# Patient Record
Sex: FEMALE | Race: BLACK OR AFRICAN AMERICAN | Employment: OTHER | URBAN - METROPOLITAN AREA
[De-identification: names, ages, dates, MRNs, and addresses within clinical notes are randomized per-mention and may not be internally consistent; named-entity substitution may affect disease eponyms.]

---

## 2017-01-31 ENCOUNTER — LAB ENCOUNTER (OUTPATIENT)
Dept: LAB | Facility: HOSPITAL | Age: 72
End: 2017-01-31
Attending: INTERNAL MEDICINE
Payer: MEDICARE

## 2017-01-31 DIAGNOSIS — E78.00 HYPERCHOLESTEROLEMIA: ICD-10-CM

## 2017-01-31 DIAGNOSIS — I10 ESSENTIAL HYPERTENSION: ICD-10-CM

## 2017-01-31 DIAGNOSIS — E03.9 ACQUIRED HYPOTHYROIDISM: ICD-10-CM

## 2017-01-31 LAB
ALBUMIN SERPL BCP-MCNC: 4.4 G/DL (ref 3.5–4.8)
ALBUMIN/GLOB SERPL: 1.2 {RATIO} (ref 1–2)
ALP SERPL-CCNC: 44 U/L (ref 32–100)
ALT SERPL-CCNC: 12 U/L (ref 14–54)
ANION GAP SERPL CALC-SCNC: 9 MMOL/L (ref 0–18)
AST SERPL-CCNC: 19 U/L (ref 15–41)
BASOPHILS # BLD: 0 K/UL (ref 0–0.2)
BASOPHILS NFR BLD: 1 %
BILIRUB SERPL-MCNC: 1.2 MG/DL (ref 0.3–1.2)
BUN SERPL-MCNC: 9 MG/DL (ref 8–20)
BUN/CREAT SERPL: 11.3 (ref 10–20)
CALCIUM SERPL-MCNC: 9.6 MG/DL (ref 8.5–10.5)
CHLORIDE SERPL-SCNC: 98 MMOL/L (ref 95–110)
CHOLEST SERPL-MCNC: 252 MG/DL (ref 110–200)
CO2 SERPL-SCNC: 31 MMOL/L (ref 22–32)
CREAT SERPL-MCNC: 0.8 MG/DL (ref 0.5–1.5)
EOSINOPHIL # BLD: 0.1 K/UL (ref 0–0.7)
EOSINOPHIL NFR BLD: 1 %
ERYTHROCYTE [DISTWIDTH] IN BLOOD BY AUTOMATED COUNT: 13.6 % (ref 11–15)
GLOBULIN PLAS-MCNC: 3.7 G/DL (ref 2.5–3.7)
GLUCOSE SERPL-MCNC: 91 MG/DL (ref 70–99)
HCT VFR BLD AUTO: 37.1 % (ref 35–48)
HDLC SERPL-MCNC: 94 MG/DL
HGB BLD-MCNC: 12.3 G/DL (ref 12–16)
LDLC SERPL CALC-MCNC: 143 MG/DL (ref 0–99)
LYMPHOCYTES # BLD: 1.9 K/UL (ref 1–4)
LYMPHOCYTES NFR BLD: 39 %
MCH RBC QN AUTO: 32.5 PG (ref 27–32)
MCHC RBC AUTO-ENTMCNC: 33.1 G/DL (ref 32–37)
MCV RBC AUTO: 98 FL (ref 80–100)
MONOCYTES # BLD: 0.3 K/UL (ref 0–1)
MONOCYTES NFR BLD: 6 %
NEUTROPHILS # BLD AUTO: 2.5 K/UL (ref 1.8–7.7)
NEUTROPHILS NFR BLD: 53 %
NONHDLC SERPL-MCNC: 158 MG/DL
OSMOLALITY UR CALC.SUM OF ELEC: 284 MOSM/KG (ref 275–295)
PLATELET # BLD AUTO: 221 K/UL (ref 140–400)
PMV BLD AUTO: 8.5 FL (ref 7.4–10.3)
POTASSIUM SERPL-SCNC: 4.2 MMOL/L (ref 3.3–5.1)
PROT SERPL-MCNC: 8.1 G/DL (ref 5.9–8.4)
RBC # BLD AUTO: 3.78 M/UL (ref 3.7–5.4)
SODIUM SERPL-SCNC: 138 MMOL/L (ref 136–144)
T4 FREE SERPL-MCNC: 0.83 NG/DL (ref 0.58–1.64)
TRIGL SERPL-MCNC: 73 MG/DL (ref 1–149)
TSH SERPL-ACNC: 19.34 UIU/ML (ref 0.34–5.6)
WBC # BLD AUTO: 4.8 K/UL (ref 4–11)

## 2017-01-31 PROCEDURE — 84439 ASSAY OF FREE THYROXINE: CPT

## 2017-01-31 PROCEDURE — 80053 COMPREHEN METABOLIC PANEL: CPT

## 2017-01-31 PROCEDURE — 80061 LIPID PANEL: CPT

## 2017-01-31 PROCEDURE — 85025 COMPLETE CBC W/AUTO DIFF WBC: CPT

## 2017-01-31 PROCEDURE — 84443 ASSAY THYROID STIM HORMONE: CPT

## 2017-01-31 PROCEDURE — 36415 COLL VENOUS BLD VENIPUNCTURE: CPT

## 2017-02-02 ENCOUNTER — TELEPHONE (OUTPATIENT)
Dept: INTERNAL MEDICINE CLINIC | Facility: CLINIC | Age: 72
End: 2017-02-02

## 2017-02-08 ENCOUNTER — OFFICE VISIT (OUTPATIENT)
Dept: INTERNAL MEDICINE CLINIC | Facility: CLINIC | Age: 72
End: 2017-02-08

## 2017-02-08 VITALS
DIASTOLIC BLOOD PRESSURE: 81 MMHG | HEART RATE: 70 BPM | HEIGHT: 66 IN | BODY MASS INDEX: 20.31 KG/M2 | WEIGHT: 126.38 LBS | RESPIRATION RATE: 16 BRPM | SYSTOLIC BLOOD PRESSURE: 137 MMHG

## 2017-02-08 DIAGNOSIS — E03.9 ACQUIRED HYPOTHYROIDISM: ICD-10-CM

## 2017-02-08 DIAGNOSIS — H83.09 LABYRINTHITIS, UNSPECIFIED LATERALITY: Primary | ICD-10-CM

## 2017-02-08 DIAGNOSIS — E78.00 HYPERCHOLESTEROLEMIA: ICD-10-CM

## 2017-02-08 DIAGNOSIS — K21.9 GASTROESOPHAGEAL REFLUX DISEASE, ESOPHAGITIS PRESENCE NOT SPECIFIED: ICD-10-CM

## 2017-02-08 PROCEDURE — G0463 HOSPITAL OUTPT CLINIC VISIT: HCPCS | Performed by: INTERNAL MEDICINE

## 2017-02-08 PROCEDURE — 99214 OFFICE O/P EST MOD 30 MIN: CPT | Performed by: INTERNAL MEDICINE

## 2017-02-08 RX ORDER — LEVOTHYROXINE SODIUM 112 UG/1
112 TABLET ORAL
Qty: 90 TABLET | Refills: 0 | Status: SHIPPED | OUTPATIENT
Start: 2017-02-08 | End: 2017-05-03

## 2017-02-08 RX ORDER — SIMVASTATIN 40 MG
40 TABLET ORAL NIGHTLY
Qty: 90 TABLET | Refills: 1 | Status: SHIPPED | OUTPATIENT
Start: 2017-02-08 | End: 2017-08-10

## 2017-02-08 RX ORDER — PANTOPRAZOLE SODIUM 40 MG/1
40 TABLET, DELAYED RELEASE ORAL
Qty: 90 TABLET | Refills: 1 | Status: SHIPPED | OUTPATIENT
Start: 2017-02-08 | End: 2019-08-01

## 2017-02-08 RX ORDER — HYDROCHLOROTHIAZIDE 12.5 MG/1
12.5 CAPSULE, GELATIN COATED ORAL DAILY
Qty: 90 CAPSULE | Refills: 1 | Status: SHIPPED | OUTPATIENT
Start: 2017-02-08 | End: 2017-08-10

## 2017-02-08 RX ORDER — MECLIZINE HCL 12.5 MG/1
12.5 TABLET ORAL 3 TIMES DAILY PRN
Qty: 30 TABLET | Refills: 0 | Status: SHIPPED | OUTPATIENT
Start: 2017-02-08 | End: 2019-08-01

## 2017-02-08 NOTE — PROGRESS NOTES
HPI:    Patient ID: Ginny Long is a 66year old female. HPI Comments: She has been having vertigo since Sunday. She woke with it. The room is spinning. She saw the eye doctor and it isn't her eyes.   She told me the wrong dose of her thyroid medica Normocephalic and atraumatic.    Right Ear: Tympanic membrane and ear canal normal. No cerumen present  Left Ear: Tympanic membrane and ear canal normal. No cerumen present  Nose: Nose normal.   Mouth/Throat: Oropharynx is clear and moist. No posterior orop simvastatin 40 MG Oral Tab; Take 1 tablet (40 mg total) by mouth nightly. Dispense: 90 tablet;  Refill: 1         XV#[id number]

## 2017-04-13 ENCOUNTER — OFFICE VISIT (OUTPATIENT)
Dept: INTERNAL MEDICINE CLINIC | Facility: CLINIC | Age: 72
End: 2017-04-13

## 2017-04-13 VITALS
HEIGHT: 66 IN | HEART RATE: 72 BPM | BODY MASS INDEX: 19.77 KG/M2 | WEIGHT: 123 LBS | DIASTOLIC BLOOD PRESSURE: 66 MMHG | SYSTOLIC BLOOD PRESSURE: 112 MMHG | TEMPERATURE: 98 F

## 2017-04-13 DIAGNOSIS — R35.0 URINARY FREQUENCY: Primary | ICD-10-CM

## 2017-04-13 PROCEDURE — 81002 URINALYSIS NONAUTO W/O SCOPE: CPT | Performed by: INTERNAL MEDICINE

## 2017-04-13 PROCEDURE — 99213 OFFICE O/P EST LOW 20 MIN: CPT | Performed by: INTERNAL MEDICINE

## 2017-04-13 PROCEDURE — G0463 HOSPITAL OUTPT CLINIC VISIT: HCPCS | Performed by: INTERNAL MEDICINE

## 2017-04-13 RX ORDER — CEPHALEXIN 500 MG/1
500 CAPSULE ORAL 2 TIMES DAILY
Qty: 14 CAPSULE | Refills: 0 | Status: SHIPPED | OUTPATIENT
Start: 2017-04-13 | End: 2017-04-20

## 2017-04-13 NOTE — PROGRESS NOTES
HPI:    Patient ID: Stevan Rueda is a 66year old female. Urinary  This is a new problem. The current episode started 1 to 4 weeks ago. The problem occurs 2 to 4 times per day. The problem has been unchanged.  Associated symptoms include urinary symptom total) by mouth 2 (two) times daily. Dispense: 14 capsule;  Refill: 0         RA#[id number]

## 2017-04-16 ENCOUNTER — TELEPHONE (OUTPATIENT)
Dept: INTERNAL MEDICINE CLINIC | Facility: CLINIC | Age: 72
End: 2017-04-16

## 2017-04-16 DIAGNOSIS — B96.5 PSEUDOMONAS URINARY TRACT INFECTION: Primary | ICD-10-CM

## 2017-04-16 DIAGNOSIS — N39.0 PSEUDOMONAS URINARY TRACT INFECTION: Primary | ICD-10-CM

## 2017-04-16 NOTE — TELEPHONE ENCOUNTER
Please call pt. Does she still have urinary symptoms? She has a low level amount of bacteria in her urine, but they are resistant to oral antibiotics. She should stop the medication I gave her.    If she doesn't have symptoms, no further treatment is [facility]

## 2017-04-17 NOTE — TELEPHONE ENCOUNTER
Pt says symptoms still present but are improved. Pt unable to come to office for a shot, she is leaving out of town today. Pt says she will just come in and repeat urine culture when she returns to town since the symptoms are improving.  Pt verbalized und

## 2017-04-20 ENCOUNTER — APPOINTMENT (OUTPATIENT)
Dept: LAB | Facility: HOSPITAL | Age: 72
End: 2017-04-20
Attending: INTERNAL MEDICINE
Payer: MEDICARE

## 2017-04-20 DIAGNOSIS — N39.0 PSEUDOMONAS URINARY TRACT INFECTION: ICD-10-CM

## 2017-04-20 DIAGNOSIS — B96.5 PSEUDOMONAS URINARY TRACT INFECTION: ICD-10-CM

## 2017-04-20 PROCEDURE — 87086 URINE CULTURE/COLONY COUNT: CPT

## 2017-04-20 PROCEDURE — 87186 SC STD MICRODIL/AGAR DIL: CPT

## 2017-04-23 ENCOUNTER — TELEPHONE (OUTPATIENT)
Dept: INTERNAL MEDICINE CLINIC | Facility: CLINIC | Age: 72
End: 2017-04-23

## 2017-04-23 RX ORDER — CIPROFLOXACIN 250 MG/1
250 TABLET, FILM COATED ORAL 2 TIMES DAILY
Qty: 14 TABLET | Refills: 0 | Status: SHIPPED | OUTPATIENT
Start: 2017-04-23 | End: 2017-04-23

## 2017-04-23 RX ORDER — CIPROFLOXACIN 500 MG/1
500 TABLET, FILM COATED ORAL 2 TIMES DAILY
Qty: 14 TABLET | Refills: 0 | Status: SHIPPED | OUTPATIENT
Start: 2017-04-23 | End: 2017-04-25

## 2017-04-23 NOTE — TELEPHONE ENCOUNTER
Call pt. Your urine culture showed that you still have a mild infection. There is an oral medicine which I can give you for it now.

## 2017-04-25 RX ORDER — CIPROFLOXACIN 500 MG/1
500 TABLET, FILM COATED ORAL 2 TIMES DAILY
Qty: 14 TABLET | Refills: 0 | Status: SHIPPED | OUTPATIENT
Start: 2017-04-25 | End: 2017-05-02

## 2017-04-25 NOTE — TELEPHONE ENCOUNTER
Patient informed with understanding. Medication resent to the new [facility] on [address] as per patient instructed.       Instructed the patient to push fluids, wipe front to back after using the toilet, wear loose clothing, cotton underwear,  Av

## 2017-05-03 DIAGNOSIS — E03.9 ACQUIRED HYPOTHYROIDISM: Primary | ICD-10-CM

## 2017-05-07 RX ORDER — LEVOTHYROXINE SODIUM 112 UG/1
TABLET ORAL
Qty: 30 TABLET | Refills: 0 | Status: SHIPPED | OUTPATIENT
Start: 2017-05-07 | End: 2017-05-31

## 2017-05-23 ENCOUNTER — TELEPHONE (OUTPATIENT)
Dept: FAMILY MEDICINE CLINIC | Facility: CLINIC | Age: 72
End: 2017-05-23

## 2017-05-23 NOTE — TELEPHONE ENCOUNTER
Our office received a fax from [facility] stating that pt was covered for cataract surgery. Pt's PCP does not work at this office. Faxed paperwork to PCP's office. Fahad barbosa.

## 2017-06-03 NOTE — TELEPHONE ENCOUNTER
Hypothyroid Medications  Protocol Criteria:  Appointment scheduled in the past 12 months or the next 3 months  TSH resulted in the past 12 months that is normal  Recent Visits       Provider Department Primary Dx    1 month ago Brian Jimenez MD [place]

## 2017-06-04 RX ORDER — LEVOTHYROXINE SODIUM 112 UG/1
TABLET ORAL
Qty: 15 TABLET | Refills: 0 | Status: SHIPPED | OUTPATIENT
Start: 2017-06-04 | End: 2017-10-06

## 2017-08-10 ENCOUNTER — LAB ENCOUNTER (OUTPATIENT)
Dept: LAB | Facility: HOSPITAL | Age: 72
End: 2017-08-10
Attending: INTERNAL MEDICINE
Payer: MEDICARE

## 2017-08-10 ENCOUNTER — OFFICE VISIT (OUTPATIENT)
Dept: INTERNAL MEDICINE CLINIC | Facility: CLINIC | Age: 72
End: 2017-08-10

## 2017-08-10 VITALS
BODY MASS INDEX: 19.98 KG/M2 | RESPIRATION RATE: 16 BRPM | WEIGHT: 124.31 LBS | HEIGHT: 66 IN | HEART RATE: 65 BPM | DIASTOLIC BLOOD PRESSURE: 69 MMHG | SYSTOLIC BLOOD PRESSURE: 128 MMHG

## 2017-08-10 DIAGNOSIS — Z00.00 MEDICARE ANNUAL WELLNESS VISIT, INITIAL: Primary | ICD-10-CM

## 2017-08-10 DIAGNOSIS — Z13.31 DEPRESSION SCREENING: ICD-10-CM

## 2017-08-10 DIAGNOSIS — E03.9 ACQUIRED HYPOTHYROIDISM: ICD-10-CM

## 2017-08-10 DIAGNOSIS — Z01.419 ENCOUNTER FOR GYNECOLOGICAL EXAMINATION WITHOUT ABNORMAL FINDING: ICD-10-CM

## 2017-08-10 DIAGNOSIS — Z12.31 VISIT FOR SCREENING MAMMOGRAM: ICD-10-CM

## 2017-08-10 DIAGNOSIS — Z23 NEED FOR VACCINATION: ICD-10-CM

## 2017-08-10 DIAGNOSIS — I10 ESSENTIAL HYPERTENSION: ICD-10-CM

## 2017-08-10 DIAGNOSIS — E78.00 HYPERCHOLESTEROLEMIA: ICD-10-CM

## 2017-08-10 DIAGNOSIS — M47.812 CERVICAL ARTHRITIS: ICD-10-CM

## 2017-08-10 LAB
ANION GAP SERPL CALC-SCNC: 8 MMOL/L (ref 0–18)
BUN SERPL-MCNC: 9 MG/DL (ref 8–20)
BUN/CREAT SERPL: 9.4 (ref 10–20)
CALCIUM SERPL-MCNC: 9.6 MG/DL (ref 8.5–10.5)
CHLORIDE SERPL-SCNC: 98 MMOL/L (ref 95–110)
CO2 SERPL-SCNC: 32 MMOL/L (ref 22–32)
CREAT SERPL-MCNC: 0.96 MG/DL (ref 0.5–1.5)
GLUCOSE SERPL-MCNC: 84 MG/DL (ref 70–99)
OSMOLALITY UR CALC.SUM OF ELEC: 284 MOSM/KG (ref 275–295)
POTASSIUM SERPL-SCNC: 3.6 MMOL/L (ref 3.3–5.1)
SODIUM SERPL-SCNC: 138 MMOL/L (ref 136–144)
T4 FREE SERPL-MCNC: 0.56 NG/DL (ref 0.58–1.64)
TSH SERPL-ACNC: 52.92 UIU/ML (ref 0.45–5.33)

## 2017-08-10 PROCEDURE — G0101 CA SCREEN;PELVIC/BREAST EXAM: HCPCS | Performed by: INTERNAL MEDICINE

## 2017-08-10 PROCEDURE — 84439 ASSAY OF FREE THYROXINE: CPT

## 2017-08-10 PROCEDURE — G0009 ADMIN PNEUMOCOCCAL VACCINE: HCPCS | Performed by: INTERNAL MEDICINE

## 2017-08-10 PROCEDURE — 84443 ASSAY THYROID STIM HORMONE: CPT

## 2017-08-10 PROCEDURE — 90670 PCV13 VACCINE IM: CPT | Performed by: INTERNAL MEDICINE

## 2017-08-10 PROCEDURE — 36415 COLL VENOUS BLD VENIPUNCTURE: CPT

## 2017-08-10 PROCEDURE — 96160 PT-FOCUSED HLTH RISK ASSMT: CPT | Performed by: INTERNAL MEDICINE

## 2017-08-10 PROCEDURE — 80048 BASIC METABOLIC PNL TOTAL CA: CPT

## 2017-08-10 RX ORDER — SIMVASTATIN 40 MG
40 TABLET ORAL NIGHTLY
Qty: 90 TABLET | Refills: 1 | Status: SHIPPED | OUTPATIENT
Start: 2017-08-10 | End: 2017-12-20

## 2017-08-10 RX ORDER — HYDROCHLOROTHIAZIDE 12.5 MG/1
12.5 CAPSULE, GELATIN COATED ORAL DAILY
Qty: 90 CAPSULE | Refills: 1 | Status: SHIPPED | OUTPATIENT
Start: 2017-08-10 | End: 2017-12-20

## 2017-08-10 RX ORDER — ASPIRIN 81 MG/1
81 TABLET ORAL DAILY
Qty: 30 TABLET | Refills: 0 | Status: SHIPPED | OUTPATIENT
Start: 2017-08-10

## 2017-08-10 NOTE — PATIENT INSTRUCTIONS
Recommended Websites for Advanced Directives    [employer]no. org/publications/[email or web address]/personal_[email or web address]. pdf  An information packet, including necessary form from the [address] website. http://www. [place]state. [place]/public/books/adv

## 2017-08-10 NOTE — PROGRESS NOTES
HPI:    Patient ID: Don Rene is a 66year old female. HPI:  Don Rene is a 66year old female who presents for a Medicare initial exam.    She is doing well. She has a 15year old foster son. She is active.     Annual Physical due on 08/10/201 INFORMATION:  She  has a past medical history of Essential hypertension; Hyperlipidemia; and Hyperthyroidism.    She  has a past surgical history that includes back surgery; hysterectomy; other surgical history; other surgical history; and cataract (Left, 3 inappropriate responses:  No I avoid social activities because I cannot hear well and fear I will reply improperly:  No Family members and friends have told me they think I may have hearing loss:  No       Visual Acuity  Right Eye Visual Acuity: Asuncion Shrestha No    Problems with daily activities? : No    Memory Problems?: No     Fall/Risk Assessment   Do you have 3 or more medical conditions?: 0-No    Have you fallen in the last 12 months?: 0-No    Do you accidently lose urine?: 1-Yes    Do you have difficulty Preventative Physical Exam only, or if medically necessary Electrocardiogram date   Colorectal Cancer Screening   Colonoscopy Screen every 10 years Colonoscopy,10 Years due on 06/21/2026 Update Health Maintenance if applicable  Flex Sigmoidoscopy Screen ev Value                01/31/2017               0.80             ---------- No flowsheet data found.   BUN  Annually BUN (mg/dL)      Date                     Value                01/31/2017               9                ---------- No flows 12.5 MG Oral Cap Take 1 capsule (12.5 mg total) by mouth daily.  Disp: 90 capsule Rfl: 1   Pantoprazole Sodium 40 MG Oral Tab EC Take 1 tablet (40 mg total) by mouth every morning before breakfast. Disp: 90 tablet Rfl: 1   Meclizine HCl 12.5 MG Oral Tab Tc Samaniego exam.  Pt is up-to-date on colonoscopy   Advised to get flu vaccine every year  Recommended pt take aspirin 81 mg daily. .  Diet assessment:  Good. Advanced Directive:  POA:  Pt does not have POA in [address] [place] [address] Rd. Discussed with pt and provided information.

## 2017-08-12 ENCOUNTER — TELEPHONE (OUTPATIENT)
Dept: INTERNAL MEDICINE CLINIC | Facility: CLINIC | Age: 72
End: 2017-08-12

## 2017-08-12 RX ORDER — LEVOTHYROXINE SODIUM 112 UG/1
TABLET ORAL
Qty: 90 TABLET | Refills: 1 | Status: CANCELLED | OUTPATIENT
Start: 2017-08-12

## 2017-08-12 NOTE — TELEPHONE ENCOUNTER
Call pt. Her thyroid level is very low. Did she run out of her thyroid medication? If so, when? I will send a new prescription to the pharmacy. Which local pharmacy does she want to use? I need to see her back in 3 months.   Please schedule an appoint

## 2017-08-14 NOTE — TELEPHONE ENCOUNTER
Patient returning call - informed patient of Dr Stiven Briceno note. Pt states she has not run out of meds, it's just that \"sometimes I don't take them\" Pt states she has been taking them for a week, but before that had been off of them for a week.  Pt states

## 2017-08-14 NOTE — TELEPHONE ENCOUNTER
Please gently tell pt that if she doesn't take the thyroid medication it could slow her thinking, slow her reflexes, and affect her ability to drive. It is a serious problem.   It is an important medication that affects all systems of the body including he

## 2017-08-17 NOTE — TELEPHONE ENCOUNTER
[facility] transfer to [phone]. Dr. Mainor Miller the patient is not calling us back. Would you like a certified letter sent with the information below?

## 2017-08-17 NOTE — TELEPHONE ENCOUNTER
Pt called back and was informed of Dr Beti Haas message below. Pt states has been taking the levothyroxine for ~2 weeks now and plans to continue taking it daily as prescribed.     RAMEZ Larkin

## 2017-08-30 ENCOUNTER — NURSE TRIAGE (OUTPATIENT)
Dept: OTHER | Age: 72
End: 2017-08-30

## 2017-08-30 NOTE — TELEPHONE ENCOUNTER
Action Requested: Summary for Provider     []  Critical Lab, Recommendations Needed  [] Need Additional Advice  []   FYI    []   Need Orders  [] Need Medications Sent to Pharmacy  []  Other     SUMMARY: Spoke to pt, had informed  at [facility] of ear pain.  Per

## 2017-08-31 ENCOUNTER — OFFICE VISIT (OUTPATIENT)
Dept: INTERNAL MEDICINE CLINIC | Facility: CLINIC | Age: 72
End: 2017-08-31

## 2017-08-31 VITALS
HEIGHT: 66 IN | WEIGHT: 123 LBS | TEMPERATURE: 98 F | DIASTOLIC BLOOD PRESSURE: 67 MMHG | BODY MASS INDEX: 19.77 KG/M2 | SYSTOLIC BLOOD PRESSURE: 115 MMHG | HEART RATE: 75 BPM

## 2017-08-31 DIAGNOSIS — I10 ESSENTIAL HYPERTENSION: ICD-10-CM

## 2017-08-31 DIAGNOSIS — E03.9 ACQUIRED HYPOTHYROIDISM: ICD-10-CM

## 2017-08-31 DIAGNOSIS — S16.1XXA STRAIN OF NECK MUSCLE, INITIAL ENCOUNTER: Primary | ICD-10-CM

## 2017-08-31 PROCEDURE — G0463 HOSPITAL OUTPT CLINIC VISIT: HCPCS | Performed by: INTERNAL MEDICINE

## 2017-08-31 PROCEDURE — 99214 OFFICE O/P EST MOD 30 MIN: CPT | Performed by: INTERNAL MEDICINE

## 2017-08-31 NOTE — ASSESSMENT & PLAN NOTE
Pt did not realize the importance of taking her thyroid medication. I emphasized that it was critical for her health. She has resumed taking it daily. Recheck labs in 2 months and f/u afterwards.

## 2017-08-31 NOTE — PROGRESS NOTES
HPI:    Patient ID: Alexis Louie is a 66year old female. She has been having ear pain. It it worse at night. She had stopped taking her thyroid medication and her TSH was very high. Ear Pain    There is pain in the left ear.  This is a new p 0.00      Smokeless tobacco: Former User                     Alcohol use:  Yes              Comment: ocassionally    Family History   Problem Relation Age of Onset   • Cancer Father    • Heart Disorder Mother      Stroke   • Cancer Brother    • Diabetes Neg encounter

## 2017-08-31 NOTE — ASSESSMENT & PLAN NOTE
Pt's ear pain is reproduced by rotation of the neck. Pain is likely due to neck strain. Advised tylenol before bed and neck exercises. Gave written exercises.

## 2017-09-11 ENCOUNTER — NURSE TRIAGE (OUTPATIENT)
Dept: OTHER | Age: 72
End: 2017-09-11

## 2017-09-11 DIAGNOSIS — H92.02 LEFT EAR PAIN: Primary | ICD-10-CM

## 2017-09-11 NOTE — TELEPHONE ENCOUNTER
Pt had an OV on 08/31 for this same ear pain. She states that she has been taking Tylenol #3 (left over from dental procedure) but this has not helped with her pain. Her left ear and into neck is throbbing. Pain has not diminished since LOV.   Pt request

## 2017-09-15 ENCOUNTER — APPOINTMENT (OUTPATIENT)
Dept: LAB | Facility: HOSPITAL | Age: 72
End: 2017-09-15
Attending: INTERNAL MEDICINE
Payer: MEDICARE

## 2017-09-15 ENCOUNTER — NURSE TRIAGE (OUTPATIENT)
Dept: OTHER | Age: 72
End: 2017-09-15

## 2017-09-15 ENCOUNTER — OFFICE VISIT (OUTPATIENT)
Dept: INTERNAL MEDICINE CLINIC | Facility: CLINIC | Age: 72
End: 2017-09-15

## 2017-09-15 ENCOUNTER — OFFICE VISIT (OUTPATIENT)
Dept: OTOLARYNGOLOGY | Facility: CLINIC | Age: 72
End: 2017-09-15

## 2017-09-15 VITALS
BODY MASS INDEX: 21 KG/M2 | HEIGHT: 64 IN | DIASTOLIC BLOOD PRESSURE: 66 MMHG | WEIGHT: 123 LBS | SYSTOLIC BLOOD PRESSURE: 104 MMHG

## 2017-09-15 VITALS
WEIGHT: 124 LBS | HEART RATE: 73 BPM | SYSTOLIC BLOOD PRESSURE: 135 MMHG | HEIGHT: 66 IN | BODY MASS INDEX: 19.93 KG/M2 | DIASTOLIC BLOOD PRESSURE: 78 MMHG

## 2017-09-15 DIAGNOSIS — H92.02 LEFT EAR PAIN: Primary | ICD-10-CM

## 2017-09-15 DIAGNOSIS — R30.0 DYSURIA: Primary | ICD-10-CM

## 2017-09-15 LAB
BILIRUB UR QL: NEGATIVE
COLOR UR: YELLOW
GLUCOSE UR-MCNC: NEGATIVE MG/DL
KETONES UR-MCNC: NEGATIVE MG/DL
NITRITE UR QL STRIP.AUTO: NEGATIVE
PH UR: 7 [PH] (ref 5–8)
PROT UR-MCNC: 100 MG/DL
RBC #/AREA URNS AUTO: 238 /HPF
SP GR UR STRIP: 1.01 (ref 1–1.03)
UROBILINOGEN UR STRIP-ACNC: <2
VIT C UR-MCNC: NEGATIVE MG/DL
WBC #/AREA URNS AUTO: 519 /HPF

## 2017-09-15 PROCEDURE — G0463 HOSPITAL OUTPT CLINIC VISIT: HCPCS | Performed by: INTERNAL MEDICINE

## 2017-09-15 PROCEDURE — 99203 OFFICE O/P NEW LOW 30 MIN: CPT | Performed by: OTOLARYNGOLOGY

## 2017-09-15 PROCEDURE — 87077 CULTURE AEROBIC IDENTIFY: CPT | Performed by: INTERNAL MEDICINE

## 2017-09-15 PROCEDURE — 87086 URINE CULTURE/COLONY COUNT: CPT | Performed by: INTERNAL MEDICINE

## 2017-09-15 PROCEDURE — 87186 SC STD MICRODIL/AGAR DIL: CPT | Performed by: INTERNAL MEDICINE

## 2017-09-15 PROCEDURE — G0463 HOSPITAL OUTPT CLINIC VISIT: HCPCS | Performed by: OTOLARYNGOLOGY

## 2017-09-15 PROCEDURE — 99213 OFFICE O/P EST LOW 20 MIN: CPT | Performed by: INTERNAL MEDICINE

## 2017-09-15 PROCEDURE — 81001 URINALYSIS AUTO W/SCOPE: CPT | Performed by: INTERNAL MEDICINE

## 2017-09-15 RX ORDER — ASCORBIC ACID 500 MG
500 TABLET ORAL DAILY
COMMUNITY

## 2017-09-15 RX ORDER — NITROFURANTOIN 25; 75 MG/1; MG/1
100 CAPSULE ORAL 2 TIMES DAILY WITH MEALS
Qty: 10 CAPSULE | Refills: 0 | Status: SHIPPED | OUTPATIENT
Start: 2017-09-15 | End: 2017-09-20

## 2017-09-15 RX ORDER — OMEGA-3S/DHA/EPA/FISH OIL/D3 300MG-1000
CAPSULE ORAL
COMMUNITY

## 2017-09-15 NOTE — PATIENT INSTRUCTIONS
Await results of urine testing. Please take Macrobid 100 mg twice daily with meals for 5 days. Call if no better.

## 2017-09-15 NOTE — PROGRESS NOTES
Annmarie Sierra is a 66year old female. Patient presents with:  Burning On Urination: Pt stts she also saw blood in the urine as well    HPI:   She has had 2 episodes this morning of burning with urination and blood in her urine.   She had no symptoms yester date: OTHER SURGICAL HISTORY      Comment: Thyroid nodule removed   Social History:  Smoking status: Former Smoker                                                              Packs/day: 0.00      Years: 0.00      Smokeless tobacco: Former User

## 2017-09-15 NOTE — TELEPHONE ENCOUNTER
Action Requested: Summary for Provider     []  Critical Lab, Recommendations Needed  [] Need Additional Advice  []   FYI    []   Need Orders  [] Need Medications Sent to Pharmacy  []  Other     SUMMARY: Advised appt per protocol, and pt agrees.  Scheduled t

## 2017-09-16 NOTE — PROGRESS NOTES
Marnie Crystal is a 66year old female. Patient presents with:  Ear Pain: left ear x one month, getting worse     HPI:   She has been experieincing pain in and around the left ear for the last few weeks. No drainage or change in hearing.  Has had some pain i rashes  RESPIRATORY: denies shortness of breath with exertion  NEURO: denies headaches    EXAM:   /66 (BP Location: Left arm, Patient Position: Sitting, Cuff Size: adult)   Ht 5' 4\" (1.626 m)   Wt 123 lb (55.8 kg)   BMI 21.11 kg/m²   System Findings

## 2017-10-06 ENCOUNTER — TELEPHONE (OUTPATIENT)
Dept: INTERNAL MEDICINE CLINIC | Facility: CLINIC | Age: 72
End: 2017-10-06

## 2017-10-06 ENCOUNTER — LAB ENCOUNTER (OUTPATIENT)
Dept: LAB | Facility: HOSPITAL | Age: 72
End: 2017-10-06
Attending: INTERNAL MEDICINE
Payer: MEDICARE

## 2017-10-06 DIAGNOSIS — E03.9 ACQUIRED HYPOTHYROIDISM: Primary | ICD-10-CM

## 2017-10-06 DIAGNOSIS — E03.9 ACQUIRED HYPOTHYROIDISM: ICD-10-CM

## 2017-10-06 PROCEDURE — 84443 ASSAY THYROID STIM HORMONE: CPT

## 2017-10-06 PROCEDURE — 84439 ASSAY OF FREE THYROXINE: CPT

## 2017-10-06 RX ORDER — LEVOTHYROXINE SODIUM 137 UG/1
137 TABLET ORAL
Qty: 90 TABLET | Refills: 1 | Status: SHIPPED | OUTPATIENT
Start: 2017-10-06 | End: 2017-12-25

## 2017-10-07 NOTE — TELEPHONE ENCOUNTER
Call pt: Your tests indicate that your thyroid dose is still too low. This can make you feel tired and affect all the organs in your body. I need to increase it slightly. I will send a new script to the pharmacy.   Then I would like to recheck a non-fas

## 2017-10-11 NOTE — TELEPHONE ENCOUNTER
LMTCB transfer to triage= see message below    Call pt: Your tests indicate that your thyroid dose is still too low. This can make you feel tired and affect all the organs in your body. I need to increase it slightly.   I will send a new script to the

## 2017-10-11 NOTE — TELEPHONE ENCOUNTER
Spoke with patient (verified name and ), reviewed information, patient verbalized understanding and agrees with plan. Instructions for taking levothyroxine discussed with pt, she denies questions.

## 2017-10-24 ENCOUNTER — TELEPHONE (OUTPATIENT)
Dept: INTERNAL MEDICINE CLINIC | Facility: CLINIC | Age: 72
End: 2017-10-24

## 2017-10-24 DIAGNOSIS — H57.10 EYE PAIN, UNSPECIFIED LATERALITY: Primary | ICD-10-CM

## 2017-10-24 NOTE — TELEPHONE ENCOUNTER
Teetee Woody from [facility] called requesting a referral for this patient to be seen today in their office for eye pain ,

## 2017-10-25 NOTE — TELEPHONE ENCOUNTER
Sorry.  There was a question about which Dr. Justin Barron. And then I forgot to sign. I signed it now. Please fax.

## 2017-10-25 NOTE — TELEPHONE ENCOUNTER
No referral has been recorded in EPIC. Referral order prepped for your signature. Please sign and I will fax to the office. Thank you.

## 2017-11-01 ENCOUNTER — MED REC SCAN ONLY (OUTPATIENT)
Dept: INTERNAL MEDICINE CLINIC | Facility: CLINIC | Age: 72
End: 2017-11-01

## 2017-11-02 ENCOUNTER — OFFICE VISIT (OUTPATIENT)
Dept: INTERNAL MEDICINE CLINIC | Facility: CLINIC | Age: 72
End: 2017-11-02

## 2017-11-02 VITALS
BODY MASS INDEX: 20.3 KG/M2 | DIASTOLIC BLOOD PRESSURE: 67 MMHG | WEIGHT: 126.31 LBS | SYSTOLIC BLOOD PRESSURE: 117 MMHG | RESPIRATION RATE: 18 BRPM | HEIGHT: 66 IN | HEART RATE: 71 BPM

## 2017-11-02 DIAGNOSIS — E03.9 ACQUIRED HYPOTHYROIDISM: ICD-10-CM

## 2017-11-02 DIAGNOSIS — I10 ESSENTIAL HYPERTENSION: ICD-10-CM

## 2017-11-02 DIAGNOSIS — M54.2 NECK PAIN: Primary | ICD-10-CM

## 2017-11-02 PROCEDURE — 99214 OFFICE O/P EST MOD 30 MIN: CPT | Performed by: INTERNAL MEDICINE

## 2017-11-02 PROCEDURE — G0463 HOSPITAL OUTPT CLINIC VISIT: HCPCS | Performed by: INTERNAL MEDICINE

## 2017-11-02 NOTE — ASSESSMENT & PLAN NOTE
Neck muscle strain. Will refer for PT. She would like to go for PT closer to her home. Gave information for [facility].

## 2017-11-02 NOTE — PROGRESS NOTES
HPI:    Patient ID: Prince Baumann is a 66year old female. Pt is still having the left ear pain, especially at night. She saw Dr. Leocadia Mcburney who said it is TMJ and musculoskeletal pain.   He told her to take Aleve and tylenol      Ear Pain    There is pain i Pantoprazole Sodium 40 MG Oral Tab EC Take 1 tablet (40 mg total) by mouth every morning before breakfast. Disp: 90 tablet Rfl: 1   Meclizine HCl 12.5 MG Oral Tab Take 1 tablet (12.5 mg total) by mouth 3 (three) times daily as needed for Dizziness.  CAUSE Relevant Orders    PHYSICAL THERAPY - INTERNAL    Essential hypertension     Controlled. Continue present management. Acquired hypothyroidism     Urged pt to take the levothyroxine every day. Recheck labs in December.            Other Visit Kristopher Walker

## 2017-11-04 ENCOUNTER — HOSPITAL ENCOUNTER (OUTPATIENT)
Dept: MAMMOGRAPHY | Facility: HOSPITAL | Age: 72
Discharge: HOME | End: 2017-11-04
Attending: INTERNAL MEDICINE
Payer: MEDICARE

## 2017-11-04 DIAGNOSIS — Z12.31 VISIT FOR SCREENING MAMMOGRAM: ICD-10-CM

## 2017-11-04 PROCEDURE — 77067 SCR MAMMO BI INCL CAD: CPT | Performed by: INTERNAL MEDICINE

## 2017-12-19 ENCOUNTER — OFFICE VISIT (OUTPATIENT)
Dept: INTERNAL MEDICINE CLINIC | Facility: CLINIC | Age: 72
End: 2017-12-19

## 2017-12-19 ENCOUNTER — HOSPITAL ENCOUNTER (OUTPATIENT)
Dept: GENERAL RADIOLOGY | Facility: HOSPITAL | Age: 72
Discharge: HOME | End: 2017-12-19
Attending: PHYSICIAN ASSISTANT
Payer: MEDICARE

## 2017-12-19 ENCOUNTER — LAB ENCOUNTER (OUTPATIENT)
Dept: LAB | Facility: HOSPITAL | Age: 72
End: 2017-12-19
Attending: INTERNAL MEDICINE
Payer: MEDICARE

## 2017-12-19 VITALS
SYSTOLIC BLOOD PRESSURE: 106 MMHG | BODY MASS INDEX: 20 KG/M2 | DIASTOLIC BLOOD PRESSURE: 68 MMHG | WEIGHT: 125.5 LBS | TEMPERATURE: 98 F | HEART RATE: 76 BPM

## 2017-12-19 DIAGNOSIS — E03.9 ACQUIRED HYPOTHYROIDISM: ICD-10-CM

## 2017-12-19 DIAGNOSIS — M25.511 ACUTE PAIN OF RIGHT SHOULDER: ICD-10-CM

## 2017-12-19 DIAGNOSIS — M25.511 ACUTE PAIN OF RIGHT SHOULDER: Primary | ICD-10-CM

## 2017-12-19 PROCEDURE — 36415 COLL VENOUS BLD VENIPUNCTURE: CPT

## 2017-12-19 PROCEDURE — 84439 ASSAY OF FREE THYROXINE: CPT

## 2017-12-19 PROCEDURE — 84443 ASSAY THYROID STIM HORMONE: CPT

## 2017-12-19 PROCEDURE — 99213 OFFICE O/P EST LOW 20 MIN: CPT | Performed by: PHYSICIAN ASSISTANT

## 2017-12-19 PROCEDURE — 73030 X-RAY EXAM OF SHOULDER: CPT | Performed by: PHYSICIAN ASSISTANT

## 2017-12-19 NOTE — PATIENT INSTRUCTIONS
Ibuprofen/Advil - take 1-2 tablets every 8 hours with food for the next 3 days, then you can take it only as needed  Continue to apply heat to the right shoulder for 10-20 minutes 2-3 times a day  Will check shoulder xray and contact you with the results

## 2017-12-19 NOTE — PROGRESS NOTES
Bryan Goodman is a 67year old female. Patient presents with:  Shoulder Pain: left shoulder  Tingling: left side of face      HPI:   Patient presents today complaining of right shoulder pain and tingling in the left cheek.  Shoulder pain began one week ago by mouth daily.  Disp: 90 capsule Rfl: 1   Pantoprazole Sodium 40 MG Oral Tab EC Take 1 tablet (40 mg total) by mouth every morning before breakfast. Disp: 90 tablet Rfl: 1   Meclizine HCl 12.5 MG Oral Tab Take 1 tablet (12.5 mg total) by mouth 3 (three) ti or gallops. No edema. EXTREMITIES: TTP of right anterior shoulder. Decreased active and passive range of motion due to pain. Pain with overhead reaching, internal and external rotation. BACK: Normal curvature. No midline or paraspinal tenderness.   Janie Carrizales

## 2017-12-20 DIAGNOSIS — E78.00 HYPERCHOLESTEROLEMIA: ICD-10-CM

## 2017-12-20 RX ORDER — HYDROCHLOROTHIAZIDE 12.5 MG/1
CAPSULE, GELATIN COATED ORAL
Qty: 90 CAPSULE | Refills: 0 | Status: SHIPPED | OUTPATIENT
Start: 2017-12-20 | End: 2018-02-22

## 2017-12-20 RX ORDER — SIMVASTATIN 40 MG
TABLET ORAL
Qty: 90 TABLET | Refills: 0 | Status: SHIPPED | OUTPATIENT
Start: 2017-12-20 | End: 2018-02-22

## 2017-12-20 NOTE — TELEPHONE ENCOUNTER
Signed Prescriptions Disp Refills    SIMVASTATIN 40 MG Oral Tab 90 tablet 0      Sig: TAKE 1 TABLET EVERY NIGHT        Authorizing Provider: Diego Staton        Ordering User: Moisés Morales      HYDROCHLOROTHIAZIDE 12.5 MG Oral Cap 90 capsule 0 CL 98 08/10/2017   CO2 32 08/10/2017   GLOBULIN 3.7 01/31/2017   ANIONGAP 8 08/10/2017   OSMOCALC 284 08/10/2017     Cholesterol Medications  Protocol Criteria:  · Appointment scheduled in the past 12 months or in the next 3 months  · ALT & LDL on file in

## 2017-12-25 ENCOUNTER — TELEPHONE (OUTPATIENT)
Dept: INTERNAL MEDICINE CLINIC | Facility: CLINIC | Age: 72
End: 2017-12-25

## 2017-12-25 DIAGNOSIS — E03.9 ACQUIRED HYPOTHYROIDISM: Primary | ICD-10-CM

## 2017-12-25 RX ORDER — LEVOTHYROXINE SODIUM 0.15 MG/1
150 TABLET ORAL
Qty: 90 TABLET | Refills: 0 | Status: SHIPPED | OUTPATIENT
Start: 2017-12-25 | End: 2018-02-06

## 2017-12-26 NOTE — TELEPHONE ENCOUNTER
Please call pt. Your thyroid dose is still a little bit too low. This can make you feel tired and weak. I need to increase it slightly. I will send a new script to the pharmacy. Then I would like to recheck a non-fasting blood test in 3 months.     It is

## 2017-12-27 NOTE — TELEPHONE ENCOUNTER
Advised patient on Dr. Jeniffer James information and recommendations on her thyroid prescription. Patient verbalized understanding. Appointment scheduled with Dr. Mainor Miller for 3/20/18 9:10 am at the [facility].  Advised patient to call back if questions or issues; she

## 2018-01-25 ENCOUNTER — TELEPHONE (OUTPATIENT)
Dept: OTHER | Age: 73
End: 2018-01-25

## 2018-01-25 NOTE — TELEPHONE ENCOUNTER
Pt states she was advised to see ENT last year for left ear pain, she saw the ENT but he did nothing for her symptoms. States she is getting more pain now and is now going down to her neck on the left side. No fever.  Pt asking to schedule appt sooner than

## 2018-01-30 ENCOUNTER — LAB ENCOUNTER (OUTPATIENT)
Dept: LAB | Facility: HOSPITAL | Age: 73
End: 2018-01-30
Attending: INTERNAL MEDICINE
Payer: MEDICARE

## 2018-01-30 DIAGNOSIS — E03.9 ACQUIRED HYPOTHYROIDISM: ICD-10-CM

## 2018-01-30 LAB
T3 SERPL-MCNC: 1.21 NG/ML (ref 0.87–1.78)
T4 FREE SERPL-MCNC: 1.43 NG/DL (ref 0.58–1.64)
TSH SERPL-ACNC: 0.23 UIU/ML (ref 0.45–5.33)

## 2018-01-30 PROCEDURE — 84443 ASSAY THYROID STIM HORMONE: CPT

## 2018-01-30 PROCEDURE — 36415 COLL VENOUS BLD VENIPUNCTURE: CPT

## 2018-01-30 PROCEDURE — 84480 ASSAY TRIIODOTHYRONINE (T3): CPT

## 2018-01-30 PROCEDURE — 84439 ASSAY OF FREE THYROXINE: CPT

## 2018-02-06 ENCOUNTER — OFFICE VISIT (OUTPATIENT)
Dept: INTERNAL MEDICINE CLINIC | Facility: CLINIC | Age: 73
End: 2018-02-06

## 2018-02-06 VITALS
HEART RATE: 71 BPM | TEMPERATURE: 99 F | BODY MASS INDEX: 20.58 KG/M2 | SYSTOLIC BLOOD PRESSURE: 106 MMHG | HEIGHT: 65.5 IN | WEIGHT: 125 LBS | DIASTOLIC BLOOD PRESSURE: 68 MMHG

## 2018-02-06 DIAGNOSIS — E03.9 ACQUIRED HYPOTHYROIDISM: ICD-10-CM

## 2018-02-06 DIAGNOSIS — M26.609 TMJ (TEMPOROMANDIBULAR JOINT DISORDER): Primary | ICD-10-CM

## 2018-02-06 DIAGNOSIS — M54.2 NECK PAIN: ICD-10-CM

## 2018-02-06 DIAGNOSIS — I10 ESSENTIAL HYPERTENSION: ICD-10-CM

## 2018-02-06 PROBLEM — S16.1XXA CERVICAL STRAIN: Status: RESOLVED | Noted: 2017-08-31 | Resolved: 2018-02-06

## 2018-02-06 PROCEDURE — G0463 HOSPITAL OUTPT CLINIC VISIT: HCPCS | Performed by: INTERNAL MEDICINE

## 2018-02-06 PROCEDURE — 99214 OFFICE O/P EST MOD 30 MIN: CPT | Performed by: INTERNAL MEDICINE

## 2018-02-06 RX ORDER — CYCLOBENZAPRINE HCL 10 MG
10 TABLET ORAL NIGHTLY PRN
Qty: 30 TABLET | Refills: 1 | Status: SHIPPED | OUTPATIENT
Start: 2018-02-06 | End: 2018-03-08

## 2018-02-06 RX ORDER — LEVOTHYROXINE SODIUM 137 UG/1
137 TABLET ORAL
Qty: 90 TABLET | Refills: 1 | Status: SHIPPED | OUTPATIENT
Start: 2018-02-06 | End: 2018-08-07

## 2018-02-06 NOTE — PATIENT INSTRUCTIONS
Helping Your Temporomandibular Joint (TMJ) Heal  The temporomandibular joint (TMJ) is a ball-and-socket joint located where the upper and lower jaws meet. When the TMJ and related muscles are injured, they need time to heal. Self-care is very important. · Support your jaw when yawning. When you feel a yawn coming on, put a fist under your jaw. Apply gentle pressure. This helps prevent wide, painful yawns. · Don’t do any activity that hurts. This includes nail biting, yelling, and singing.   Maintaining go

## 2018-02-06 NOTE — ASSESSMENT & PLAN NOTE
She is not improving. Continue Aleve prn. Add Flexeril. F/u with Dr. Tunde Vasquez.   Harmony written information given to pt

## 2018-02-06 NOTE — PROGRESS NOTES
HPI:    Patient ID: Nigel Schilling is a 67year old female. She is still having the left ear and jaw pain. It is hard to sleep at night. Her thyroid level came up and now is too high. Her neck pain is better.       Ear Pain    There is pain in the left Rfl: 1   Meclizine HCl 12.5 MG Oral Tab Take 1 tablet (12.5 mg total) by mouth 3 (three) times daily as needed for Dizziness.  CAUSES DROWSINESS Disp: 30 tablet Rfl: 0       Allergies:  Robaxin [Methocarba*    Itching     Smoking status: Former Smoker Controlled. Continue present management. Neck pain     Much improved.                    Meds This Visit:  Signed Prescriptions Disp Refills    Levothyroxine Sodium 137 MCG Oral Tab 90 tablet 1      Sig: Take 137 mcg by mouth before breakfast.

## 2018-02-06 NOTE — ASSESSMENT & PLAN NOTE
She is taking the thyroid medication daily and now her TSH is suppressed. Will return to her previous dose.

## 2018-02-08 ENCOUNTER — TELEPHONE (OUTPATIENT)
Dept: INTERNAL MEDICINE CLINIC | Facility: CLINIC | Age: 73
End: 2018-02-08

## 2018-02-08 NOTE — TELEPHONE ENCOUNTER
Patient is eligible for a 2018 Annual Medicare Health Assessment. Discussed in detail w/patient. Appt scheduled for 6/21/18.

## 2018-02-13 NOTE — TELEPHONE ENCOUNTER
PA for Cyclobenzaprine HCL 10 mg tab completed with [employer] via CMM response time 24-72 hours [facility].

## 2018-02-22 DIAGNOSIS — E78.00 HYPERCHOLESTEROLEMIA: ICD-10-CM

## 2018-02-24 RX ORDER — SIMVASTATIN 40 MG
TABLET ORAL
Qty: 90 TABLET | Refills: 0 | Status: SHIPPED | OUTPATIENT
Start: 2018-02-24 | End: 2018-07-17

## 2018-02-24 RX ORDER — HYDROCHLOROTHIAZIDE 12.5 MG/1
CAPSULE, GELATIN COATED ORAL
Qty: 90 CAPSULE | Refills: 0 | Status: SHIPPED | OUTPATIENT
Start: 2018-02-24 | End: 2019-08-01

## 2018-02-24 NOTE — TELEPHONE ENCOUNTER
Cholesterol Medications: Refill protocol failed because the patient did not meet the protocol criteria.  Please advise in regards to refill request     Protocol Criteria:  · Appointment scheduled in the past 12 months or in the next 3 months  · ALT & LDL on Ijeoma Mena MD    Office Visit        Future Appointments       Provider Department Appt Notes    In 3 months Norberto Hilliard MD [facility], [address] [place]           Lab Results  Component Value Date   GLU 84 08/10/2017   BUN 9 08/10

## 2018-03-02 RX ORDER — LEVOTHYROXINE SODIUM 0.15 MG/1
TABLET ORAL
Qty: 90 TABLET | Refills: 0 | Status: SHIPPED | OUTPATIENT
Start: 2018-03-02 | End: 2018-05-15

## 2018-03-20 ENCOUNTER — TELEPHONE (OUTPATIENT)
Dept: INTERNAL MEDICINE CLINIC | Facility: CLINIC | Age: 73
End: 2018-03-20

## 2018-04-06 ENCOUNTER — MED REC SCAN ONLY (OUTPATIENT)
Dept: INTERNAL MEDICINE CLINIC | Facility: CLINIC | Age: 73
End: 2018-04-06

## 2018-05-16 RX ORDER — LEVOTHYROXINE SODIUM 137 UG/1
137 TABLET ORAL
Qty: 90 TABLET | Refills: 0 | Status: SHIPPED | OUTPATIENT
Start: 2018-05-16 | End: 2018-07-10

## 2018-05-16 NOTE — TELEPHONE ENCOUNTER
Patient failed protocol. Script pended. Please advise.     Hypothyroid Medications  Protocol Criteria:  Appointment scheduled in the past 12 months or the next 3 months  TSH resulted in the past 12 months that is normal  Recent Outpatient Visits

## 2018-07-10 ENCOUNTER — OFFICE VISIT (OUTPATIENT)
Dept: INTERNAL MEDICINE CLINIC | Facility: CLINIC | Age: 73
End: 2018-07-10

## 2018-07-10 VITALS
DIASTOLIC BLOOD PRESSURE: 66 MMHG | HEART RATE: 73 BPM | BODY MASS INDEX: 21.2 KG/M2 | SYSTOLIC BLOOD PRESSURE: 107 MMHG | HEIGHT: 65.5 IN | WEIGHT: 128.81 LBS

## 2018-07-10 DIAGNOSIS — Z09 FOLLOW UP: ICD-10-CM

## 2018-07-10 DIAGNOSIS — E03.9 ACQUIRED HYPOTHYROIDISM: ICD-10-CM

## 2018-07-10 DIAGNOSIS — R51.9 PAIN, HEAD AND FACE: Primary | ICD-10-CM

## 2018-07-10 DIAGNOSIS — E78.00 HYPERCHOLESTEROLEMIA: ICD-10-CM

## 2018-07-10 DIAGNOSIS — I10 ESSENTIAL HYPERTENSION: ICD-10-CM

## 2018-07-10 PROBLEM — M26.609 TMJ (TEMPOROMANDIBULAR JOINT DISORDER): Status: RESOLVED | Noted: 2018-02-06 | Resolved: 2018-07-10

## 2018-07-10 PROCEDURE — G0463 HOSPITAL OUTPT CLINIC VISIT: HCPCS | Performed by: INTERNAL MEDICINE

## 2018-07-10 PROCEDURE — 99214 OFFICE O/P EST MOD 30 MIN: CPT | Performed by: INTERNAL MEDICINE

## 2018-07-10 NOTE — PROGRESS NOTES
HPI:    Patient ID: Alexis Louie is a 67year old female. Pt c/o swelling lately. It has been hot outside. No SOB, chest pain. Her ear still hurts and she cannot lay on that side. It wakes her from sleep. The whole left side of her head hurts.   It (CLOVER ROOT) 250 MG Oral Cap Take by mouth. Disp:  Rfl:    aspirin 81 MG Oral Tab EC Take 1 tablet (81 mg total) by mouth daily.  Disp: 30 tablet Rfl: 0   Pantoprazole Sodium 40 MG Oral Tab EC Take 1 tablet (40 mg total) by mouth every morning before break deficit. ASSESSMENT/PLAN:     Problem List Items Addressed This Visit        High    Pain, head and face - Primary     Patient continues to complain of pain in her left ear when she lays on that side.   It is very bothersome and wakes her from

## 2018-07-11 NOTE — ASSESSMENT & PLAN NOTE
Patient continues to complain of pain in her left ear when she lays on that side. It is very bothersome and wakes her from sleep. I can find nothing significant on exam and there are no associated symptoms.   This may be a sign of trigeminal neuralgia and

## 2018-07-13 ENCOUNTER — LAB ENCOUNTER (OUTPATIENT)
Dept: LAB | Facility: HOSPITAL | Age: 73
End: 2018-07-13
Attending: INTERNAL MEDICINE
Payer: MEDICARE

## 2018-07-13 ENCOUNTER — HOSPITAL ENCOUNTER (OUTPATIENT)
Dept: CT IMAGING | Facility: HOSPITAL | Age: 73
Discharge: HOME | End: 2018-07-13
Attending: INTERNAL MEDICINE
Payer: MEDICARE

## 2018-07-13 DIAGNOSIS — E03.9 ACQUIRED HYPOTHYROIDISM: ICD-10-CM

## 2018-07-13 DIAGNOSIS — R51.9 PAIN, HEAD AND FACE: ICD-10-CM

## 2018-07-13 DIAGNOSIS — E78.00 HYPERCHOLESTEROLEMIA: ICD-10-CM

## 2018-07-13 LAB
ALBUMIN SERPL BCP-MCNC: 4.4 G/DL (ref 3.5–4.8)
ALBUMIN/GLOB SERPL: 1.3 {RATIO} (ref 1–2)
ALP SERPL-CCNC: 47 U/L (ref 32–100)
ALT SERPL-CCNC: 11 U/L (ref 14–54)
ANION GAP SERPL CALC-SCNC: 8 MMOL/L (ref 0–18)
AST SERPL-CCNC: 18 U/L (ref 15–41)
BASOPHILS # BLD: 0 K/UL (ref 0–0.2)
BASOPHILS NFR BLD: 1 %
BILIRUB SERPL-MCNC: 1.2 MG/DL (ref 0.3–1.2)
BUN SERPL-MCNC: 19 MG/DL (ref 8–20)
BUN/CREAT SERPL: 22.1 (ref 10–20)
CALCIUM SERPL-MCNC: 9.7 MG/DL (ref 8.5–10.5)
CHLORIDE SERPL-SCNC: 98 MMOL/L (ref 95–110)
CHOLEST SERPL-MCNC: 260 MG/DL (ref 110–200)
CO2 SERPL-SCNC: 32 MMOL/L (ref 22–32)
CREAT BLD-MCNC: 0.8 MG/DL (ref 0.5–1.5)
CREAT SERPL-MCNC: 0.86 MG/DL (ref 0.5–1.5)
EOSINOPHIL # BLD: 0.1 K/UL (ref 0–0.7)
EOSINOPHIL NFR BLD: 3 %
ERYTHROCYTE [DISTWIDTH] IN BLOOD BY AUTOMATED COUNT: 13.5 % (ref 11–15)
GLOBULIN PLAS-MCNC: 3.5 G/DL (ref 2.5–3.7)
GLUCOSE SERPL-MCNC: 107 MG/DL (ref 70–99)
HCT VFR BLD AUTO: 36.5 % (ref 35–48)
HDLC SERPL-MCNC: 85 MG/DL
HGB BLD-MCNC: 12.1 G/DL (ref 12–16)
LDLC SERPL CALC-MCNC: 160 MG/DL (ref 0–99)
LYMPHOCYTES # BLD: 2.4 K/UL (ref 1–4)
LYMPHOCYTES NFR BLD: 41 %
MCH RBC QN AUTO: 31.9 PG (ref 27–32)
MCHC RBC AUTO-ENTMCNC: 33.2 G/DL (ref 32–37)
MCV RBC AUTO: 96.1 FL (ref 80–100)
MONOCYTES # BLD: 0.5 K/UL (ref 0–1)
MONOCYTES NFR BLD: 9 %
NEUTROPHILS # BLD AUTO: 2.7 K/UL (ref 1.8–7.7)
NEUTROPHILS NFR BLD: 47 %
NONHDLC SERPL-MCNC: 175 MG/DL
OSMOLALITY UR CALC.SUM OF ELEC: 289 MOSM/KG (ref 275–295)
PATIENT FASTING: YES
PLATELET # BLD AUTO: 253 K/UL (ref 140–400)
PMV BLD AUTO: 7.8 FL (ref 7.4–10.3)
POTASSIUM SERPL-SCNC: 3.7 MMOL/L (ref 3.3–5.1)
PROT SERPL-MCNC: 7.9 G/DL (ref 5.9–8.4)
RBC # BLD AUTO: 3.8 M/UL (ref 3.7–5.4)
SODIUM SERPL-SCNC: 138 MMOL/L (ref 136–144)
TRIGL SERPL-MCNC: 75 MG/DL (ref 1–149)
TSH SERPL-ACNC: 2.6 UIU/ML (ref 0.45–5.33)
WBC # BLD AUTO: 5.9 K/UL (ref 4–11)

## 2018-07-13 PROCEDURE — 80053 COMPREHEN METABOLIC PANEL: CPT

## 2018-07-13 PROCEDURE — 85025 COMPLETE CBC W/AUTO DIFF WBC: CPT

## 2018-07-13 PROCEDURE — 84443 ASSAY THYROID STIM HORMONE: CPT

## 2018-07-13 PROCEDURE — 36415 COLL VENOUS BLD VENIPUNCTURE: CPT

## 2018-07-13 PROCEDURE — 80061 LIPID PANEL: CPT

## 2018-07-13 PROCEDURE — 82565 ASSAY OF CREATININE: CPT

## 2018-07-13 PROCEDURE — 70470 CT HEAD/BRAIN W/O & W/DYE: CPT | Performed by: INTERNAL MEDICINE

## 2018-07-14 NOTE — PROGRESS NOTES
Please call pt: The CT showed that you have a cyst or polyp in the maxillary sinus, which may be causing your headaches. I recommend that you discuss this with Dr. Davy Archibald.   The CT also showed that there is some hardening of the arteries of the blood vesse

## 2018-07-17 ENCOUNTER — TELEPHONE (OUTPATIENT)
Dept: OTHER | Age: 73
End: 2018-07-17

## 2018-07-17 RX ORDER — ATORVASTATIN CALCIUM 40 MG/1
40 TABLET, FILM COATED ORAL NIGHTLY
Qty: 90 TABLET | Refills: 3 | Status: SHIPPED | OUTPATIENT
Start: 2018-07-17 | End: 2019-05-01

## 2018-07-17 NOTE — TELEPHONE ENCOUNTER
Simvastatin does not seem to be working so I want her to stop taking it. I will change it to atorvastatin. I sent a new prescription to her pharmacy.

## 2018-07-17 NOTE — TELEPHONE ENCOUNTER
Dr ALARCON=FYI!! Spoke with patient (identified name and ) ,results reviewed and agrees with  Plan. With OV with Dr Danna Cintron on 18. States that she is taking the Simvastatin daily.     Notes recorded by Megha Nugent MD on 2018 at 4:30 PM CDT  Jose J

## 2018-07-17 NOTE — TELEPHONE ENCOUNTER
Called pt and informed of MD comments   Pt verbalized understanding, and will go  new prescription and stop the Simvastatin

## 2018-07-30 ENCOUNTER — TELEPHONE (OUTPATIENT)
Dept: INTERNAL MEDICINE CLINIC | Facility: CLINIC | Age: 73
End: 2018-07-30

## 2018-07-30 ENCOUNTER — OFFICE VISIT (OUTPATIENT)
Dept: OTOLARYNGOLOGY | Facility: CLINIC | Age: 73
End: 2018-07-30

## 2018-07-30 VITALS
HEIGHT: 65.5 IN | SYSTOLIC BLOOD PRESSURE: 120 MMHG | DIASTOLIC BLOOD PRESSURE: 60 MMHG | BODY MASS INDEX: 21.19 KG/M2 | WEIGHT: 128.75 LBS | TEMPERATURE: 98 F

## 2018-07-30 DIAGNOSIS — R51.9 HEADACHE DISORDER: Primary | ICD-10-CM

## 2018-07-30 PROCEDURE — 99213 OFFICE O/P EST LOW 20 MIN: CPT | Performed by: OTOLARYNGOLOGY

## 2018-07-30 PROCEDURE — G0463 HOSPITAL OUTPT CLINIC VISIT: HCPCS | Performed by: OTOLARYNGOLOGY

## 2018-07-30 NOTE — TELEPHONE ENCOUNTER
Patient was given a referral by Dr. Ashtyn Hendrix to Dr. Erinn Villatoro. Patient is wondering if she also needs a referral from Dr. Rach Stephen as well. Please advise.

## 2018-07-30 NOTE — PROGRESS NOTES
Stevan Rueda is a 67year old female. Patient presents with:  Ear Problem: left ear pain for 6 months    HPI:   She continues to experience pain in the left side of her face including her ear and temple and neck. Pain happens a lot at nighttime.   She can otherwise  GENERAL : denies fever, chills, sweats, weight loss, weight gain  SKIN: denies any unusual skin lesions or rashes  RESPIRATORY: denies shortness of breath with exertion  NEURO: denies headaches    EXAM:   /60   Temp 97.9 °F (36.6 °C) (Tymp

## 2018-07-30 NOTE — TELEPHONE ENCOUNTER
Dr. Aby Houston didn't give her a referral.  I did.   There is one in the computer that I gave her on July 10

## 2018-08-07 ENCOUNTER — OFFICE VISIT (OUTPATIENT)
Dept: INTERNAL MEDICINE CLINIC | Facility: CLINIC | Age: 73
End: 2018-08-07
Payer: MEDICARE

## 2018-08-07 VITALS
TEMPERATURE: 99 F | SYSTOLIC BLOOD PRESSURE: 107 MMHG | BODY MASS INDEX: 21.02 KG/M2 | WEIGHT: 127.69 LBS | HEIGHT: 65.5 IN | DIASTOLIC BLOOD PRESSURE: 63 MMHG | HEART RATE: 71 BPM

## 2018-08-07 DIAGNOSIS — R51.9 PAIN, HEAD AND FACE: ICD-10-CM

## 2018-08-07 DIAGNOSIS — Z78.0 ASYMPTOMATIC MENOPAUSE: ICD-10-CM

## 2018-08-07 DIAGNOSIS — E78.00 HYPERCHOLESTEROLEMIA: ICD-10-CM

## 2018-08-07 DIAGNOSIS — Z12.31 VISIT FOR SCREENING MAMMOGRAM: ICD-10-CM

## 2018-08-07 DIAGNOSIS — E03.9 ACQUIRED HYPOTHYROIDISM: ICD-10-CM

## 2018-08-07 DIAGNOSIS — Z00.00 MEDICARE ANNUAL WELLNESS VISIT, SUBSEQUENT: Primary | ICD-10-CM

## 2018-08-07 DIAGNOSIS — I10 ESSENTIAL HYPERTENSION: ICD-10-CM

## 2018-08-07 PROBLEM — M54.2 NECK PAIN: Status: RESOLVED | Noted: 2017-11-02 | Resolved: 2018-08-07

## 2018-08-07 PROBLEM — G50.0 TRIGEMINAL NEURALGIA OF LEFT SIDE OF FACE: Status: ACTIVE | Noted: 2018-08-07

## 2018-08-07 PROCEDURE — 96160 PT-FOCUSED HLTH RISK ASSMT: CPT | Performed by: INTERNAL MEDICINE

## 2018-08-07 PROCEDURE — G0439 PPPS, SUBSEQ VISIT: HCPCS | Performed by: INTERNAL MEDICINE

## 2018-08-07 RX ORDER — LEVOTHYROXINE SODIUM 137 UG/1
137 TABLET ORAL
Qty: 90 TABLET | Refills: 3 | Status: SHIPPED | OUTPATIENT
Start: 2018-08-07 | End: 2019-03-01

## 2018-08-07 RX ORDER — CARBAMAZEPINE 100 MG/1
100 TABLET, EXTENDED RELEASE ORAL 2 TIMES DAILY
Qty: 60 TABLET | Refills: 1 | Status: SHIPPED | OUTPATIENT
Start: 2018-08-07 | End: 2018-08-07

## 2018-08-07 NOTE — PROGRESS NOTES
HPI:   Anna Rojas is a 67year old female who presents for a MA (Medicare Advantage) Supervisit (Once per calendar year). The left side of her face still hurts and wakes her from sleep. She saw the ENT.       Ear Pain    There is pain in the left e NOT have it on file in [address] [place] [address] Rd. The patient has this document but we do not have it in [facility], and patient is instructed to get our office a copy of it for scanning into Epic. She smoked tobacco in the past but quit greater than 12 months ago.   Smok Oral Tab Take 1 tablet (40 mg total) by mouth nightly. HYDROCHLOROTHIAZIDE 12.5 MG Oral Cap TAKE 1 CAPSULE EVERY DAY   Cholecalciferol (VITAMIN D3) 400 units Oral Tab Take by mouth. Vitamin C 500 MG Oral Tab Take 500 mg by mouth daily.    Julito Face and menstrual problem. Musculoskeletal: Negative for joint pain. Skin: Negative for rash. Allergic/Immunologic: Negative for environmental allergies and food allergies. Neurological: Positive for headaches.    Psychiatric/Behavioral: Negative for de think I may have hearing loss: No               Visual Acuity                           Physical Exam   Nursing note and vitals reviewed. Constitutional: She is oriented to person, place, and time. She appears well-developed and well-nourished.  No distr Medicare annual wellness visit, subsequent - Primary     Unremarkable exam.  Labs were reviewed  Pt is up-to-date on colonoscopy (pg 18 media section)  Immunizations were reviewed.   Fall risk assessment was negative  Hearing assessment was normal. Procedure External Lab or Procedure   Diabetes Screening      HbgA1C   Annually No results found for: A1C No flowsheet data found.     Fasting Blood Sugar (FSB)Annually   Glucose (mg/dL)   Date Value   07/13/2018 107 (H)   ----------       Cardiovascular Imelda Wolfe End-stage renal disease   Hemophiliacs who received Factor VIII or IX concentrates   Clients of institutions for the mentally retarded   Persons who live in the same house as a HepB virus carrier   Homosexual men   Illicit injectable drug abusers     Tet

## 2018-08-07 NOTE — PATIENT INSTRUCTIONS
Sekou Roblero's SCREENING SCHEDULE   Tests on this list are recommended by your physician but may not be covered, or covered at this frequency, by your insurer. Please check with your insurance carrier before scheduling to verify coverage.    PREVENTATIVE years- more often if abnormal Colonoscopy due on 06/21/2026 Update Health Maintenance if applicable    Flex Sigmoidoscopy Screen  Covered every 5 years No results found for this or any previous visit. No flowsheet data found.      Fecal Occult Blood   Cover 65th birthday    Pneumococcal 23 (Pneumovax)  Covered Once after 65 No orders found for this or any previous visit. Please get once after your 65th birthday    Hepatitis B for Moderate/High Risk       No orders found for this or any previous visit.  Medium/

## 2018-08-07 NOTE — ASSESSMENT & PLAN NOTE
This bothers her most when she lays on her face. Likely trigeminal neuralgia. Will start carbamazepine 100 bid. F/u 1 month.

## 2018-08-07 NOTE — ASSESSMENT & PLAN NOTE
Unremarkable exam.  Labs were reviewed  Pt is up-to-date on colonoscopy (pg 18 media section)  Immunizations were reviewed.   Fall risk assessment was negative  Hearing assessment was normal.

## 2018-08-07 NOTE — TELEPHONE ENCOUNTER
Pt has appointment today any questions will be addressed today  Future Appointments  Date Time Provider Carmen Chapman   8/7/2018 11:10 AM Negar Figueroa MD [facility]   10/2/2018 2:20 PM Fransico Payne MD [facility]

## 2018-08-09 RX ORDER — CARBAMAZEPINE 100 MG/1
TABLET, EXTENDED RELEASE ORAL
Qty: 180 TABLET | Refills: 1 | Status: SHIPPED | OUTPATIENT
Start: 2018-08-09 | End: 2018-10-02

## 2018-08-13 ENCOUNTER — TELEPHONE (OUTPATIENT)
Dept: INTERNAL MEDICINE CLINIC | Facility: CLINIC | Age: 73
End: 2018-08-13

## 2018-08-13 RX ORDER — CARBAMAZEPINE 200 MG/1
200 TABLET ORAL 2 TIMES DAILY
Qty: 60 TABLET | Refills: 1 | Status: SHIPPED | OUTPATIENT
Start: 2018-08-13 | End: 2018-08-13

## 2018-08-13 NOTE — TELEPHONE ENCOUNTER
Reviewed doctor's recommendations with pt, appt scheduled 9/12/18. Pt had no further questions at this time.

## 2018-08-13 NOTE — TELEPHONE ENCOUNTER
I ordered a different medication which should be less expensive. Please reschedule her appointment for 1 month from now.   (I had actually meant 1 month, not 1 week, anyway.)

## 2018-08-13 NOTE — TELEPHONE ENCOUNTER
Dr Janette Duckworth, please advise. Patient stated she did not start the CARBAMAZEPINE  MG Oral Tablet 12 Hr, when she went to get it at the [facility], they had a 1/2 order for a co-pay of $57, which is too expensive for her.  She said she would like some

## 2018-08-14 RX ORDER — CARBAMAZEPINE 200 MG/1
TABLET ORAL
Qty: 180 TABLET | Refills: 0 | Status: SHIPPED | OUTPATIENT
Start: 2018-08-14 | End: 2020-03-03

## 2018-08-16 NOTE — TELEPHONE ENCOUNTER
I think $80 was for the 90 day supply that she requested. Why doesn't she try a 2 week supply. I think perhaps she could break the tabs in half and take a half tab twice a day to start, so a 2 week supply would last a month.

## 2018-08-16 NOTE — TELEPHONE ENCOUNTER
Pt called in stating that the second medication that LV sent to the pharmacy would cost her $80 out of pocket. Pt stated that she can not afford this medication and would like to know if there is a medication that she can be prescribed that costs less.   P

## 2018-08-17 NOTE — TELEPHONE ENCOUNTER
Spoke with patient and advised Dr Sonali Charles note and verbalized understanding.   Future Appointments  Date Time Provider Carmen Chapman   9/12/2018 11:40 AM Marah Harding MD [facility]         Note      I think $80 was for the 90 day supply that sh

## 2018-09-07 NOTE — PROGRESS NOTES
Overdue result letter mailed to pt home Hx of Bulemia, c/o epigastric pain, dysphagia.  Hx of gastric ulcers.

## 2018-09-12 ENCOUNTER — OFFICE VISIT (OUTPATIENT)
Dept: INTERNAL MEDICINE CLINIC | Facility: CLINIC | Age: 73
End: 2018-09-12
Payer: MEDICARE

## 2018-09-12 VITALS
BODY MASS INDEX: 21.35 KG/M2 | HEIGHT: 65.5 IN | SYSTOLIC BLOOD PRESSURE: 120 MMHG | WEIGHT: 129.69 LBS | DIASTOLIC BLOOD PRESSURE: 67 MMHG | HEART RATE: 67 BPM

## 2018-09-12 DIAGNOSIS — E78.00 HYPERCHOLESTEROLEMIA: Primary | ICD-10-CM

## 2018-09-12 DIAGNOSIS — R51.9 PAIN, HEAD AND FACE: ICD-10-CM

## 2018-09-12 DIAGNOSIS — I10 ESSENTIAL HYPERTENSION: ICD-10-CM

## 2018-09-12 DIAGNOSIS — E03.9 ACQUIRED HYPOTHYROIDISM: ICD-10-CM

## 2018-09-12 PROCEDURE — 99214 OFFICE O/P EST MOD 30 MIN: CPT | Performed by: INTERNAL MEDICINE

## 2018-09-12 PROCEDURE — G0463 HOSPITAL OUTPT CLINIC VISIT: HCPCS | Performed by: INTERNAL MEDICINE

## 2018-09-12 NOTE — PROGRESS NOTES
HPI:    Patient ID: Marnie Crystal is a 67year old female. I changed the thyroid medication and it is okay. Her face pain bothers her sometimes, but not as much. She is taking the cholesterol medication. She has a foster boy who is very difficult. morning before breakfast. Disp: 90 tablet Rfl: 1   Meclizine HCl 12.5 MG Oral Tab Take 1 tablet (12.5 mg total) by mouth 3 (three) times daily as needed for Dizziness.  CAUSES DROWSINESS Disp: 30 tablet Rfl: 0   CARBAMAZEPINE  MG Oral Tablet 12 Hr MAEGAN CPM>         Relevant Orders    TSH W REFLEX TO FREE T4    Pain, head and face     Her pain has improved. CPM.                 The patient expressed understanding and agreed with the plan.     Meds This Visit:  Requested Prescriptions      No prescriptions

## 2018-09-18 ENCOUNTER — HOSPITAL ENCOUNTER (OUTPATIENT)
Dept: BONE DENSITY | Facility: HOSPITAL | Age: 73
Discharge: HOME | End: 2018-09-18
Attending: INTERNAL MEDICINE
Payer: MEDICARE

## 2018-09-18 ENCOUNTER — TELEPHONE (OUTPATIENT)
Dept: OTHER | Age: 73
End: 2018-09-18

## 2018-09-18 DIAGNOSIS — Z78.0 ASYMPTOMATIC MENOPAUSE: ICD-10-CM

## 2018-09-18 PROCEDURE — 77080 DXA BONE DENSITY AXIAL: CPT | Performed by: INTERNAL MEDICINE

## 2018-09-19 ENCOUNTER — TELEPHONE (OUTPATIENT)
Dept: INTERNAL MEDICINE CLINIC | Facility: CLINIC | Age: 73
End: 2018-09-19

## 2018-09-19 ENCOUNTER — LAB ENCOUNTER (OUTPATIENT)
Dept: LAB | Facility: HOSPITAL | Age: 73
End: 2018-09-19
Attending: INTERNAL MEDICINE
Payer: MEDICARE

## 2018-09-19 DIAGNOSIS — E78.00 HYPERCHOLESTEROLEMIA: ICD-10-CM

## 2018-09-19 DIAGNOSIS — E03.9 ACQUIRED HYPOTHYROIDISM: Primary | ICD-10-CM

## 2018-09-19 DIAGNOSIS — E03.9 ACQUIRED HYPOTHYROIDISM: ICD-10-CM

## 2018-09-19 LAB
ALT SERPL-CCNC: 12 U/L (ref 14–54)
AST SERPL-CCNC: 21 U/L (ref 15–41)
CHOLEST SERPL-MCNC: 225 MG/DL (ref 110–200)
HDLC SERPL-MCNC: 97 MG/DL
LDLC SERPL CALC-MCNC: 117 MG/DL (ref 0–99)
NONHDLC SERPL-MCNC: 128 MG/DL
T4 FREE SERPL-MCNC: 0.39 NG/DL (ref 0.58–1.64)
TRIGL SERPL-MCNC: 55 MG/DL (ref 1–149)
TSH SERPL-ACNC: 45.31 UIU/ML (ref 0.45–5.33)

## 2018-09-19 PROCEDURE — 84460 ALANINE AMINO (ALT) (SGPT): CPT

## 2018-09-19 PROCEDURE — 84439 ASSAY OF FREE THYROXINE: CPT

## 2018-09-19 PROCEDURE — 80061 LIPID PANEL: CPT

## 2018-09-19 PROCEDURE — 36415 COLL VENOUS BLD VENIPUNCTURE: CPT

## 2018-09-19 PROCEDURE — 84443 ASSAY THYROID STIM HORMONE: CPT

## 2018-09-19 PROCEDURE — 84450 TRANSFERASE (AST) (SGOT): CPT

## 2018-09-20 RX ORDER — LEVOTHYROXINE SODIUM 0.15 MG/1
150 TABLET ORAL
Qty: 90 TABLET | Refills: 0 | OUTPATIENT
Start: 2018-09-20

## 2018-09-20 NOTE — TELEPHONE ENCOUNTER
Pt states that occasionally she does forget to take the thyroid medicine. I suggested the pillbox and she states that she got one! I advised of the lab recheck in two months. Pt verbalized understanding and need for compliance with daily meds.  Pt does n

## 2018-09-20 NOTE — TELEPHONE ENCOUNTER
Call pt:  Her cholesterol is much better. I recommend that she continue the current atorvastatin. It looks like her thyroid level is low again. Has she been taking it every single day? If she has, I will increase the dose.   If she thinks she has been f

## 2018-10-02 ENCOUNTER — OFFICE VISIT (OUTPATIENT)
Dept: NEUROLOGY | Facility: CLINIC | Age: 73
End: 2018-10-02

## 2018-10-02 ENCOUNTER — TELEPHONE (OUTPATIENT)
Dept: NEUROLOGY | Facility: CLINIC | Age: 73
End: 2018-10-02

## 2018-10-02 VITALS
BODY MASS INDEX: 21.07 KG/M2 | SYSTOLIC BLOOD PRESSURE: 130 MMHG | WEIGHT: 128 LBS | HEIGHT: 65.5 IN | DIASTOLIC BLOOD PRESSURE: 66 MMHG | HEART RATE: 64 BPM

## 2018-10-02 DIAGNOSIS — R51.9 HEADACHE DISORDER: Primary | ICD-10-CM

## 2018-10-02 DIAGNOSIS — M54.2 NECK PAIN: ICD-10-CM

## 2018-10-02 PROCEDURE — 99204 OFFICE O/P NEW MOD 45 MIN: CPT | Performed by: OTHER

## 2018-10-02 RX ORDER — TOPIRAMATE 25 MG/1
TABLET ORAL
Qty: 120 TABLET | Refills: 3 | Status: SHIPPED | OUTPATIENT
Start: 2018-10-02 | End: 2020-03-03

## 2018-10-02 NOTE — TELEPHONE ENCOUNTER
[employer] for authorization of approval for MRI brain w.wo. Tracking # [id number] pending approval    [employer] for authorization of approval for MRA brain wo.   Tracking # [id number] pending approval

## 2018-10-02 NOTE — PROGRESS NOTES
Ms Kallie Coy, relates a 2-year history of originally left lateral face near throbbing pain which is constant. She states she saw 2 ENT physician. CT of the head report reviewed. Couple month history of left cervical spine pain.   She denies upper extremity

## 2018-10-08 ENCOUNTER — TELEPHONE (OUTPATIENT)
Dept: NEUROLOGY | Facility: CLINIC | Age: 73
End: 2018-10-08

## 2018-10-08 NOTE — TELEPHONE ENCOUNTER
Called [facility] to verify MRI Facility ID # [id number]([id number]). T/t Faheem SONG who added facility. Will call [facility] in the am for authorization numbers.

## 2018-10-08 NOTE — TELEPHONE ENCOUNTER
Received in basket from [email or web address]  advising of approval for physical therapy. Will call Pt. To inform. Pt. informed 8 PT sessions were approved. Can proceed with scheduling appt.

## 2018-10-09 NOTE — TELEPHONE ENCOUNTER
I called that number and left a voicemail for that physician discussing the clinical indications, concerns warranting the need for the MRI of the brain to be approved and performed. Left him my name, office number.

## 2018-10-09 NOTE — TELEPHONE ENCOUNTER
Dr. Wang Parsons (located in [place], [place]) calling to do a peer to peer for MRI brain. Call back number is [phone]. Would need a return call within 24hrs.

## 2018-10-09 NOTE — TELEPHONE ENCOUNTER
[address] [place] [address] Help to check on status of approvals. T/t Boyd Parekh nurse reviewer, additional information was provided.  Will take up to 48 hrs for decision/approval.

## 2018-10-11 ENCOUNTER — OFFICE VISIT (OUTPATIENT)
Dept: PHYSICAL THERAPY | Facility: HOSPITAL | Age: 73
End: 2018-10-11
Attending: Other
Payer: MEDICARE

## 2018-10-11 DIAGNOSIS — M54.2 NECK PAIN: ICD-10-CM

## 2018-10-11 PROCEDURE — 97162 PT EVAL MOD COMPLEX 30 MIN: CPT | Performed by: PHYSICAL THERAPIST

## 2018-10-11 PROCEDURE — 97110 THERAPEUTIC EXERCISES: CPT | Performed by: PHYSICAL THERAPIST

## 2018-10-11 NOTE — PROGRESS NOTES
CERVICAL EVALUATION:    Referring Physician: Marycruz Atwood    DX Code: Neck pain (M54.2)     PT DX: Neck pain (M54.2)    PCP: Veronica Del Rio MD     Age: 67year old  Occupation: retired    DOI: 2 years ago  DOS: -         SUBJECTIVE:   HX of Symptoms: insi flexion 4-/5    DTRs:  (Scale of 0 to +3) (R) (L)   Biceps (C5/6):     Brachioradialis(C5/6):     Triceps (C6/7):            Sensation:  Intact to light touch   Palpation Summary: tender over left SCM, left UT and levator scapulae as well as left periscapu seen 2 x /week for 4-6 weeks or a total of 8-12 visits.    Treatment will include:  · Manual therapy to address joint and/or soft tissue mobility  · Therapeutic exercises  · Pt. education for posture, body mechanics, and ergonomics  · HEP instruction

## 2018-10-15 ENCOUNTER — OFFICE VISIT (OUTPATIENT)
Dept: PHYSICAL THERAPY | Facility: HOSPITAL | Age: 73
End: 2018-10-15
Attending: Other
Payer: MEDICARE

## 2018-10-15 PROCEDURE — 97110 THERAPEUTIC EXERCISES: CPT | Performed by: PHYSICAL THERAPIST

## 2018-10-15 PROCEDURE — 97140 MANUAL THERAPY 1/> REGIONS: CPT | Performed by: PHYSICAL THERAPIST

## 2018-10-15 NOTE — PROGRESS NOTES
Diagnosis:  Neck pain (M54.2)   Authorized # of Visits:  12         Next MD visit: none scheduled  Fall Risk: standard         Precautions: n/a           Medication Changes since last visit?: No  Subjective: doing HEP, sleeping better but still avoiding ly

## 2018-10-17 ENCOUNTER — APPOINTMENT (OUTPATIENT)
Dept: PHYSICAL THERAPY | Facility: HOSPITAL | Age: 73
End: 2018-10-17
Attending: Other
Payer: MEDICARE

## 2018-10-17 PROCEDURE — 97110 THERAPEUTIC EXERCISES: CPT

## 2018-10-17 PROCEDURE — 97140 MANUAL THERAPY 1/> REGIONS: CPT

## 2018-10-17 NOTE — PROGRESS NOTES
Diagnosis:  Neck pain (M54.2)   Authorized # of Visits:  12         Next MD visit: none scheduled  Fall Risk: standard         Precautions: prior cervical fusion (C5-6?) over 10 years ago.  HTN           Medication Changes since last visit?: No  Subjective: to cont HEP, watch posture, try sleeping on left side. Skilled Services: TE, MT, pt ed.     Charges: TE1, MT2       Total Timed Treatment: 42 min  Total Treatment Time: 52 min

## 2018-10-18 ENCOUNTER — APPOINTMENT (OUTPATIENT)
Dept: PHYSICAL THERAPY | Facility: HOSPITAL | Age: 73
End: 2018-10-18
Attending: Other
Payer: MEDICARE

## 2018-10-22 ENCOUNTER — OFFICE VISIT (OUTPATIENT)
Dept: PHYSICAL THERAPY | Facility: HOSPITAL | Age: 73
End: 2018-10-22
Attending: Other
Payer: MEDICARE

## 2018-10-22 PROCEDURE — 97110 THERAPEUTIC EXERCISES: CPT | Performed by: PHYSICAL THERAPIST

## 2018-10-22 PROCEDURE — 97140 MANUAL THERAPY 1/> REGIONS: CPT | Performed by: PHYSICAL THERAPIST

## 2018-10-22 NOTE — PROGRESS NOTES
Diagnosis:  Neck pain (M54.2)   Authorized # of Visits:  12         Next MD visit: none scheduled  Fall Risk: standard         Precautions: prior cervical fusion (C5-6?) over 10 years ago.  HTN           Medication Changes since last visit?: No  Subjective: session except mild soreness L shoulder with abd. Goals:   to be reached in 8-12 visits. · Pt. will report decreased pain from 10/10 to 3/10 at worst.  · Increase passive range of motion of cervical rotation to 60 degrees bilaterally without pain.

## 2018-10-24 ENCOUNTER — HOSPITAL ENCOUNTER (OUTPATIENT)
Dept: MRI IMAGING | Facility: HOSPITAL | Age: 73
Discharge: HOME | End: 2018-10-24
Attending: Other
Payer: MEDICARE

## 2018-10-24 DIAGNOSIS — R51.9 HEADACHE DISORDER: ICD-10-CM

## 2018-10-24 PROCEDURE — A9575 INJ GADOTERATE MEGLUMI 0.1ML: HCPCS | Performed by: OTHER

## 2018-10-24 PROCEDURE — 82565 ASSAY OF CREATININE: CPT

## 2018-10-24 PROCEDURE — 70553 MRI BRAIN STEM W/O & W/DYE: CPT | Performed by: OTHER

## 2018-10-24 PROCEDURE — 70544 MR ANGIOGRAPHY HEAD W/O DYE: CPT | Performed by: OTHER

## 2018-10-29 ENCOUNTER — OFFICE VISIT (OUTPATIENT)
Dept: PHYSICAL THERAPY | Facility: HOSPITAL | Age: 73
End: 2018-10-29
Attending: Other
Payer: MEDICARE

## 2018-10-29 PROCEDURE — 97140 MANUAL THERAPY 1/> REGIONS: CPT | Performed by: PHYSICAL THERAPIST

## 2018-10-29 PROCEDURE — 97110 THERAPEUTIC EXERCISES: CPT | Performed by: PHYSICAL THERAPIST

## 2018-10-29 NOTE — PROGRESS NOTES
Diagnosis:  Neck pain (M54.2)   Authorized # of Visits:  12         Next MD visit: none scheduled  Fall Risk: standard         Precautions: prior cervical fusion (C5-6?) over 10 years ago.  HTN           Medication Changes since last visit?: No  Subjective: Supine upper cspine lateral flexion gr 2 x 5 min Supine cerv retract 2 x 10  Supine upper cspine lateral flexion gr 2 x 2 min -        Supine cane flex/abd, press x 30 ea Supine cane flex/abd, press x 30 ea               Supine HP to neck x 10 min Seat

## 2018-11-01 ENCOUNTER — OFFICE VISIT (OUTPATIENT)
Dept: PHYSICAL THERAPY | Facility: HOSPITAL | Age: 73
End: 2018-11-01
Attending: Other
Payer: MEDICARE

## 2018-11-01 PROCEDURE — 97110 THERAPEUTIC EXERCISES: CPT | Performed by: PHYSICAL THERAPIST

## 2018-11-01 PROCEDURE — 97140 MANUAL THERAPY 1/> REGIONS: CPT | Performed by: PHYSICAL THERAPIST

## 2018-11-01 NOTE — PROGRESS NOTES
Diagnosis:  Neck pain (M54.2)   Authorized # of Visits:  12         Next MD visit: none scheduled  Fall Risk: standard         Precautions: prior cervical fusion (C5-6?) over 10 years ago.  HTN           Medication Changes since last visit?: No  Subjective: Supine man rot mobs gr 3 x 4 min Supine man rot mobs gr 3 x 4 min Supine man rot mobs gr 3 x 4 min     Supine man L cervica sideflexion mobs gr 2-3 x 5 min Supine cerv rot to L 2 x 10 Supine man L cervica sideflexion mobs gr 2-3 x 2 min Supine man L cervic

## 2018-11-05 ENCOUNTER — APPOINTMENT (OUTPATIENT)
Dept: PHYSICAL THERAPY | Facility: HOSPITAL | Age: 73
End: 2018-11-05
Attending: Other
Payer: MEDICARE

## 2018-11-08 ENCOUNTER — OFFICE VISIT (OUTPATIENT)
Dept: PHYSICAL THERAPY | Facility: HOSPITAL | Age: 73
End: 2018-11-08
Attending: Other
Payer: MEDICARE

## 2018-11-08 PROCEDURE — 97110 THERAPEUTIC EXERCISES: CPT | Performed by: PHYSICAL THERAPIST

## 2018-11-08 PROCEDURE — 97140 MANUAL THERAPY 1/> REGIONS: CPT | Performed by: PHYSICAL THERAPIST

## 2018-11-08 NOTE — PROGRESS NOTES
Diagnosis:  Neck pain (M54.2)   Authorized # of Visits:  12         Next MD visit: none scheduled  Fall Risk: standard         Precautions: prior cervical fusion (C5-6?) over 10 years ago.  HTN           Medication Changes since last visit?: No  Subjective: neck,periscap mm x 4 min    Supine man traction x 5 min Supine c traction x 5 min Supine man c traction x 5 min Supine man c traction x 5 min Supine man c traction x 5 min     Supine man rot mobs gr 3 x 5 min Supine man rot mobs to L x 5 min Supine man rot

## 2018-11-11 ENCOUNTER — HOSPITAL ENCOUNTER (OUTPATIENT)
Dept: MAMMOGRAPHY | Facility: HOSPITAL | Age: 73
Discharge: HOME | End: 2018-11-11
Attending: INTERNAL MEDICINE
Payer: MEDICARE

## 2018-11-11 DIAGNOSIS — Z12.31 VISIT FOR SCREENING MAMMOGRAM: ICD-10-CM

## 2018-11-11 PROCEDURE — 77063 BREAST TOMOSYNTHESIS BI: CPT | Performed by: INTERNAL MEDICINE

## 2018-11-11 PROCEDURE — 77067 SCR MAMMO BI INCL CAD: CPT | Performed by: INTERNAL MEDICINE

## 2018-11-15 ENCOUNTER — HOSPITAL ENCOUNTER (OUTPATIENT)
Dept: MAMMOGRAPHY | Facility: HOSPITAL | Age: 73
Discharge: HOME | End: 2018-11-15
Attending: INTERNAL MEDICINE
Payer: MEDICARE

## 2018-11-15 DIAGNOSIS — R92.8 ABNORMAL MAMMOGRAM: ICD-10-CM

## 2018-11-15 PROCEDURE — 77065 DX MAMMO INCL CAD UNI: CPT | Performed by: INTERNAL MEDICINE

## 2018-11-15 PROCEDURE — 77061 BREAST TOMOSYNTHESIS UNI: CPT | Performed by: INTERNAL MEDICINE

## 2018-12-19 ENCOUNTER — OFFICE VISIT (OUTPATIENT)
Dept: INTERNAL MEDICINE CLINIC | Facility: CLINIC | Age: 73
End: 2018-12-19
Payer: MEDICARE

## 2018-12-19 VITALS
SYSTOLIC BLOOD PRESSURE: 125 MMHG | DIASTOLIC BLOOD PRESSURE: 71 MMHG | BODY MASS INDEX: 21.97 KG/M2 | HEIGHT: 65.5 IN | WEIGHT: 133.5 LBS | HEART RATE: 76 BPM

## 2018-12-19 DIAGNOSIS — E03.9 ACQUIRED HYPOTHYROIDISM: Primary | ICD-10-CM

## 2018-12-19 DIAGNOSIS — I67.9 CEREBROVASCULAR DISEASE: ICD-10-CM

## 2018-12-19 DIAGNOSIS — E78.00 HYPERCHOLESTEROLEMIA: ICD-10-CM

## 2018-12-19 PROCEDURE — 99213 OFFICE O/P EST LOW 20 MIN: CPT | Performed by: INTERNAL MEDICINE

## 2018-12-19 NOTE — PROGRESS NOTES
HPI:    Patient ID: Hyacinth Montgomery is a 75year old female. No problems with her medication. She is taking the cholesterol and thyroid medication. The foster child is causing big problems and she is stressed.   She went for physical therapy for the ear Rfl: 3   atorvastatin 40 MG Oral Tab Take 1 tablet (40 mg total) by mouth nightly.  Disp: 90 tablet Rfl: 3   HYDROCHLOROTHIAZIDE 12.5 MG Oral Cap TAKE 1 CAPSULE EVERY DAY Disp: 90 capsule Rfl: 0   Cholecalciferol (VITAMIN D3) 400 units Oral Tab Take by mout place, and time. Psychiatric: She has a normal mood and affect.               ASSESSMENT/PLAN:     Problem List Items Addressed This Visit        High    Acquired hypothyroidism - Primary     She is due for another thyroid blood test.  I emphasized the im

## 2018-12-20 NOTE — ASSESSMENT & PLAN NOTE
We again discussed her CT brain, MRI, MRA results. Patient wants to prevent any further problems. Continue atorvastatin.

## 2018-12-28 ENCOUNTER — LAB ENCOUNTER (OUTPATIENT)
Dept: LAB | Facility: HOSPITAL | Age: 73
End: 2018-12-28
Attending: INTERNAL MEDICINE
Payer: MEDICARE

## 2018-12-28 DIAGNOSIS — E03.9 ACQUIRED HYPOTHYROIDISM: ICD-10-CM

## 2018-12-28 PROCEDURE — 84443 ASSAY THYROID STIM HORMONE: CPT

## 2018-12-28 PROCEDURE — 84439 ASSAY OF FREE THYROXINE: CPT

## 2018-12-28 PROCEDURE — 36415 COLL VENOUS BLD VENIPUNCTURE: CPT

## 2018-12-29 ENCOUNTER — TELEPHONE (OUTPATIENT)
Dept: INTERNAL MEDICINE CLINIC | Facility: CLINIC | Age: 73
End: 2018-12-29

## 2018-12-29 NOTE — TELEPHONE ENCOUNTER
Please call pt:  Her thyroid level is a little low. I'm not sure if I should increase the dose. Has she missed any doses? If she has not missed any doses, then I want to increase the dose.

## 2019-01-02 NOTE — TELEPHONE ENCOUNTER
Dr. Janette Duckworth: please note,     Results discussed with patient. Patient did confirm she forgot to take medication for 5 days sometime in December. Patient states she just got her medication in the mail and is taking her medication daily.  She did not wa

## 2019-01-19 ENCOUNTER — TELEPHONE (OUTPATIENT)
Dept: INTERNAL MEDICINE CLINIC | Facility: CLINIC | Age: 74
End: 2019-01-19

## 2019-01-19 DIAGNOSIS — H40.009 PREGLAUCOMA, UNSPECIFIED LATERALITY: Primary | ICD-10-CM

## 2019-01-19 NOTE — TELEPHONE ENCOUNTER
Dr Virginia Plasencia, please advise. Patient has eye doctor appt 1/23/19 and needs a referral.    Please respond to pool: EM [facility] LPN/CMA--please let patient know when referral is done.

## 2019-01-23 ENCOUNTER — TELEPHONE (OUTPATIENT)
Dept: INTERNAL MEDICINE CLINIC | Facility: CLINIC | Age: 74
End: 2019-01-23

## 2019-01-23 NOTE — TELEPHONE ENCOUNTER
Received call from Dr. Torres Meals office stating that there is a referral for pt in [facility] but they do not take referrals from [facility] and that it needs to be done through [facility],     This is the first time that this has been brought up   Manage Care please dena

## 2019-02-14 ENCOUNTER — MED REC SCAN ONLY (OUTPATIENT)
Dept: INTERNAL MEDICINE CLINIC | Facility: CLINIC | Age: 74
End: 2019-02-14

## 2019-02-28 ENCOUNTER — LAB ENCOUNTER (OUTPATIENT)
Dept: LAB | Facility: HOSPITAL | Age: 74
End: 2019-02-28
Attending: INTERNAL MEDICINE
Payer: MEDICARE

## 2019-02-28 ENCOUNTER — OFFICE VISIT (OUTPATIENT)
Dept: INTERNAL MEDICINE CLINIC | Facility: CLINIC | Age: 74
End: 2019-02-28
Payer: MEDICARE

## 2019-02-28 VITALS
SYSTOLIC BLOOD PRESSURE: 103 MMHG | HEIGHT: 65.5 IN | TEMPERATURE: 99 F | WEIGHT: 130.13 LBS | DIASTOLIC BLOOD PRESSURE: 68 MMHG | HEART RATE: 71 BPM | BODY MASS INDEX: 21.42 KG/M2

## 2019-02-28 DIAGNOSIS — R35.0 URINARY FREQUENCY: ICD-10-CM

## 2019-02-28 DIAGNOSIS — I10 ESSENTIAL HYPERTENSION: ICD-10-CM

## 2019-02-28 DIAGNOSIS — R51.9 PAIN, HEAD AND FACE: ICD-10-CM

## 2019-02-28 DIAGNOSIS — E03.9 ACQUIRED HYPOTHYROIDISM: ICD-10-CM

## 2019-02-28 DIAGNOSIS — I67.9 CEREBROVASCULAR DISEASE: ICD-10-CM

## 2019-02-28 DIAGNOSIS — R42 DIZZINESS: ICD-10-CM

## 2019-02-28 DIAGNOSIS — Z00.00 MEDICARE ANNUAL WELLNESS VISIT, SUBSEQUENT: Primary | ICD-10-CM

## 2019-02-28 DIAGNOSIS — E78.00 HYPERCHOLESTEROLEMIA: ICD-10-CM

## 2019-02-28 PROBLEM — Z78.0 ASYMPTOMATIC MENOPAUSE: Status: RESOLVED | Noted: 2018-08-07 | Resolved: 2019-02-28

## 2019-02-28 LAB
T4 FREE SERPL-MCNC: 1 NG/DL (ref 0.8–1.7)
TSI SER-ACNC: 9.42 MIU/ML (ref 0.36–3.74)

## 2019-02-28 PROCEDURE — G0439 PPPS, SUBSEQ VISIT: HCPCS | Performed by: INTERNAL MEDICINE

## 2019-02-28 PROCEDURE — 96160 PT-FOCUSED HLTH RISK ASSMT: CPT | Performed by: INTERNAL MEDICINE

## 2019-02-28 PROCEDURE — 84443 ASSAY THYROID STIM HORMONE: CPT

## 2019-02-28 PROCEDURE — 84439 ASSAY OF FREE THYROXINE: CPT

## 2019-02-28 PROCEDURE — 36415 COLL VENOUS BLD VENIPUNCTURE: CPT

## 2019-02-28 PROCEDURE — 99397 PER PM REEVAL EST PAT 65+ YR: CPT | Performed by: INTERNAL MEDICINE

## 2019-02-28 NOTE — PROGRESS NOTES
HPI:   Gus Dinero is a 75year old female who presents for a MA (Medicare Advantage) Supervisit (Once per calendar year). Pt is here for a physical.  All of her joints hurt. She has had urinary frequency for the past 2 months.   She has been having d directives standard forms performed Face to Face with patient and Family/surrogate (if present), and forms available to patient in AVS       She does NOT have a Power of  for [employer] on file in [address] [place] [address] Rd.    Advance care planning including the explan [methocarbamol].     CURRENT MEDICATIONS:     Outpatient Medications Marked as Taking for the 2/28/19 encounter (Office Visit) with Sloane Harris MD:  CARBAMAZEPINE 200 MG Oral Tab TAKE 1 TABLET BY MOUTH TWICE DAILY   Levothyroxine Sodium 137 MCG Oral Tab pain and palpitations. Gastrointestinal: Positive for constipation. Negative for nausea, vomiting, abdominal pain and blood in stool. Endocrine: Negative for cold intolerance and heat intolerance.    Genitourinary: Negative for dysuria, hematuria and me I misunderstand what they say:  No   I misunderstand what others are saying and make inappropriate responses:  No I avoid social activities because I cannot hear well and fear I will reply improperly:  No   Family members and friends have told me they thin Influenza 10/05/2011, 12/11/2012   • Pneumococcal (Prevnar 13) 08/10/2017   • Pneumovax 23 12/11/2012   • TDAP 06/06/2013   • Zoster Vaccine Live (Zostavax) 06/06/2013        ASSESSMENT AND OTHER RELEVANT CHRONIC CONDITIONS:   Rolanda Garcia is a 75 year ol frequency     Will check urine today. Relevant Orders    URINALYSIS, ROUTINE    URINE CULTURE, ROUTINE          Diet assessment: good     PLAN:  The patient indicates understanding of these issues and agrees to the plan.   Reinforced healthy diet, l DENSITOMETRY (CPT=77080) 09/18/2018    No flowsheet data found. Pap and Pelvic      Pap: Every 3 yrs age 21-68 or Pap+HPV every 5 yrs age 33-67, age 72 and older at high risk There are no preventive care reminders to display for this patient.  Update Hea found.    Drug Serum Conc  Annually No results found for: DIGOXIN, DIG, VALP No flowsheet data found.                Template: [facility]B MEDICARE ANNUAL ASSESSMENT FEMALE Gucci Mckeon [[id number]]

## 2019-02-28 NOTE — ASSESSMENT & PLAN NOTE
The patient also states she has a little bit of dizziness associated with her left facial pain. I will refer her to ENT for further evaluation of this also.

## 2019-02-28 NOTE — ASSESSMENT & PLAN NOTE
Unremarkable exam.  Labs were reviewed  Pt is up-to-date on colonoscopy. Immunizations were reviewed. Hearing assessment was normal.  Pt is at risk for falls.   Pt was given written information for fall prevention in the after visit summary

## 2019-02-28 NOTE — ASSESSMENT & PLAN NOTE
Microvascular changes were noted on the MRI of the brain. I advised pt of the problem and the need to control cardiovascular risk factors including hypercholesterolemia and HTN.

## 2019-02-28 NOTE — ASSESSMENT & PLAN NOTE
Pt has persistent pain on the left side of her face which began in July 2018. I thought it might be trigeminal neuralgia and I have been treating her with carbamazepine.  This may be helping somewhat since her pain seemed to be much more severe last year,

## 2019-02-28 NOTE — PATIENT INSTRUCTIONS
Rubia Roblero's SCREENING SCHEDULE   Tests on this list are recommended by your physician but may not be covered, or covered at this frequency, by your insurer. Please check with your insurance carrier before scheduling to verify coverage.    PREVENTATIVE if abnormal Colonoscopy due on 03/13/2023 Update [facility] if applicable    Flex Sigmoidoscopy Screen  Covered every 5 years No results found for this or any previous visit. No flowsheet data found.      Fecal Occult Blood   Covered Annually No res your 65th birthday    Pneumococcal 23 (Pneumovax)  Covered Once after 65 No orders found for this or any previous visit. Please get once after your 65th birthday    Hepatitis B for Moderate/High Risk       No orders found for this or any previous visit.  Me

## 2019-03-01 ENCOUNTER — TELEPHONE (OUTPATIENT)
Dept: INTERNAL MEDICINE CLINIC | Facility: CLINIC | Age: 74
End: 2019-03-01

## 2019-03-01 DIAGNOSIS — E03.9 ACQUIRED HYPOTHYROIDISM: ICD-10-CM

## 2019-03-01 RX ORDER — LEVOTHYROXINE SODIUM 0.15 MG/1
150 TABLET ORAL
Qty: 90 TABLET | Refills: 1 | Status: SHIPPED | OUTPATIENT
Start: 2019-03-01 | End: 2019-08-01

## 2019-03-02 ENCOUNTER — APPOINTMENT (OUTPATIENT)
Dept: LAB | Facility: HOSPITAL | Age: 74
End: 2019-03-02
Attending: INTERNAL MEDICINE
Payer: MEDICARE

## 2019-03-02 NOTE — TELEPHONE ENCOUNTER
Call pt: Your tests indicate that your thyroid dose is a little bit too low. This can make you feel tired and is not healthy. I need to increase it slightly. I will send a new script to the pharmacy.   Then I would like to recheck a non-fasting blood te

## 2019-03-04 ENCOUNTER — TELEPHONE (OUTPATIENT)
Dept: INTERNAL MEDICINE CLINIC | Facility: CLINIC | Age: 74
End: 2019-03-04

## 2019-03-04 RX ORDER — CIPROFLOXACIN 250 MG/1
250 TABLET, FILM COATED ORAL 2 TIMES DAILY
Qty: 14 TABLET | Refills: 0 | Status: SHIPPED | OUTPATIENT
Start: 2019-03-04 | End: 2019-03-11

## 2019-03-04 NOTE — TELEPHONE ENCOUNTER
Your urine culture showed that you had an infection. I will send an antibiotic to your pharmacy. If you do not improve in 2-4 days, please call or schedule a follow-up appointment.

## 2019-03-05 NOTE — TELEPHONE ENCOUNTER
Advised patient of Dr. Sonny Goldberg note. Patient verbalized understanding and had no further questions.

## 2019-03-08 ENCOUNTER — TELEPHONE (OUTPATIENT)
Dept: INTERNAL MEDICINE CLINIC | Facility: CLINIC | Age: 74
End: 2019-03-08

## 2019-03-08 ENCOUNTER — OFFICE VISIT (OUTPATIENT)
Dept: AUDIOLOGY | Facility: CLINIC | Age: 74
End: 2019-03-08
Payer: MEDICARE

## 2019-03-08 ENCOUNTER — OFFICE VISIT (OUTPATIENT)
Dept: OTOLARYNGOLOGY | Facility: CLINIC | Age: 74
End: 2019-03-08
Payer: MEDICARE

## 2019-03-08 VITALS
SYSTOLIC BLOOD PRESSURE: 116 MMHG | WEIGHT: 130 LBS | BODY MASS INDEX: 22.2 KG/M2 | DIASTOLIC BLOOD PRESSURE: 64 MMHG | HEIGHT: 64 IN | TEMPERATURE: 99 F

## 2019-03-08 DIAGNOSIS — H92.02 LEFT EAR PAIN: Primary | ICD-10-CM

## 2019-03-08 DIAGNOSIS — R42 DIZZINESS: Primary | ICD-10-CM

## 2019-03-08 PROCEDURE — 99213 OFFICE O/P EST LOW 20 MIN: CPT | Performed by: OTOLARYNGOLOGY

## 2019-03-08 PROCEDURE — 92557 COMPREHENSIVE HEARING TEST: CPT | Performed by: AUDIOLOGIST

## 2019-03-08 PROCEDURE — 92570 ACOUSTIC IMMITANCE TESTING: CPT | Performed by: AUDIOLOGIST

## 2019-03-08 PROCEDURE — G0463 HOSPITAL OUTPT CLINIC VISIT: HCPCS | Performed by: OTOLARYNGOLOGY

## 2019-03-08 RX ORDER — CEFDINIR 300 MG/1
300 CAPSULE ORAL 2 TIMES DAILY
Qty: 14 CAPSULE | Refills: 0 | Status: SHIPPED | OUTPATIENT
Start: 2019-03-08 | End: 2019-03-15

## 2019-03-08 NOTE — PROGRESS NOTES
Farooq Parsons is a 75year old female.  Patient presents with:  Ear Problem: Bilateral ear cleaning, pain in left ear, dizziness is getting worst   Other: pain on left side of face, pain is worst at night time    HPI:   She continues to experience very freq Hyperlipidemia    • Hyperthyroidism       Social History:  Social History    Tobacco Use      Smoking status: Former Smoker      Smokeless tobacco: Never Used    Alcohol use: Yes      Comment: ocassionally    Drug use: No       REVIEW OF SYSTEMS:   GENERAL even be contributing to her dizziness. I recommended vestibular testing to rule out a vestibular etiology for her dizziness.   We will consider further intervention at that point.  - OP REFERRAL TO AUDIOLOGY      The patient indicates understanding of thes

## 2019-03-08 NOTE — TELEPHONE ENCOUNTER
PATIENT STARTED ON CIPROFLOXACIN 250 MG TWO DAYS AGO. SHE STATES THIS MEDICATION IS MAKING HER DIZZY AND GIVING HER SWEAT AND HOT FLASHES. PATIENT STATES SHE SHE DOES NOT WANT TO TAKE THIS MEDICATION ANYMORE.

## 2019-03-08 NOTE — TELEPHONE ENCOUNTER
Pt states the Ciprofloxacin prescribed for UTI caused her to have hot flashes, cold sweats, dizziness, stomach hurting. Pt states she is not having these symptoms now as she has not taken medication today. Please advise.

## 2019-03-08 NOTE — TELEPHONE ENCOUNTER
Reviewed doctor's medication instructions with pt, pt agreed with plan of care and had no further questions at this time.

## 2019-03-08 NOTE — TELEPHONE ENCOUNTER
Please call pt. I sent a different antibiotic. She should take it for the full 7 days so the infection does not become resistant. There are no other oral medications that I can give her for this.

## 2019-03-27 ENCOUNTER — OFFICE VISIT (OUTPATIENT)
Dept: AUDIOLOGY | Facility: CLINIC | Age: 74
End: 2019-03-27
Payer: MEDICARE

## 2019-03-27 DIAGNOSIS — R42 DIZZINESS: Primary | ICD-10-CM

## 2019-03-27 PROCEDURE — 92540 BASIC VESTIBULAR EVALUATION: CPT | Performed by: AUDIOLOGIST

## 2019-03-27 PROCEDURE — 92537 CALORIC VSTBLR TEST W/REC: CPT | Performed by: AUDIOLOGIST

## 2019-03-27 NOTE — PROGRESS NOTES
Audiometrics:  VNG    Mukund Roblero  11/15/1945  [id number]    Penny Roblero was referred for testing by Mala Kerr     History:  Ms. Paloma Khan reports following all VNG instructions prior to testing today.     She is accompanied to this appointment by her significant. Positional nystagmus by itself denotes a non localizing lesion in the peripheral or central vestibular pathways.     Bithermal Caloric Test:   Left cool caloric: direction -right, 6 deg/sec  Right cool caloric: direction -left, 10 deg/sec  Lef

## 2019-05-01 RX ORDER — ATORVASTATIN CALCIUM 40 MG/1
40 TABLET, FILM COATED ORAL NIGHTLY
Qty: 90 TABLET | Refills: 1 | Status: SHIPPED | OUTPATIENT
Start: 2019-05-01 | End: 2020-12-21

## 2019-05-01 NOTE — TELEPHONE ENCOUNTER
Refill passed per [facility] protocol  Cholesterol Medications  Protocol Criteria:  · Appointment scheduled in the past 12 months or in the next 3 months  · ALT & LDL on file in the past 12 months  · ALT result < 80  · LDL result <130   Recent Outpati

## 2019-05-02 ENCOUNTER — TELEPHONE (OUTPATIENT)
Dept: OTOLARYNGOLOGY | Facility: CLINIC | Age: 74
End: 2019-05-02

## 2019-05-02 ENCOUNTER — OFFICE VISIT (OUTPATIENT)
Dept: INTERNAL MEDICINE CLINIC | Facility: CLINIC | Age: 74
End: 2019-05-02
Payer: MEDICARE

## 2019-05-02 ENCOUNTER — OFFICE VISIT (OUTPATIENT)
Dept: OTOLARYNGOLOGY | Facility: CLINIC | Age: 74
End: 2019-05-02
Payer: MEDICARE

## 2019-05-02 VITALS
WEIGHT: 130.31 LBS | DIASTOLIC BLOOD PRESSURE: 65 MMHG | SYSTOLIC BLOOD PRESSURE: 112 MMHG | BODY MASS INDEX: 22.25 KG/M2 | HEART RATE: 74 BPM | HEIGHT: 64 IN

## 2019-05-02 VITALS
TEMPERATURE: 98 F | SYSTOLIC BLOOD PRESSURE: 117 MMHG | BODY MASS INDEX: 22.2 KG/M2 | DIASTOLIC BLOOD PRESSURE: 67 MMHG | HEIGHT: 64 IN | WEIGHT: 130 LBS

## 2019-05-02 DIAGNOSIS — E03.9 ACQUIRED HYPOTHYROIDISM: ICD-10-CM

## 2019-05-02 DIAGNOSIS — M25.561 ACUTE PAIN OF RIGHT KNEE: Primary | ICD-10-CM

## 2019-05-02 DIAGNOSIS — R42 DIZZINESS: ICD-10-CM

## 2019-05-02 DIAGNOSIS — M54.50 ACUTE MIDLINE LOW BACK PAIN WITHOUT SCIATICA: ICD-10-CM

## 2019-05-02 DIAGNOSIS — R42 DIZZINESS: Primary | ICD-10-CM

## 2019-05-02 PROCEDURE — 99214 OFFICE O/P EST MOD 30 MIN: CPT | Performed by: INTERNAL MEDICINE

## 2019-05-02 PROCEDURE — G0463 HOSPITAL OUTPT CLINIC VISIT: HCPCS | Performed by: OTOLARYNGOLOGY

## 2019-05-02 PROCEDURE — 99213 OFFICE O/P EST LOW 20 MIN: CPT | Performed by: OTOLARYNGOLOGY

## 2019-05-02 PROCEDURE — G0463 HOSPITAL OUTPT CLINIC VISIT: HCPCS | Performed by: INTERNAL MEDICINE

## 2019-05-02 RX ORDER — OMEGA-3 FATTY ACIDS CAP DELAYED RELEASE 1000 MG 1000 MG
1 CAPSULE DELAYED RELEASE ORAL
COMMUNITY

## 2019-05-02 NOTE — PATIENT INSTRUCTIONS
Call Dr. Jane Vasquez (ENT)  [phone] for the results of the balance test.    Take Aleve (naproxen), 1 tab twice daily, unless it bothers your stomach.

## 2019-05-02 NOTE — PROGRESS NOTES
HPI:    Patient ID: Hyacinth Montgomery is a 75year old female. Her knee has been swollen for a few months and painful. She has been using a knee pain. She was out of town and was busy and the knee became very swollen. Pt c/o low back pain for 2 days.   It OTHER SURGICAL HISTORY      Bladder nodules removed   • OTHER SURGICAL HISTORY      Thyroid nodule removed            Current Outpatient Medications:  ATORVASTATIN 40 MG Oral Tab TAKE 1 TABLET (40 MG TOTAL) BY MOUTH NIGHTLY.  Disp: 90 tablet Rfl: 1   [facility] Sitting, Cuff Size: adult)   Pulse 74   Ht 5' 4\" (1.626 m)   Wt 130 lb 4.8 oz (59.1 kg)   BMI 22.37 kg/m²   PHYSICAL EXAM:   Physical Exam   Nursing note and vitals reviewed. Constitutional: She is oriented to person, place, and time.  She appears well-d

## 2019-05-02 NOTE — TELEPHONE ENCOUNTER
Pt at ENT  now    Pt states that she had VNG done on 3/27 and has not gotten the results yet/ Pt saw her PCP today who advised her that she had not gotten results yet either/ PCP advised PT to contact our office    Advised Pt that we would send m

## 2019-05-02 NOTE — ASSESSMENT & PLAN NOTE
The patient's thyroid medication dose was slightly low and I increased it in March. Patient will do a follow-up thyroid blood test next month.

## 2019-05-03 NOTE — PROGRESS NOTES
Annmarie Sierra is a 75year old female. Patient presents with:  Results: VNG done on 3-27-19    HPI:   She is in follow-up of EMG. The vestibular testing suggests both a possible central issue as well as a peripheral issue.   She feels as though she is doin use: Yes      Comment: ocassionally    Drug use: No       REVIEW OF SYSTEMS:   GENERAL HEALTH: feels well otherwise  GENERAL : denies fever, chills, sweats, weight loss, weight gain  SKIN: denies any unusual skin lesions or rashes  RESPIRATORY: denies shor

## 2019-05-08 ENCOUNTER — HOSPITAL ENCOUNTER (OUTPATIENT)
Dept: GENERAL RADIOLOGY | Facility: HOSPITAL | Age: 74
Discharge: HOME | End: 2019-05-08
Attending: INTERNAL MEDICINE
Payer: MEDICARE

## 2019-05-08 DIAGNOSIS — M25.561 ACUTE PAIN OF RIGHT KNEE: ICD-10-CM

## 2019-05-08 PROCEDURE — 73560 X-RAY EXAM OF KNEE 1 OR 2: CPT | Performed by: INTERNAL MEDICINE

## 2019-07-31 ENCOUNTER — NURSE TRIAGE (OUTPATIENT)
Dept: OTHER | Age: 74
End: 2019-07-31

## 2019-07-31 NOTE — TELEPHONE ENCOUNTER
Action Requested: Summary for Provider     []  Critical Lab, Recommendations Needed  [] Need Additional Advice  [x]   FYI    []   Need Orders  [] Need Medications Sent to Pharmacy  []  Other     SUMMARY: OV scheduled for tomorrow 8/1/19 at 3:40pm with

## 2019-08-01 ENCOUNTER — OFFICE VISIT (OUTPATIENT)
Dept: INTERNAL MEDICINE CLINIC | Facility: CLINIC | Age: 74
End: 2019-08-01
Payer: MEDICARE

## 2019-08-01 ENCOUNTER — LAB ENCOUNTER (OUTPATIENT)
Dept: LAB | Facility: HOSPITAL | Age: 74
End: 2019-08-01
Attending: INTERNAL MEDICINE
Payer: MEDICARE

## 2019-08-01 VITALS
DIASTOLIC BLOOD PRESSURE: 66 MMHG | WEIGHT: 128 LBS | HEART RATE: 83 BPM | BODY MASS INDEX: 21.85 KG/M2 | HEIGHT: 64 IN | SYSTOLIC BLOOD PRESSURE: 102 MMHG

## 2019-08-01 DIAGNOSIS — E03.9 ACQUIRED HYPOTHYROIDISM: ICD-10-CM

## 2019-08-01 DIAGNOSIS — I10 ESSENTIAL HYPERTENSION: ICD-10-CM

## 2019-08-01 DIAGNOSIS — M25.561 ACUTE PAIN OF RIGHT KNEE: Primary | ICD-10-CM

## 2019-08-01 PROBLEM — R35.0 URINARY FREQUENCY: Status: RESOLVED | Noted: 2019-02-28 | Resolved: 2019-08-01

## 2019-08-01 LAB
ANION GAP SERPL CALC-SCNC: 5 MMOL/L (ref 0–18)
BUN BLD-MCNC: 16 MG/DL (ref 7–18)
BUN/CREAT SERPL: 19.5 (ref 10–20)
CALCIUM BLD-MCNC: 9.5 MG/DL (ref 8.5–10.1)
CHLORIDE SERPL-SCNC: 102 MMOL/L (ref 98–112)
CO2 SERPL-SCNC: 34 MMOL/L (ref 21–32)
CREAT BLD-MCNC: 0.82 MG/DL (ref 0.55–1.02)
GLUCOSE BLD-MCNC: 83 MG/DL (ref 70–99)
OSMOLALITY SERPL CALC.SUM OF ELEC: 292 MOSM/KG (ref 275–295)
PATIENT FASTING: NO
POTASSIUM SERPL-SCNC: 3.4 MMOL/L (ref 3.5–5.1)
SODIUM SERPL-SCNC: 141 MMOL/L (ref 136–145)
T4 FREE SERPL-MCNC: 2 NG/DL (ref 0.8–1.7)
TSI SER-ACNC: 0.07 MIU/ML (ref 0.36–3.74)

## 2019-08-01 PROCEDURE — 84443 ASSAY THYROID STIM HORMONE: CPT

## 2019-08-01 PROCEDURE — 84439 ASSAY OF FREE THYROXINE: CPT

## 2019-08-01 PROCEDURE — 36415 COLL VENOUS BLD VENIPUNCTURE: CPT

## 2019-08-01 PROCEDURE — 80048 BASIC METABOLIC PNL TOTAL CA: CPT

## 2019-08-01 PROCEDURE — 99214 OFFICE O/P EST MOD 30 MIN: CPT | Performed by: INTERNAL MEDICINE

## 2019-08-01 RX ORDER — HYDROCHLOROTHIAZIDE 12.5 MG/1
CAPSULE, GELATIN COATED ORAL
Qty: 90 CAPSULE | Refills: 1 | Status: SHIPPED | OUTPATIENT
Start: 2019-08-01 | End: 2020-01-24

## 2019-08-01 RX ORDER — PANTOPRAZOLE SODIUM 40 MG/1
40 TABLET, DELAYED RELEASE ORAL
Qty: 90 TABLET | Refills: 1 | Status: CANCELLED | OUTPATIENT
Start: 2019-08-01

## 2019-08-01 RX ORDER — LEVOTHYROXINE SODIUM 0.15 MG/1
150 TABLET ORAL
Qty: 90 TABLET | Refills: 1 | Status: SHIPPED | OUTPATIENT
Start: 2019-08-01 | End: 2019-08-05

## 2019-08-01 NOTE — PROGRESS NOTES
HPI:    Patient ID: Checo Lopez is a 75year old female. Pt has been having knee pain since the winter, but it has been worse for the past few weeks. It has been swelling. She had trouble walking a few days ago.     Knee Pain    The pain is present i Zingiber officinalis, (CLOVER ROOT) 250 MG Oral Cap Take by mouth. Disp:  Rfl:    aspirin 81 MG Oral Tab EC Take 1 tablet (81 mg total) by mouth daily.  Disp: 30 tablet Rfl: 0   topiramate (TOPAMAX) 25 MG Oral Tab One po bid for two weeks and then increase injection because she is afraid of a complication. Handicap placard form filled out today given to patient. Unprioritized    Essential hypertension     Controlled. Continue present management. Labs today.          Relevant Medications    hydro

## 2019-08-01 NOTE — ASSESSMENT & PLAN NOTE
The patient has acute on chronic pain of her knee. It is already improved somewhat able to walk with a cane. She does not want to have an injection because she is afraid of a complication. Handicap placard form filled out today given to patient.

## 2019-08-04 NOTE — PROGRESS NOTES
Please call pt:  Now her thyroid level is too high. .  Unfortunately, I just sent a 90 day supply of her thyroid medication to the pharmacy.   I recommend that she take her levothyroxine as follows:   a whole tablet Monday through Saturday, and 1/2 tablet on

## 2019-08-06 ENCOUNTER — MED REC SCAN ONLY (OUTPATIENT)
Dept: INTERNAL MEDICINE CLINIC | Facility: CLINIC | Age: 74
End: 2019-08-06

## 2019-08-16 ENCOUNTER — OFFICE VISIT (OUTPATIENT)
Dept: INTERNAL MEDICINE CLINIC | Facility: CLINIC | Age: 74
End: 2019-08-16
Payer: MEDICARE

## 2019-08-16 VITALS
WEIGHT: 128 LBS | TEMPERATURE: 99 F | SYSTOLIC BLOOD PRESSURE: 109 MMHG | DIASTOLIC BLOOD PRESSURE: 58 MMHG | HEIGHT: 64 IN | BODY MASS INDEX: 21.85 KG/M2 | HEART RATE: 73 BPM

## 2019-08-16 DIAGNOSIS — R60.0 BILATERAL LEG EDEMA: Primary | ICD-10-CM

## 2019-08-16 PROCEDURE — 99213 OFFICE O/P EST LOW 20 MIN: CPT | Performed by: PHYSICIAN ASSISTANT

## 2019-08-16 NOTE — PROGRESS NOTES
HPI:    Patient ID: Annmarie Sierra is a 75year old female. HPI   Patient presents complaining of bilateral lower extremities swelling in her ankles.   States that she has not really had this in the past but yesterday she was standing and walking for long [Methocarba*    ITCHING  History:  Past Medical History:   Diagnosis Date   • Cervical vertebral fusion 10/2/2014   • Essential hypertension    • Hyperlipidemia    • Hyperthyroidism      Social History    Tobacco Use      Smoking status: Former Smoker Psychiatric: She has a normal mood and affect. Her behavior is normal. Judgment and thought content normal.              ASSESSMENT/PLAN:   1. Bilateral leg edema  Limit salt in diet to 4 Grams or less. Restrict fluid intake to 1500 ml daily.  Take diuret

## 2019-10-08 ENCOUNTER — TELEPHONE (OUTPATIENT)
Dept: INTERNAL MEDICINE CLINIC | Facility: CLINIC | Age: 74
End: 2019-10-08

## 2019-10-08 DIAGNOSIS — M79.606 PAIN OF LOWER EXTREMITY, UNSPECIFIED LATERALITY: Primary | ICD-10-CM

## 2019-10-08 NOTE — TELEPHONE ENCOUNTER
[facility] with a letter for Dr Chu Ch and asked me to give letter to the . Nadeen Angeles stated that she thought Dr Chu Ch may want to see her sooner that her Nov 19 appt. Letter was put in Dr Yudith Enriquez mail box at [facility].  Pt would like to be called

## 2019-10-09 ENCOUNTER — MED REC SCAN ONLY (OUTPATIENT)
Dept: INTERNAL MEDICINE CLINIC | Facility: CLINIC | Age: 74
End: 2019-10-09

## 2019-10-09 NOTE — TELEPHONE ENCOUNTER
I reviewed her results. I would like to do another test.  She should schedule this and then see me in November as scheduled. Ultrasound legs arterial.  Call central scheduling to schedule this.  [phone]

## 2019-10-18 ENCOUNTER — HOSPITAL ENCOUNTER (OUTPATIENT)
Dept: ULTRASOUND IMAGING | Facility: HOSPITAL | Age: 74
Discharge: HOME | End: 2019-10-18
Attending: INTERNAL MEDICINE
Payer: MEDICARE

## 2019-10-18 DIAGNOSIS — M79.606 PAIN OF LOWER EXTREMITY, UNSPECIFIED LATERALITY: ICD-10-CM

## 2019-10-18 PROCEDURE — 93923 UPR/LXTR ART STDY 3+ LVLS: CPT | Performed by: INTERNAL MEDICINE

## 2019-11-11 ENCOUNTER — TELEPHONE (OUTPATIENT)
Dept: INTERNAL MEDICINE CLINIC | Facility: CLINIC | Age: 74
End: 2019-11-11

## 2019-11-11 DIAGNOSIS — Z12.31 BREAST CANCER SCREENING BY MAMMOGRAM: Primary | ICD-10-CM

## 2019-11-11 NOTE — TELEPHONE ENCOUNTER
Pt requesting orders for annual mammogram be added to the chart. She denies any current breast issues. Please call back with confirmation once added.

## 2019-11-12 NOTE — TELEPHONE ENCOUNTER
Dr. Carol Gallegos, patient is requesting a mammogram order. Last mammogram was completed 11/15/2018. Please advise on order. Please reply to pool: EM TRIAGE SUPPORT    CONCLUSION:    BI-RADS CATEGORY:     DIAGNOSTIC CATEGORY 1--NEGATIVE ASSESSMENT.        REC

## 2019-11-14 ENCOUNTER — TELEPHONE (OUTPATIENT)
Dept: INTERNAL MEDICINE CLINIC | Facility: CLINIC | Age: 74
End: 2019-11-14

## 2019-11-14 NOTE — TELEPHONE ENCOUNTER
Received results from [address] [place] [address] [place] flow peripheral arterial disease test done by [facility] [employer]. I did not order this test.  It showed an abnormality in the left foot.   This does not correlate with the vascular ultrasound done at [facility].  I will discuss it w

## 2019-11-19 ENCOUNTER — LAB ENCOUNTER (OUTPATIENT)
Dept: LAB | Facility: HOSPITAL | Age: 74
End: 2019-11-19
Attending: INTERNAL MEDICINE
Payer: MEDICARE

## 2019-11-19 ENCOUNTER — OFFICE VISIT (OUTPATIENT)
Dept: INTERNAL MEDICINE CLINIC | Facility: CLINIC | Age: 74
End: 2019-11-19
Payer: MEDICARE

## 2019-11-19 VITALS
HEART RATE: 71 BPM | RESPIRATION RATE: 16 BRPM | HEIGHT: 64 IN | TEMPERATURE: 99 F | WEIGHT: 126.19 LBS | DIASTOLIC BLOOD PRESSURE: 76 MMHG | SYSTOLIC BLOOD PRESSURE: 108 MMHG | BODY MASS INDEX: 21.54 KG/M2

## 2019-11-19 DIAGNOSIS — E78.00 HYPERCHOLESTEROLEMIA: ICD-10-CM

## 2019-11-19 DIAGNOSIS — H40.009 PREGLAUCOMA, UNSPECIFIED LATERALITY: ICD-10-CM

## 2019-11-19 DIAGNOSIS — E03.9 ACQUIRED HYPOTHYROIDISM: ICD-10-CM

## 2019-11-19 DIAGNOSIS — M54.2 NECK PAIN ON LEFT SIDE: ICD-10-CM

## 2019-11-19 DIAGNOSIS — M79.89 LEG SWELLING: Primary | ICD-10-CM

## 2019-11-19 PROCEDURE — 90662 IIV NO PRSV INCREASED AG IM: CPT | Performed by: INTERNAL MEDICINE

## 2019-11-19 PROCEDURE — 85025 COMPLETE CBC W/AUTO DIFF WBC: CPT

## 2019-11-19 PROCEDURE — 80061 LIPID PANEL: CPT

## 2019-11-19 PROCEDURE — 80053 COMPREHEN METABOLIC PANEL: CPT

## 2019-11-19 PROCEDURE — 84443 ASSAY THYROID STIM HORMONE: CPT

## 2019-11-19 PROCEDURE — 99214 OFFICE O/P EST MOD 30 MIN: CPT | Performed by: INTERNAL MEDICINE

## 2019-11-19 PROCEDURE — 84439 ASSAY OF FREE THYROXINE: CPT

## 2019-11-19 PROCEDURE — 36415 COLL VENOUS BLD VENIPUNCTURE: CPT

## 2019-11-19 PROCEDURE — G0008 ADMIN INFLUENZA VIRUS VAC: HCPCS | Performed by: INTERNAL MEDICINE

## 2019-11-19 RX ORDER — FUROSEMIDE 20 MG/1
20 TABLET ORAL
Qty: 45 TABLET | Refills: 0 | Status: SHIPPED | OUTPATIENT
Start: 2019-11-19 | End: 2020-03-03

## 2019-11-19 RX ORDER — POTASSIUM CHLORIDE 750 MG/1
10 TABLET, EXTENDED RELEASE ORAL
Qty: 45 TABLET | Refills: 0 | Status: SHIPPED | OUTPATIENT
Start: 2019-11-19 | End: 2019-11-24

## 2019-11-19 RX ORDER — LEVOTHYROXINE SODIUM 0.15 MG/1
TABLET ORAL
Refills: 0 | COMMUNITY
Start: 2019-11-19 | End: 2020-07-19

## 2019-11-19 NOTE — PROGRESS NOTES
HPI:    Patient ID: Michelle Nguyen is a 75year old female. Pt c/o blurred vision. She would like to go back to the eye doctor. Pt c/o pain in her left jaw and upper neck. She had this in the past.  Her legs and right knee have been swelling.     Loly furosemide if legs are swollen. 45 tablet 0   • hydrochlorothiazide 12.5 MG Oral Cap TAKE 1 CAPSULE EVERY DAY 90 capsule 1   • Omega-3 Fatty Acids (FISH OIL) 1000 MG Oral Capsule Delayed Release Take 1 capsule by mouth.      • ATORVASTATIN 40 MG Oral Tab TA distress. Neurological: She is alert and oriented to person, place, and time.               ASSESSMENT/PLAN:     Problem List Items Addressed This Visit        High    Leg swelling - Primary     Patient complains of intermittent bilateral lower extremity tablet 0     Sig: Take 1 tablet (10 mEq total) by mouth Every other day PRN. With the furosemide if legs are swollen.

## 2019-11-19 NOTE — PATIENT INSTRUCTIONS
Do blood tests today. To schedule physical therapy at any of the [facility] facilities, please call [phone].

## 2019-11-20 ENCOUNTER — MED REC SCAN ONLY (OUTPATIENT)
Dept: INTERNAL MEDICINE CLINIC | Facility: CLINIC | Age: 74
End: 2019-11-20

## 2019-11-20 NOTE — ASSESSMENT & PLAN NOTE
Patient has had some visual problems. I advised her to see ophthalmology.   I have given her another referral.

## 2019-11-20 NOTE — ASSESSMENT & PLAN NOTE
Patient complains of intermittent bilateral lower extremity swelling. Her right leg and knee is worse than her left.   I have given her a prescription for furosemide and potassium to take as needed, but I advised her to not use it every day since the swell

## 2019-11-20 NOTE — ASSESSMENT & PLAN NOTE
The patient's dose of levothyroxine 150 mcg daily was slightly too high when I last checked her TSH, however she had just filled a 90-day prescription. I instructed her to take it only 6 days a week and 1/2 tablet on Sundays.   She did not understand those

## 2019-11-20 NOTE — ASSESSMENT & PLAN NOTE
Patient complains of recurrent anterior left-sided neck pain. I have given her a referral for physical therapy.

## 2019-11-21 ENCOUNTER — HOSPITAL ENCOUNTER (OUTPATIENT)
Dept: MAMMOGRAPHY | Facility: HOSPITAL | Age: 74
Discharge: HOME | End: 2019-11-21
Attending: INTERNAL MEDICINE
Payer: MEDICARE

## 2019-11-21 DIAGNOSIS — Z12.31 BREAST CANCER SCREENING BY MAMMOGRAM: ICD-10-CM

## 2019-11-21 PROCEDURE — 77063 BREAST TOMOSYNTHESIS BI: CPT | Performed by: INTERNAL MEDICINE

## 2019-11-21 PROCEDURE — 77067 SCR MAMMO BI INCL CAD: CPT | Performed by: INTERNAL MEDICINE

## 2019-11-27 ENCOUNTER — OFFICE VISIT (OUTPATIENT)
Dept: PHYSICAL THERAPY | Facility: HOSPITAL | Age: 74
End: 2019-11-27
Attending: INTERNAL MEDICINE
Payer: MEDICARE

## 2019-11-27 DIAGNOSIS — M54.2 NECK PAIN ON LEFT SIDE: ICD-10-CM

## 2019-11-27 PROCEDURE — 97110 THERAPEUTIC EXERCISES: CPT

## 2019-11-27 PROCEDURE — 97161 PT EVAL LOW COMPLEX 20 MIN: CPT

## 2019-11-27 NOTE — PROGRESS NOTES
CERVICAL SPINE EVALUATION:   Referring Physician: La Alvarez MD    Date of Onset: couple months ago Date of Service: 11/27/19   Diagnosis: Neck pain on left side (M54.2)  Insurance: [facility],   Precautions:HTN, Cervical Vertebral Fusion     [facility] is a 75year old y/o female who presents to therapy today with complaints of intermittent recurrent left sided neck pain goes up into area of jaw and behind left ear.   Pain increased with laying on left side, turning head to left and right in Flexibility:  WNL, min, mod, severe limitation   R L   Upper trap severely restricted severely restricted   Levator scap severely restricted severely restricted   Scalenes severely restricted severely restricted   Pec Major moderately restricted moderately 6. Patient to demonstrate improved FOTO self functional assessment score from initial 59/100, to 65/100 or greater to allow for maximization of functional status.      Frequency/Duration: Patient will be seen for 1-2x/week or a total of 8 visits over a 90 d

## 2019-12-02 ENCOUNTER — OFFICE VISIT (OUTPATIENT)
Dept: PHYSICAL THERAPY | Facility: HOSPITAL | Age: 74
End: 2019-12-02
Attending: INTERNAL MEDICINE
Payer: MEDICARE

## 2019-12-02 DIAGNOSIS — M54.2 NECK PAIN ON LEFT SIDE: ICD-10-CM

## 2019-12-02 PROCEDURE — 97110 THERAPEUTIC EXERCISES: CPT

## 2019-12-09 ENCOUNTER — OFFICE VISIT (OUTPATIENT)
Dept: PHYSICAL THERAPY | Facility: HOSPITAL | Age: 74
End: 2019-12-09
Attending: INTERNAL MEDICINE
Payer: MEDICARE

## 2019-12-09 DIAGNOSIS — M54.2 NECK PAIN ON LEFT SIDE: ICD-10-CM

## 2019-12-09 PROCEDURE — 97110 THERAPEUTIC EXERCISES: CPT

## 2019-12-09 NOTE — PROGRESS NOTES
Dx:  Neck pain on left side (M54.2)          Insurance (Authorized # of Visits):   Andrea Muñiz MA O           Authorizing Physician: Dr. Tommy Goltz Next MD visit: none scheduled  Fall Risk: standard           Precautions:  Cervical Vertebral Fusion    Subjecti demonstrate an independent HEP for  Cervical ROM, flexibility, postural strength to allow for self management of symptoms  5.  Patient to improved flexibility at cervical, scapulohumeral and pectoral musculature to allow for reduction in mechanical strain l

## 2019-12-11 ENCOUNTER — TELEPHONE (OUTPATIENT)
Dept: PHYSICAL THERAPY | Facility: HOSPITAL | Age: 74
End: 2019-12-11

## 2019-12-17 ENCOUNTER — OFFICE VISIT (OUTPATIENT)
Dept: PHYSICAL THERAPY | Facility: HOSPITAL | Age: 74
End: 2019-12-17
Attending: INTERNAL MEDICINE
Payer: MEDICARE

## 2019-12-17 DIAGNOSIS — M54.2 NECK PAIN ON LEFT SIDE: ICD-10-CM

## 2019-12-17 PROCEDURE — 97110 THERAPEUTIC EXERCISES: CPT

## 2019-12-17 NOTE — PROGRESS NOTES
Dx:  Neck pain on left side (M54.2)          Insurance (Authorized # of Visits):   Elias Gore MA O           Authorizing Physician: Dr. Nain Garcia MD visit: none scheduled  Fall Risk: standard           Precautions:  Cervical Vertebral Fusion    Subjecti be able to demonstrate increase in painfree cervical rotation to 45 degrees, to allow for improved safety in driving, flexion to  40 deg, lateral flexion to 20 deg  3.  Patient will be able to demonstrate reduction in muscle guarding/hypertonicity by approx

## 2019-12-20 ENCOUNTER — TELEPHONE (OUTPATIENT)
Dept: INTERNAL MEDICINE CLINIC | Facility: CLINIC | Age: 74
End: 2019-12-20

## 2019-12-20 NOTE — TELEPHONE ENCOUNTER
[facility] and spoke with Abdoulaye Cortes (pharmacy tech) and Emma Chester (pharmacist), advised that medication Potassium was changed to daily dose now(see note below) and verbalized understanding.         Viewed by Maria Luisa Louie on 11/24/2019  7:21 PM   Written by Tristen Vázquez

## 2019-12-20 NOTE — TELEPHONE ENCOUNTER
Current Outpatient Medications   Medication Sig Dispense Refill   • Potassium Chloride ER 10 MEQ Oral Tab CR Take 1 tablet (10 mEq total) by mouth daily.  90 tablet 0       Per pharmacy: please verify which directions are accurate for above medication: 1 ta

## 2019-12-23 ENCOUNTER — OFFICE VISIT (OUTPATIENT)
Dept: PHYSICAL THERAPY | Facility: HOSPITAL | Age: 74
End: 2019-12-23
Attending: INTERNAL MEDICINE
Payer: MEDICARE

## 2019-12-23 DIAGNOSIS — M54.2 NECK PAIN ON LEFT SIDE: ICD-10-CM

## 2019-12-23 PROCEDURE — 97110 THERAPEUTIC EXERCISES: CPT

## 2019-12-23 PROCEDURE — 97140 MANUAL THERAPY 1/> REGIONS: CPT

## 2019-12-23 NOTE — PROGRESS NOTES
Dx:  Neck pain on left side (M54.2)          Insurance (Authorized # of Visits):   Shahnaz Lebron MA O           Authorizing Physician: Dr. Mainor Miller    Next MD visit: none scheduled  Fall Risk: standard           Precautions:  Cervical Vertebral Fusion    Subjecti exercises. Progressed prone scapular strengthening and added weight for shoulder strengthening exercises.  Patient both UT tightness and discomfort, Patient easily tired and fatigued with scapular strengthening exercises, no c/o increased pain after treatme

## 2019-12-26 ENCOUNTER — OFFICE VISIT (OUTPATIENT)
Dept: PHYSICAL THERAPY | Facility: HOSPITAL | Age: 74
End: 2019-12-26
Attending: INTERNAL MEDICINE
Payer: MEDICARE

## 2019-12-26 DIAGNOSIS — M54.2 NECK PAIN ON LEFT SIDE: ICD-10-CM

## 2019-12-26 PROCEDURE — 97110 THERAPEUTIC EXERCISES: CPT

## 2019-12-26 NOTE — PROGRESS NOTES
Dx:  Neck pain on left side (M54.2)          Insurance (Authorized # of Visits):   Jenn Pressley MA O           Authorizing Physician: Dr. Tessy Muñoz    Next MD visit: none scheduled  Fall Risk: standard           Precautions:  Cervical Vertebral Fusion    Subjecti Goals: 8-10 visits  1. Patient will demonstrate a reduction in max pain at cervical region from  max of 8-9/10 down to 3/10 to allow for improved sleep tolerance and absence of interruption due to neck pain. -MET   2.  Patient will be able to DR JIM GARCIA

## 2019-12-31 ENCOUNTER — OFFICE VISIT (OUTPATIENT)
Dept: PHYSICAL THERAPY | Facility: HOSPITAL | Age: 74
End: 2019-12-31
Attending: INTERNAL MEDICINE
Payer: MEDICARE

## 2019-12-31 DIAGNOSIS — M54.2 NECK PAIN ON LEFT SIDE: ICD-10-CM

## 2019-12-31 PROCEDURE — 97110 THERAPEUTIC EXERCISES: CPT

## 2019-12-31 NOTE — PROGRESS NOTES
Dx:  Neck pain on left side (M54.2)          Insurance (Authorized # of Visits):   Noris Benton MA O           Authorizing Physician: Dr. Lyly Be    Next MD visit: none scheduled  Fall Risk: standard           Precautions:  Cervical Vertebral Fusion    Subjecti with RTB x 20 ea  -standing pull down with black TB x 20  -Seated scap squeeze 15 x 5 sec hold  -Supine flexion 2 x10 with 1#  -Supine chest press 2 x 10 with 1#  -Supine SA punch x15 with1#    -Reassess cervical ROM and both shoulder ROM     Manual Therap Continued to PT for cervical ROM, both shoulder and scapular strengthening and stabilization exercises to improved functional mobility. Patient has one schedule visits.      Charges: Ex x 3      Total Timed Treatment: 42 min  Total Treatment Time: 45 min

## 2020-01-03 ENCOUNTER — OFFICE VISIT (OUTPATIENT)
Dept: PHYSICAL THERAPY | Facility: HOSPITAL | Age: 75
End: 2020-01-03
Attending: INTERNAL MEDICINE
Payer: MEDICARE

## 2020-01-03 DIAGNOSIS — M54.2 NECK PAIN ON LEFT SIDE: ICD-10-CM

## 2020-01-03 PROCEDURE — 97110 THERAPEUTIC EXERCISES: CPT

## 2020-01-03 NOTE — PROGRESS NOTES
DC SUMMARY  Dx:  Neck pain on left side (M54.2)          Insurance (Authorized # of Visits):   Shahnaz Lebron MA O           Authorizing Physician: Dr. Mainor Miller    Next MD visit: none scheduled-  Fall Risk: standard           Precautions:  Cervical Vertebral Fusio ext    5- 5-    5      5   Abduction (C5)    5- 5-    5       5-   ER 5 5    5      5   IR 5 5     5    5   Biceps (C6)   5 5      5        5   Wrist ext (C6) 5 5    5         5   Triceps (C7) 5 5     5        5   Wrist flex (C7) 5 5    5      5   Digit ex retractions 5 x 5 secs  -Neck AROM for flex, ext lateral, rotation  -chest press 20x 2#  -seated scap squeezes 10x 5 secs  -supine punches 2# 20x SA  -PROM : shoulder left for all motions.   -Reviewed HEP      Manual Therapy:   STM; Both UT cross friction

## 2020-01-24 DIAGNOSIS — I10 ESSENTIAL HYPERTENSION: ICD-10-CM

## 2020-01-24 RX ORDER — HYDROCHLOROTHIAZIDE 12.5 MG/1
CAPSULE, GELATIN COATED ORAL
Qty: 90 CAPSULE | Refills: 1 | Status: SHIPPED | OUTPATIENT
Start: 2020-01-24 | End: 2020-06-02

## 2020-01-24 NOTE — TELEPHONE ENCOUNTER
Refill passed per [facility] protocol.   Hypertensive Medications  Protocol Criteria:  · Appointment scheduled in the past 6 months or in the next 3 months  · BMP or CMP in the past 12 months  · Creatinine result < 2  Recent Outpatient Visits

## 2020-02-13 ENCOUNTER — NURSE TRIAGE (OUTPATIENT)
Dept: INTERNAL MEDICINE CLINIC | Facility: CLINIC | Age: 75
End: 2020-02-13

## 2020-02-13 NOTE — TELEPHONE ENCOUNTER
Action Requested: Summary for Provider     []  Critical Lab, Recommendations Needed  [] Need Additional Advice  [x]   FYI    []   Need Orders  [] Need Medications Sent to Pharmacy  []  Other     SUMMARY: Patient calling with complaint of pain to right kurt

## 2020-02-18 ENCOUNTER — OFFICE VISIT (OUTPATIENT)
Dept: INTERNAL MEDICINE CLINIC | Facility: CLINIC | Age: 75
End: 2020-02-18
Payer: MEDICARE

## 2020-02-18 VITALS
BODY MASS INDEX: 23 KG/M2 | DIASTOLIC BLOOD PRESSURE: 74 MMHG | SYSTOLIC BLOOD PRESSURE: 133 MMHG | WEIGHT: 133.13 LBS | HEART RATE: 73 BPM

## 2020-02-18 DIAGNOSIS — Z13.6 SCREENING FOR ISCHEMIC HEART DISEASE: ICD-10-CM

## 2020-02-18 DIAGNOSIS — N64.4 BREAST PAIN: Primary | ICD-10-CM

## 2020-02-18 PROCEDURE — 99213 OFFICE O/P EST LOW 20 MIN: CPT | Performed by: INTERNAL MEDICINE

## 2020-02-18 NOTE — ASSESSMENT & PLAN NOTE
The patient would like to schedule a CT heart screening test.  She understands that this is not covered by her insurance and that she would have to pay for this. I have given her the phone number to schedule this.

## 2020-02-18 NOTE — ASSESSMENT & PLAN NOTE
I reviewed the patient's last mammogram which was normal.  Patient has recently developed breast pain. Neither she nor I feel a mass. Nonetheless, we will do a diagnostic mammogram and ultrasound.

## 2020-02-18 NOTE — PROGRESS NOTES
HPI:    Patient ID: Prince Baumann is a 75year old female. Pt c/o breast pain for 1 month. Breast Pain   This is a new problem. The current episode started 1 to 4 weeks ago. The problem occurs daily. The problem has been waxing and waning.  Nandini Keita C 500 MG Oral Tab Take 500 mg by mouth daily. • Sangita, Zingiber officinalis, (SANGITA ROOT) 250 MG Oral Cap Take by mouth. • aspirin 81 MG Oral Tab EC Take 1 tablet (81 mg total) by mouth daily.  30 tablet 0       Allergies:  Robaxin [Methocarba* this.         Relevant Orders    CT CALCIUM SCORING    Breast pain - Primary     I reviewed the patient's last mammogram which was normal.  Patient has recently developed breast pain. Neither she nor I feel a mass.   Nonetheless, we will do a diagnostic ma

## 2020-02-19 ENCOUNTER — MED REC SCAN ONLY (OUTPATIENT)
Dept: INTERNAL MEDICINE CLINIC | Facility: CLINIC | Age: 75
End: 2020-02-19

## 2020-02-20 DIAGNOSIS — M79.89 LEG SWELLING: ICD-10-CM

## 2020-02-20 RX ORDER — POTASSIUM CHLORIDE 750 MG/1
10 TABLET, EXTENDED RELEASE ORAL DAILY
Qty: 90 TABLET | Refills: 1 | Status: SHIPPED | OUTPATIENT
Start: 2020-02-20 | End: 2020-11-11

## 2020-02-21 NOTE — TELEPHONE ENCOUNTER
Review pended refill request as it does not fall under a protocol.   Requested Prescriptions     Pending Prescriptions Disp Refills   • POTASSIUM CHLORIDE ER 10 MEQ Oral Tab CR [Pharmacy Med Name: POTASSIUM CHLORIDE ER 10 MEQ Tablet Extended Release] 90 tab

## 2020-02-24 ENCOUNTER — HOSPITAL ENCOUNTER (OUTPATIENT)
Dept: ULTRASOUND IMAGING | Facility: HOSPITAL | Age: 75
Discharge: HOME | End: 2020-02-24
Attending: INTERNAL MEDICINE
Payer: MEDICARE

## 2020-02-24 ENCOUNTER — HOSPITAL ENCOUNTER (OUTPATIENT)
Dept: MAMMOGRAPHY | Facility: HOSPITAL | Age: 75
Discharge: HOME | End: 2020-02-24
Attending: INTERNAL MEDICINE
Payer: MEDICARE

## 2020-02-24 DIAGNOSIS — N64.4 BREAST PAIN: ICD-10-CM

## 2020-02-24 PROCEDURE — 77065 DX MAMMO INCL CAD UNI: CPT | Performed by: INTERNAL MEDICINE

## 2020-02-24 PROCEDURE — 76642 ULTRASOUND BREAST LIMITED: CPT | Performed by: INTERNAL MEDICINE

## 2020-02-24 PROCEDURE — 77061 BREAST TOMOSYNTHESIS UNI: CPT | Performed by: INTERNAL MEDICINE

## 2020-02-28 ENCOUNTER — HOSPITAL ENCOUNTER (OUTPATIENT)
Dept: CT IMAGING | Age: 75
Discharge: HOME | End: 2020-02-28
Attending: INTERNAL MEDICINE

## 2020-02-28 DIAGNOSIS — Z13.6 SCREENING FOR ISCHEMIC HEART DISEASE: ICD-10-CM

## 2020-02-28 NOTE — PROGRESS NOTES
Pt seen at [facility] for CTHS:  PRELIMINARY SCORE= 2.96  BP= 133/74  Cholestec labs as follows: from 11/19/19  TC= 209  HDL= 90  LDL= 107  TG= 61  GLUCOSE= 83  To be scheduled for Free PV screening.   All results and risk factors discussed with patient

## 2020-03-03 ENCOUNTER — OFFICE VISIT (OUTPATIENT)
Dept: INTERNAL MEDICINE CLINIC | Facility: CLINIC | Age: 75
End: 2020-03-03
Payer: MEDICARE

## 2020-03-03 VITALS
BODY MASS INDEX: 22.43 KG/M2 | WEIGHT: 131.38 LBS | HEIGHT: 64 IN | HEART RATE: 71 BPM | DIASTOLIC BLOOD PRESSURE: 64 MMHG | RESPIRATION RATE: 16 BRPM | TEMPERATURE: 99 F | SYSTOLIC BLOOD PRESSURE: 101 MMHG

## 2020-03-03 DIAGNOSIS — M81.0 AGE-RELATED OSTEOPOROSIS WITHOUT CURRENT PATHOLOGICAL FRACTURE: ICD-10-CM

## 2020-03-03 DIAGNOSIS — E03.9 ACQUIRED HYPOTHYROIDISM: ICD-10-CM

## 2020-03-03 DIAGNOSIS — E78.00 HYPERCHOLESTEROLEMIA: ICD-10-CM

## 2020-03-03 DIAGNOSIS — Z00.00 ENCOUNTER FOR MEDICARE ANNUAL WELLNESS EXAM: Primary | ICD-10-CM

## 2020-03-03 DIAGNOSIS — I10 ESSENTIAL HYPERTENSION: ICD-10-CM

## 2020-03-03 DIAGNOSIS — N39.41 URGE INCONTINENCE: ICD-10-CM

## 2020-03-03 DIAGNOSIS — R51.9 LEFT-SIDED FACE PAIN: ICD-10-CM

## 2020-03-03 DIAGNOSIS — I67.9 CEREBROVASCULAR DISEASE: ICD-10-CM

## 2020-03-03 DIAGNOSIS — H40.009 PREGLAUCOMA, UNSPECIFIED LATERALITY: ICD-10-CM

## 2020-03-03 PROBLEM — M25.561 ACUTE PAIN OF RIGHT KNEE: Status: RESOLVED | Noted: 2019-05-02 | Resolved: 2020-03-03

## 2020-03-03 PROBLEM — M54.50 ACUTE MIDLINE LOW BACK PAIN WITHOUT SCIATICA: Status: RESOLVED | Noted: 2019-05-02 | Resolved: 2020-03-03

## 2020-03-03 PROBLEM — M54.2 NECK PAIN ON LEFT SIDE: Status: RESOLVED | Noted: 2017-11-02 | Resolved: 2020-03-03

## 2020-03-03 PROBLEM — R42 DIZZINESS: Status: RESOLVED | Noted: 2019-02-28 | Resolved: 2020-03-03

## 2020-03-03 PROBLEM — N64.4 BREAST PAIN: Status: RESOLVED | Noted: 2020-02-18 | Resolved: 2020-03-03

## 2020-03-03 PROBLEM — M79.89 LEG SWELLING: Status: RESOLVED | Noted: 2019-11-19 | Resolved: 2020-03-03

## 2020-03-03 PROBLEM — Z13.6 SCREENING FOR ISCHEMIC HEART DISEASE: Status: RESOLVED | Noted: 2018-08-07 | Resolved: 2020-03-03

## 2020-03-03 PROCEDURE — 99397 PER PM REEVAL EST PAT 65+ YR: CPT | Performed by: INTERNAL MEDICINE

## 2020-03-03 PROCEDURE — G0439 PPPS, SUBSEQ VISIT: HCPCS | Performed by: INTERNAL MEDICINE

## 2020-03-03 PROCEDURE — 96160 PT-FOCUSED HLTH RISK ASSMT: CPT | Performed by: INTERNAL MEDICINE

## 2020-03-03 RX ORDER — PHENOL 1.4 %
AEROSOL, SPRAY (ML) MUCOUS MEMBRANE
Qty: 60 TABLET | Refills: 0 | OUTPATIENT
Start: 2020-03-03

## 2020-03-03 NOTE — PROGRESS NOTES
HPI:   Ashley Saeed is a 75year old female who presents for a MA (Medicare Advantage) Supervisit (Once per calendar year). She is taking her medication. Her face pain comes and goes. The breast pain went away.  The mammogram was normal.  Her urinary low risk.     Patient Care Team: Patient Care Team:  Carmelo Segura MD as PCP - General (Internal Medicine)  Trevin Heath MD (Family Medicine)  Joce Grossman MD (NEUROLOGY)  Tramaine Rangel, PT, DPT, OCS, Cert MDT  as Physical Therapist (Physical Acids (FISH OIL) 1000 MG Oral Capsule Delayed Release, Take 1 capsule by mouth. ATORVASTATIN 40 MG Oral Tab, TAKE 1 TABLET (40 MG TOTAL) BY MOUTH NIGHTLY. Cholecalciferol (VITAMIN D3) 400 units Oral Tab, Take by mouth.   Vitamin C 500 MG Oral Tab, Take 50 mood. The patient is not nervous/anxious.            EXAM:   /64 (BP Location: Right arm, Patient Position: Sitting, Cuff Size: adult)   Pulse 71   Temp 98.5 °F (36.9 °C) (Oral)   Resp 16   Ht 5' 4\" (1.626 m)   Wt 131 lb 6.4 oz (59.6 kg)   Breastfeed HENT:   Head: Normocephalic and atraumatic.    Right Ear: Tympanic membrane and ear canal normal.   Left Ear: Tympanic membrane and ear canal normal.   Nose: Nose normal.   Mouth/Throat: Oropharynx is clear and moist.   Eyes: Pupils are equal, round, and Items Addressed This Visit        High    Encounter for Medicare annual wellness exam - Primary     Unremarkable exam.  Labs were reviewed  Pt is up-to-date on colonoscopy. Immunizations were reviewed. Fall risk assessment was negative.    Hearing assessm chart, separate sheet to patient  [address] Internal Lab or Procedure External Lab or Procedure   Diabetes Screening      HbgA1C   Annually No results found for: A1C No flowsheet data found.     Fasting Blood Sugar (FSB Hepatitis B for Moderate/High Risk No vaccine history found Medium/high risk factors:   End-stage renal disease   Hemophiliacs who received Factor VIII or IX concentrates   Clients of institutions for the mentally retarded   Persons who live in the same

## 2020-03-03 NOTE — PATIENT INSTRUCTIONS
Cholo Roblero's SCREENING SCHEDULE   Tests on this list are recommended by your physician but may not be covered, or covered at this frequency, by your insurer. Please check with your insurance carrier before scheduling to verify coverage.    PREVENTATIVE abnormal Colonoscopy due on 03/13/2023 Update [facility] if applicable    Flex Sigmoidoscopy Screen  Covered every 5 years No results found for this or any previous visit. No flowsheet data found.      Fecal Occult Blood   Covered Annually No result in visit on 08/10/17   • PNEUMOCOCCAL VACC, 13 TYSON IM    Please get once after your 65th birthday    Pneumococcal 23 (Pneumovax)  Covered Once after 65 No orders found for this or any previous visit.  Please get once after your 65th birthday    Hepatitis B

## 2020-03-04 ENCOUNTER — LAB ENCOUNTER (OUTPATIENT)
Dept: LAB | Facility: HOSPITAL | Age: 75
End: 2020-03-04
Attending: INTERNAL MEDICINE
Payer: MEDICARE

## 2020-03-04 DIAGNOSIS — E03.9 ACQUIRED HYPOTHYROIDISM: ICD-10-CM

## 2020-03-04 DIAGNOSIS — I10 ESSENTIAL HYPERTENSION: ICD-10-CM

## 2020-03-04 LAB
ANION GAP SERPL CALC-SCNC: 3 MMOL/L (ref 0–18)
BUN BLD-MCNC: 11 MG/DL (ref 7–18)
BUN/CREAT SERPL: 12 (ref 10–20)
CALCIUM BLD-MCNC: 9.7 MG/DL (ref 8.5–10.1)
CHLORIDE SERPL-SCNC: 98 MMOL/L (ref 98–112)
CO2 SERPL-SCNC: 36 MMOL/L (ref 21–32)
CREAT BLD-MCNC: 0.92 MG/DL (ref 0.55–1.02)
GLUCOSE BLD-MCNC: 93 MG/DL (ref 70–99)
OSMOLALITY SERPL CALC.SUM OF ELEC: 283 MOSM/KG (ref 275–295)
PATIENT FASTING Y/N/NP: YES
POTASSIUM SERPL-SCNC: 3.4 MMOL/L (ref 3.5–5.1)
SODIUM SERPL-SCNC: 137 MMOL/L (ref 136–145)
T4 FREE SERPL-MCNC: 1.4 NG/DL (ref 0.8–1.7)
TSI SER-ACNC: 5.62 MIU/ML (ref 0.36–3.74)

## 2020-03-04 PROCEDURE — 80048 BASIC METABOLIC PNL TOTAL CA: CPT

## 2020-03-04 PROCEDURE — 84443 ASSAY THYROID STIM HORMONE: CPT

## 2020-03-04 PROCEDURE — 84439 ASSAY OF FREE THYROXINE: CPT

## 2020-03-04 PROCEDURE — 36415 COLL VENOUS BLD VENIPUNCTURE: CPT

## 2020-03-04 NOTE — ASSESSMENT & PLAN NOTE
Controlled.   Continue present management Labs today-we will notify you of those results    Medications refilled

## 2020-03-04 NOTE — ASSESSMENT & PLAN NOTE
Unremarkable exam.  Labs were reviewed  Pt is up-to-date on colonoscopy. Immunizations were reviewed. Fall risk assessment was negative.    Hearing assessment was normal.

## 2020-05-08 ENCOUNTER — APPOINTMENT (OUTPATIENT)
Dept: GENERAL RADIOLOGY | Facility: HOSPITAL | Age: 75
End: 2020-05-08
Attending: EMERGENCY MEDICINE
Payer: MEDICARE

## 2020-05-08 ENCOUNTER — HOSPITAL ENCOUNTER (EMERGENCY)
Facility: HOSPITAL | Age: 75
Discharge: HOME | End: 2020-05-08
Attending: EMERGENCY MEDICINE
Payer: MEDICARE

## 2020-05-08 ENCOUNTER — APPOINTMENT (OUTPATIENT)
Dept: CT IMAGING | Facility: HOSPITAL | Age: 75
End: 2020-05-08
Attending: EMERGENCY MEDICINE
Payer: MEDICARE

## 2020-05-08 ENCOUNTER — NURSE TRIAGE (OUTPATIENT)
Dept: INTERNAL MEDICINE CLINIC | Facility: CLINIC | Age: 75
End: 2020-05-08

## 2020-05-08 VITALS
WEIGHT: 130 LBS | OXYGEN SATURATION: 97 % | TEMPERATURE: 98 F | SYSTOLIC BLOOD PRESSURE: 123 MMHG | DIASTOLIC BLOOD PRESSURE: 67 MMHG | RESPIRATION RATE: 16 BRPM | BODY MASS INDEX: 22 KG/M2 | HEART RATE: 62 BPM

## 2020-05-08 DIAGNOSIS — S22.41XA CLOSED FRACTURE OF MULTIPLE RIBS OF RIGHT SIDE, INITIAL ENCOUNTER: Primary | ICD-10-CM

## 2020-05-08 PROCEDURE — 71260 CT THORAX DX C+: CPT | Performed by: EMERGENCY MEDICINE

## 2020-05-08 PROCEDURE — 80048 BASIC METABOLIC PNL TOTAL CA: CPT | Performed by: EMERGENCY MEDICINE

## 2020-05-08 PROCEDURE — 74177 CT ABD & PELVIS W/CONTRAST: CPT | Performed by: EMERGENCY MEDICINE

## 2020-05-08 PROCEDURE — 99284 EMERGENCY DEPT VISIT MOD MDM: CPT

## 2020-05-08 PROCEDURE — 96374 THER/PROPH/DIAG INJ IV PUSH: CPT

## 2020-05-08 PROCEDURE — 85025 COMPLETE CBC W/AUTO DIFF WBC: CPT | Performed by: EMERGENCY MEDICINE

## 2020-05-08 RX ORDER — MORPHINE SULFATE 4 MG/ML
4 INJECTION, SOLUTION INTRAMUSCULAR; INTRAVENOUS ONCE
Status: COMPLETED | OUTPATIENT
Start: 2020-05-08 | End: 2020-05-08

## 2020-05-08 RX ORDER — HYDROCODONE BITARTRATE AND ACETAMINOPHEN 5; 325 MG/1; MG/1
1-2 TABLET ORAL EVERY 6 HOURS PRN
Qty: 15 TABLET | Refills: 0 | Status: SHIPPED | OUTPATIENT
Start: 2020-05-08 | End: 2020-05-12

## 2020-05-08 NOTE — ED PROVIDER NOTES
Patient Seen in: [facility] Emergency Department      History   Patient presents with:  Trauma    Stated Complaint: Right sided rib pain after fall     HPI    71-year-old female with history of hypertension, hyperlipidemia, and hyperthyroidism pre Appearance: alert, no distress  Eyes: pupils equal and round no injection  Respiratory: chest is tender to palpation along the costal margin on the right, breath sounds are equal  Cardiac: regular rate and rhythm  Gastrointestinal:  soft with tenderness to refill of pain medications as needed.               Disposition and Plan     Clinical Impression:  Closed fracture of multiple ribs of right side, initial encounter  (primary encounter diagnosis)    Disposition:  Discharge  5/8/2020 12:43 pm    Follow-up:

## 2020-05-08 NOTE — ED NOTES
No change in condition, resting on cart in no apparent distress. Call light within reach. Positioned on cart for comfort, blanket provided. Awaiting ct, will continue to monitor.

## 2020-05-08 NOTE — ED INITIAL ASSESSMENT (HPI)
C/o right side/rib pain after mechanical fall in the bathtub this morning. Denies hitting head, LOC or other injury.  Ambulatory with steady gait, no BETTY

## 2020-05-08 NOTE — TELEPHONE ENCOUNTER
Action Requested: Summary for Provider     []  Critical Lab, Recommendations Needed  [] Need Additional Advice  [x]   FYI    []   Need Orders  [] Need Medications Sent to Pharmacy  []  Other     SUMMARY: Patient calling with complaint of right sided upper

## 2020-05-09 NOTE — TELEPHONE ENCOUNTER
Disposition and Plan      Clinical Impression:  Closed fracture of multiple ribs of right side, initial encounter  (primary encounter diagnosis)     Disposition:  Discharge  5/8/2020 12:43 pm     Follow-up:  MD Antoine Portillo Dr

## 2020-05-11 ENCOUNTER — TELEPHONE (OUTPATIENT)
Dept: INTERNAL MEDICINE CLINIC | Facility: CLINIC | Age: 75
End: 2020-05-11

## 2020-05-11 NOTE — TELEPHONE ENCOUNTER
Patient in ER on 05/08/20 after falling in both tub. Diagnosed with closed fracture of ribs on right side. Patient prescribed Norco 5-325. Patient calling stating that she continues to have the pain, a 10 out of 10 on right side.  She is sitting upright

## 2020-05-12 ENCOUNTER — VIRTUAL PHONE E/M (OUTPATIENT)
Dept: INTERNAL MEDICINE CLINIC | Facility: CLINIC | Age: 75
End: 2020-05-12
Payer: MEDICARE

## 2020-05-12 DIAGNOSIS — S22.41XD CLOSED FRACTURE OF MULTIPLE RIBS OF RIGHT SIDE WITH ROUTINE HEALING, SUBSEQUENT ENCOUNTER: Primary | ICD-10-CM

## 2020-05-12 DIAGNOSIS — M80.00XD AGE-RELATED OSTEOPOROSIS WITH CURRENT PATHOLOGICAL FRACTURE WITH ROUTINE HEALING, SUBSEQUENT ENCOUNTER: ICD-10-CM

## 2020-05-12 PROBLEM — S22.41XA MULTIPLE CLOSED FRACTURES OF RIBS OF RIGHT SIDE: Status: ACTIVE | Noted: 2020-05-12

## 2020-05-12 PROCEDURE — 99443 PHONE E/M BY PHYS 21-30 MIN: CPT | Performed by: INTERNAL MEDICINE

## 2020-05-12 RX ORDER — HYDROCODONE BITARTRATE AND ACETAMINOPHEN 5; 325 MG/1; MG/1
TABLET ORAL
Qty: 75 TABLET | Refills: 0 | Status: SHIPPED | OUTPATIENT
Start: 2020-05-12 | End: 2020-07-14

## 2020-05-12 NOTE — PROGRESS NOTES
Virtual/Telephone Check-In    Андрей Roblero verbally consents to a Virtual/Telephone Check-In service on 05/12/20. Patient understands and accepts financial responsibility for any deductible, co-insurance and/or co-pays associated with this service. Sunday.  (incorrect instructions)  0   • Omega-3 Fatty Acids (FISH OIL) 1000 MG Oral Capsule Delayed Release Take 1 capsule by mouth. • ATORVASTATIN 40 MG Oral Tab TAKE 1 TABLET (40 MG TOTAL) BY MOUTH NIGHTLY.  90 tablet 1   • Cholecalciferol (VITAMIN D has been taking approximately 5 tablets of Norco 5 mg daily. This is causing some constipation and I advised her to continue her stool softener and add MiraLAX. I have renewed her Norco.  I advised her to do a follow-up telephone visit in 2 weeks.

## 2020-05-12 NOTE — ASSESSMENT & PLAN NOTE
Patient has osteoporosis and upon review with the patient, she has not been taking calcium. I have asked her to purchase this and start taking it twice a day with food. Advised her that this likely contributed to the rib fractures.   She expressed underst

## 2020-05-12 NOTE — ASSESSMENT & PLAN NOTE
Reviewed ER notes, labs, and imaging reports. Patient fractured 2 ribs on the right side. She continues to have significant pain which is worse when she lays down. She has been sleeping sitting up.   She has been using the incentive spirometer and she re

## 2020-05-18 ENCOUNTER — MED REC SCAN ONLY (OUTPATIENT)
Dept: INTERNAL MEDICINE CLINIC | Facility: CLINIC | Age: 75
End: 2020-05-18

## 2020-05-26 ENCOUNTER — VIRTUAL PHONE E/M (OUTPATIENT)
Dept: INTERNAL MEDICINE CLINIC | Facility: CLINIC | Age: 75
End: 2020-05-26
Payer: MEDICARE

## 2020-05-26 DIAGNOSIS — S22.41XD CLOSED FRACTURE OF MULTIPLE RIBS OF RIGHT SIDE WITH ROUTINE HEALING, SUBSEQUENT ENCOUNTER: Primary | ICD-10-CM

## 2020-05-26 PROCEDURE — 99442 PHONE E/M BY PHYS 11-20 MIN: CPT | Performed by: INTERNAL MEDICINE

## 2020-05-26 NOTE — ASSESSMENT & PLAN NOTE
Patient is doing well. She still has pain but it is controllable with hydrocodone 1 during the day and 2 at night. We will continue this for now. Follow-up in 3 weeks.

## 2020-05-26 NOTE — PROGRESS NOTES
Virtual/Telephone Check-In    Trinaterrance Roblero verbally consents to a Virtual/Telephone Check-In service on 05/26/20. Patient understands and accepts financial responsibility for any deductible, co-insurance and/or co-pays associated with this service. Pt has been taking a whole tablet Monday through Friday, none on Saturday, and half tablet on Sunday.  (incorrect instructions)  0   • Omega-3 Fatty Acids (FISH OIL) 1000 MG Oral Capsule Delayed Release Take 1 capsule by mouth.      • ATORVASTATIN 40 MG Ora Follow-up in 3 weeks. The patient expressed understanding and agreed with the plan.     Meds This Visit:  Requested Prescriptions      No prescriptions requested or ordered in this encounter              Please note that today's visit was co

## 2020-06-01 DIAGNOSIS — I10 ESSENTIAL HYPERTENSION: ICD-10-CM

## 2020-06-02 RX ORDER — HYDROCHLOROTHIAZIDE 12.5 MG/1
CAPSULE, GELATIN COATED ORAL
Qty: 90 CAPSULE | Refills: 1 | Status: SHIPPED | OUTPATIENT
Start: 2020-06-02 | End: 2021-04-15

## 2020-06-05 ENCOUNTER — HOSPITAL ENCOUNTER (OUTPATIENT)
Dept: ULTRASOUND IMAGING | Age: 75
Discharge: HOME | End: 2020-06-05
Attending: INTERNAL MEDICINE

## 2020-06-05 DIAGNOSIS — Z13.9 ENCOUNTER FOR SCREENING: ICD-10-CM

## 2020-07-14 ENCOUNTER — OFFICE VISIT (OUTPATIENT)
Dept: INTERNAL MEDICINE CLINIC | Facility: CLINIC | Age: 75
End: 2020-07-14
Payer: MEDICARE

## 2020-07-14 VITALS
BODY MASS INDEX: 22.53 KG/M2 | WEIGHT: 132 LBS | DIASTOLIC BLOOD PRESSURE: 74 MMHG | HEART RATE: 69 BPM | HEIGHT: 64 IN | SYSTOLIC BLOOD PRESSURE: 132 MMHG | RESPIRATION RATE: 16 BRPM

## 2020-07-14 DIAGNOSIS — M54.2 ANTERIOR NECK PAIN: Primary | ICD-10-CM

## 2020-07-14 DIAGNOSIS — E03.9 ACQUIRED HYPOTHYROIDISM: ICD-10-CM

## 2020-07-14 DIAGNOSIS — H57.11 PAIN OF RIGHT EYE: ICD-10-CM

## 2020-07-14 DIAGNOSIS — I10 ESSENTIAL HYPERTENSION: ICD-10-CM

## 2020-07-14 PROBLEM — S22.41XA MULTIPLE CLOSED FRACTURES OF RIBS OF RIGHT SIDE: Status: RESOLVED | Noted: 2020-05-12 | Resolved: 2020-07-14

## 2020-07-14 PROCEDURE — 99214 OFFICE O/P EST MOD 30 MIN: CPT | Performed by: INTERNAL MEDICINE

## 2020-07-14 NOTE — PROGRESS NOTES
HPI:    Patient ID: Maria Luisa Louie is a 75year old female. Pt has pain under her left neck. Pt has pain in her right eye for the past 3 days and would like to see her eye doctor. Neck Pain    This is a new problem.  The current episode started 1 to 4 daily.     • aspirin 81 MG Oral Tab EC Take 1 tablet (81 mg total) by mouth daily. 30 tablet 0   • Omega-3 Fatty Acids (FISH OIL) 1000 MG Oral Capsule Delayed Release Take 1 capsule by mouth.          Allergies:  Robaxin [Methocarba*    ITCHING     Social H place, and time. ASSESSMENT/PLAN:     Problem List Items Addressed This Visit        High    Anterior neck pain - Primary     No abnormality was noted. Advised to continue warm compresses and call if it worsens or does not resolve.

## 2020-07-14 NOTE — ASSESSMENT & PLAN NOTE
No abnormality was noted. Advised to continue warm compresses and call if it worsens or does not resolve.

## 2020-07-14 NOTE — ASSESSMENT & PLAN NOTE
Patient's last TSH was elevated. She is historically noncompliant with her thyroid dose. Pt is due for a thyroid blood test which I have asked her to do today.

## 2020-07-16 ENCOUNTER — LAB ENCOUNTER (OUTPATIENT)
Dept: LAB | Facility: HOSPITAL | Age: 75
End: 2020-07-16
Attending: INTERNAL MEDICINE
Payer: MEDICARE

## 2020-07-16 DIAGNOSIS — E03.9 ACQUIRED HYPOTHYROIDISM: ICD-10-CM

## 2020-07-16 LAB
T4 FREE SERPL-MCNC: 1 NG/DL (ref 0.8–1.7)
TSI SER-ACNC: 4.36 MIU/ML (ref 0.36–3.74)

## 2020-07-16 PROCEDURE — 84443 ASSAY THYROID STIM HORMONE: CPT

## 2020-07-16 PROCEDURE — 36415 COLL VENOUS BLD VENIPUNCTURE: CPT

## 2020-07-16 PROCEDURE — 84439 ASSAY OF FREE THYROXINE: CPT

## 2020-07-17 ENCOUNTER — TELEPHONE (OUTPATIENT)
Dept: INTERNAL MEDICINE CLINIC | Facility: CLINIC | Age: 75
End: 2020-07-17

## 2020-07-17 DIAGNOSIS — E03.9 ACQUIRED HYPOTHYROIDISM: ICD-10-CM

## 2020-07-17 NOTE — TELEPHONE ENCOUNTER
Please ask the pt if she is taking the levothyroxine every day. If she is not, how is she taking it? Or did she stop taking it?  (I never corrected the instructions, although I may have hand-written it on the AVS.)  I also haven't refilled it in a while.

## 2020-07-18 NOTE — TELEPHONE ENCOUNTER
Spoke with patient, (  verified ) informed of Dr. Pugh Gip  message below     Pt states has  has been taking the medication as directed   \" mail order was a slow with delivery  But I only missed few days \"   Routing as Mukesh Lunsford

## 2020-07-19 RX ORDER — LEVOTHYROXINE SODIUM 0.15 MG/1
150 TABLET ORAL
Qty: 90 TABLET | Refills: 0 | Status: SHIPPED | OUTPATIENT
Start: 2020-07-19 | End: 2021-02-01

## 2020-07-19 NOTE — TELEPHONE ENCOUNTER
My question was how is she taking it? Please ask her how she is taking it. She was taking it in an odd way and I never corrected the directions on the bottle. Now I want her to take it daily.

## 2020-07-20 NOTE — TELEPHONE ENCOUNTER
Called patient. She states she takes levothyroxine 150mg \"take tab Monday thru Saturday and did NOT take on Sunday\" because she had a hard time cutting it in half and her tablet would be in pieces.  She also states she missed a few days waiting for ship

## 2020-07-30 NOTE — TELEPHONE ENCOUNTER
Patient was checking her [facility] messages and wanted to see if there were any questions pending. No questions pending.

## 2020-10-15 ENCOUNTER — OFFICE VISIT (OUTPATIENT)
Dept: INTERNAL MEDICINE CLINIC | Facility: CLINIC | Age: 75
End: 2020-10-15
Payer: MEDICARE

## 2020-10-15 VITALS
HEIGHT: 64 IN | WEIGHT: 127.13 LBS | SYSTOLIC BLOOD PRESSURE: 122 MMHG | BODY MASS INDEX: 21.71 KG/M2 | DIASTOLIC BLOOD PRESSURE: 72 MMHG | HEART RATE: 75 BPM

## 2020-10-15 DIAGNOSIS — E78.00 HYPERCHOLESTEROLEMIA: ICD-10-CM

## 2020-10-15 DIAGNOSIS — M81.0 AGE-RELATED OSTEOPOROSIS WITHOUT CURRENT PATHOLOGICAL FRACTURE: ICD-10-CM

## 2020-10-15 DIAGNOSIS — I10 ESSENTIAL HYPERTENSION: ICD-10-CM

## 2020-10-15 DIAGNOSIS — N39.41 URGE INCONTINENCE: Primary | ICD-10-CM

## 2020-10-15 DIAGNOSIS — Z12.31 BREAST CANCER SCREENING BY MAMMOGRAM: ICD-10-CM

## 2020-10-15 PROBLEM — H57.11 PAIN OF RIGHT EYE: Status: RESOLVED | Noted: 2020-07-14 | Resolved: 2020-10-15

## 2020-10-15 PROCEDURE — 3078F DIAST BP <80 MM HG: CPT | Performed by: INTERNAL MEDICINE

## 2020-10-15 PROCEDURE — 3074F SYST BP LT 130 MM HG: CPT | Performed by: INTERNAL MEDICINE

## 2020-10-15 PROCEDURE — 3008F BODY MASS INDEX DOCD: CPT | Performed by: INTERNAL MEDICINE

## 2020-10-15 PROCEDURE — 99214 OFFICE O/P EST MOD 30 MIN: CPT | Performed by: INTERNAL MEDICINE

## 2020-10-15 RX ORDER — MIRABEGRON 50 MG/1
50 TABLET, FILM COATED, EXTENDED RELEASE ORAL DAILY
Qty: 30 TABLET | Refills: 5 | Status: SHIPPED | OUTPATIENT
Start: 2020-10-15 | End: 2020-11-14

## 2020-10-15 NOTE — PROGRESS NOTES
HPI:    Patient ID: Nate Presley is a 75year old female. Pt c/o urinary frequency and incontinence. She feels like her memory isn't that good.   She will have the pharmacy call when she needs refills on her medications for her blood pressure and hayden Cholecalciferol (VITAMIN D3) 400 units Oral Tab Take by mouth. • Vitamin C 500 MG Oral Tab Take 500 mg by mouth daily. • aspirin 81 MG Oral Tab EC Take 1 tablet (81 mg total) by mouth daily.  30 tablet 0       Allergies:  Robaxin [Methocarba*    ITC months. Relevant Medications    Mirabegron ER (MYRBETRIQ) 50 MG Oral Tablet 24 Hr       Unprioritized    Essential hypertension     Controlled. Continue present management.          Relevant Orders    CBC WITH DIFFERENTIAL WITH PLATELET    COMP MET

## 2020-10-15 NOTE — ASSESSMENT & PLAN NOTE
Patient has significant urge incontinence. She was on some kind of medication in the past but she does not know what it was. It was not effective. We will try giving her Myrbetriq. Follow-up in 3 months.

## 2020-10-15 NOTE — ASSESSMENT & PLAN NOTE
Patient is taking calcium and vitamin D.   She is due for a follow-up bone density test.  I advised her to schedule this when she does her mammogram.

## 2020-10-15 NOTE — PATIENT INSTRUCTIONS
Please schedule your mammogram and bone density soon. Call [phone]. Please schedule your next appointment in April for a Medicare physical.  Do fasting labs 2-4 days before that appointment. Fast for 12 hours. Water and meds are okay.      Pl

## 2020-11-10 DIAGNOSIS — M79.89 LEG SWELLING: ICD-10-CM

## 2020-11-11 RX ORDER — POTASSIUM CHLORIDE 750 MG/1
10 TABLET, EXTENDED RELEASE ORAL DAILY
Qty: 90 TABLET | Refills: 1 | Status: SHIPPED | OUTPATIENT
Start: 2020-11-11 | End: 2021-02-01

## 2020-11-13 NOTE — TELEPHONE ENCOUNTER
Call pt. For how many days did she miss her thyroid medication? When did that occur? Was that in January? December? Can she get a copy of her bone density test?  Where was it done?
Call pt: Your tests indicate that your thyroid dose is way too low. Did you by chance run out of the medication or forget to take it? If not, I need to increase the dose. Your other tests are okay.   Your blood sugar, kidney, liver, electrolytes, and bl
Noted.  Thank you.
Patient states that she was out of town in January for four days and states that she did not have her medication for those four days. Patient states she has not missed a dose since returning home at the end of January.  Patient seems mixed up with dates in
Please note, thank you. The pt called back with information that she wanted to relay to Dr. Vasu Lawrence. She states that she had the Bone Density test 11/7/16 and Pnuemonia vaccine 12/11/12. Pneumonia Vaccine information updated in pt vaccine record.
Spoke with patient and relayed doctor message below. Patient states she had been out of town and did not have her medication with her. States she has since been taking her medication every day as directed.  Encouraged to continue to take medication as direc
Name band;

## 2020-11-24 ENCOUNTER — HOSPITAL ENCOUNTER (OUTPATIENT)
Dept: BONE DENSITY | Facility: HOSPITAL | Age: 75
Discharge: HOME | End: 2020-11-24
Attending: INTERNAL MEDICINE
Payer: MEDICARE

## 2020-11-24 ENCOUNTER — HOSPITAL ENCOUNTER (OUTPATIENT)
Dept: MAMMOGRAPHY | Facility: HOSPITAL | Age: 75
Discharge: HOME | End: 2020-11-24
Attending: INTERNAL MEDICINE
Payer: MEDICARE

## 2020-11-24 ENCOUNTER — LAB ENCOUNTER (OUTPATIENT)
Dept: LAB | Facility: HOSPITAL | Age: 75
End: 2020-11-24
Attending: INTERNAL MEDICINE
Payer: MEDICARE

## 2020-11-24 DIAGNOSIS — I10 ESSENTIAL HYPERTENSION: ICD-10-CM

## 2020-11-24 DIAGNOSIS — M81.0 AGE-RELATED OSTEOPOROSIS WITHOUT CURRENT PATHOLOGICAL FRACTURE: ICD-10-CM

## 2020-11-24 DIAGNOSIS — E78.00 HYPERCHOLESTEROLEMIA: ICD-10-CM

## 2020-11-24 DIAGNOSIS — D64.9 ANEMIA, UNSPECIFIED TYPE: ICD-10-CM

## 2020-11-24 DIAGNOSIS — Z12.31 BREAST CANCER SCREENING BY MAMMOGRAM: ICD-10-CM

## 2020-11-24 PROCEDURE — 77063 BREAST TOMOSYNTHESIS BI: CPT | Performed by: INTERNAL MEDICINE

## 2020-11-24 PROCEDURE — 85025 COMPLETE CBC W/AUTO DIFF WBC: CPT

## 2020-11-24 PROCEDURE — 80061 LIPID PANEL: CPT

## 2020-11-24 PROCEDURE — 77067 SCR MAMMO BI INCL CAD: CPT | Performed by: INTERNAL MEDICINE

## 2020-11-24 PROCEDURE — 77080 DXA BONE DENSITY AXIAL: CPT | Performed by: INTERNAL MEDICINE

## 2020-11-24 PROCEDURE — 84466 ASSAY OF TRANSFERRIN: CPT

## 2020-11-24 PROCEDURE — 84443 ASSAY THYROID STIM HORMONE: CPT

## 2020-11-24 PROCEDURE — 83540 ASSAY OF IRON: CPT

## 2020-11-24 PROCEDURE — 82728 ASSAY OF FERRITIN: CPT

## 2020-11-24 PROCEDURE — 80053 COMPREHEN METABOLIC PANEL: CPT

## 2020-11-24 PROCEDURE — 36415 COLL VENOUS BLD VENIPUNCTURE: CPT

## 2020-11-25 DIAGNOSIS — D64.9 ANEMIA, UNSPECIFIED TYPE: Primary | ICD-10-CM

## 2020-12-21 ENCOUNTER — VIRTUAL PHONE E/M (OUTPATIENT)
Dept: INTERNAL MEDICINE CLINIC | Facility: CLINIC | Age: 75
End: 2020-12-21
Payer: MEDICARE

## 2020-12-21 DIAGNOSIS — E78.00 HYPERCHOLESTEROLEMIA: ICD-10-CM

## 2020-12-21 DIAGNOSIS — M81.0 AGE-RELATED OSTEOPOROSIS WITHOUT CURRENT PATHOLOGICAL FRACTURE: Primary | ICD-10-CM

## 2020-12-21 DIAGNOSIS — M17.10 OSTEOARTHRITIS OF KNEE, UNSPECIFIED LATERALITY, UNSPECIFIED OSTEOARTHRITIS TYPE: ICD-10-CM

## 2020-12-21 PROBLEM — M17.9 OSTEOARTHRITIS OF KNEE: Status: ACTIVE | Noted: 2020-12-21

## 2020-12-21 PROCEDURE — 99443 PHONE E/M BY PHYS 21-30 MIN: CPT | Performed by: INTERNAL MEDICINE

## 2020-12-21 RX ORDER — RISEDRONATE SODIUM 35 MG/1
35 TABLET, FILM COATED ORAL
Qty: 12 TABLET | Refills: 3 | Status: SHIPPED | OUTPATIENT
Start: 2020-12-21 | End: 2021-02-01

## 2020-12-21 RX ORDER — ATORVASTATIN CALCIUM 40 MG/1
40 TABLET, FILM COATED ORAL NIGHTLY
Qty: 90 TABLET | Refills: 1 | Status: SHIPPED | OUTPATIENT
Start: 2020-12-21 | End: 2021-02-01

## 2020-12-21 NOTE — PATIENT INSTRUCTIONS
I recommend that you take calcium carbonate with vitamin D, 1 tabs twice a day with food, and have 2 servings of dairy daily. If the calcium supplements are too large to swallow, chewable calcium supplements are available.  You should also do weight bearin

## 2020-12-21 NOTE — ASSESSMENT & PLAN NOTE
Pt had a recent Dexa scan which showed worsening of her osteoporosis. Counseled regarding need for Calcium with D, 1200 mg in divided doses. Counseled regarding weight-bearing exercise. Counseled regarding benefits and risks of taking bisphosphonate.

## 2020-12-21 NOTE — PROGRESS NOTES
Virtual/Telephone Check-In    Mariola Roblero verbally consents to a Virtual/Telephone Check-In service on 12/21/20. Patient understands and accepts financial responsibility for any deductible, co-insurance and/or co-pays associated with this service. TABLET (10 MEQ TOTAL) BY MOUTH DAILY. 90 tablet 1   • Levothyroxine Sodium 150 MCG Oral Tab Take 1 tablet (150 mcg total) by mouth before breakfast. 90 tablet 0   • CRANBERRY OR Take 1 tablet by mouth daily.  Takes one tablet by mouth daily     • Multiple V Primary     Pt had a recent Dexa scan which showed worsening of her osteoporosis. Counseled regarding need for Calcium with D, 1200 mg in divided doses. Counseled regarding weight-bearing exercise.    Counseled regarding benefits and risks of taking bisph

## 2020-12-22 ENCOUNTER — TELEPHONE (OUTPATIENT)
Dept: INTERNAL MEDICINE CLINIC | Facility: CLINIC | Age: 75
End: 2020-12-22

## 2020-12-29 ENCOUNTER — TELEPHONE (OUTPATIENT)
Dept: INTERNAL MEDICINE CLINIC | Facility: CLINIC | Age: 75
End: 2020-12-29

## 2020-12-29 NOTE — TELEPHONE ENCOUNTER
Came back with Persons with Disabilities Certification for Parking Placard/License Plates forms. Please have Dr. Forbes Sites fill out 2 sheet of the form.   It is placed in her mailbox

## 2021-02-01 ENCOUNTER — OFFICE VISIT (OUTPATIENT)
Dept: INTERNAL MEDICINE CLINIC | Facility: CLINIC | Age: 76
End: 2021-02-01
Payer: MEDICARE

## 2021-02-01 VITALS
HEIGHT: 64 IN | DIASTOLIC BLOOD PRESSURE: 67 MMHG | WEIGHT: 128.38 LBS | SYSTOLIC BLOOD PRESSURE: 120 MMHG | BODY MASS INDEX: 21.92 KG/M2

## 2021-02-01 DIAGNOSIS — E78.00 HYPERCHOLESTEROLEMIA: ICD-10-CM

## 2021-02-01 DIAGNOSIS — M81.0 AGE-RELATED OSTEOPOROSIS WITHOUT CURRENT PATHOLOGICAL FRACTURE: ICD-10-CM

## 2021-02-01 DIAGNOSIS — R39.9 URINARY SYMPTOM OR SIGN: Primary | ICD-10-CM

## 2021-02-01 DIAGNOSIS — I10 ESSENTIAL HYPERTENSION: ICD-10-CM

## 2021-02-01 DIAGNOSIS — D64.9 ANEMIA, UNSPECIFIED TYPE: ICD-10-CM

## 2021-02-01 DIAGNOSIS — K59.04 CHRONIC IDIOPATHIC CONSTIPATION: ICD-10-CM

## 2021-02-01 DIAGNOSIS — E03.9 ACQUIRED HYPOTHYROIDISM: ICD-10-CM

## 2021-02-01 PROBLEM — M54.2 ANTERIOR NECK PAIN: Status: RESOLVED | Noted: 2017-11-02 | Resolved: 2021-02-01

## 2021-02-01 PROBLEM — Z00.00 ENCOUNTER FOR MEDICARE ANNUAL WELLNESS EXAM: Status: RESOLVED | Noted: 2018-08-07 | Resolved: 2021-02-01

## 2021-02-01 PROBLEM — K59.00 CONSTIPATION: Status: ACTIVE | Noted: 2021-02-01

## 2021-02-01 PROCEDURE — 3078F DIAST BP <80 MM HG: CPT | Performed by: INTERNAL MEDICINE

## 2021-02-01 PROCEDURE — 3074F SYST BP LT 130 MM HG: CPT | Performed by: INTERNAL MEDICINE

## 2021-02-01 PROCEDURE — 99214 OFFICE O/P EST MOD 30 MIN: CPT | Performed by: INTERNAL MEDICINE

## 2021-02-01 PROCEDURE — 3008F BODY MASS INDEX DOCD: CPT | Performed by: INTERNAL MEDICINE

## 2021-02-01 RX ORDER — CEPHALEXIN 250 MG/1
250 CAPSULE ORAL 2 TIMES DAILY
Qty: 14 CAPSULE | Refills: 0 | Status: SHIPPED | OUTPATIENT
Start: 2021-02-01 | End: 2021-02-08

## 2021-02-01 RX ORDER — ALENDRONATE SODIUM 70 MG/1
70 TABLET ORAL WEEKLY
Qty: 12 TABLET | Refills: 1 | Status: SHIPPED | OUTPATIENT
Start: 2021-02-01 | End: 2021-02-05

## 2021-02-01 RX ORDER — ATORVASTATIN CALCIUM 40 MG/1
40 TABLET, FILM COATED ORAL NIGHTLY
Qty: 90 TABLET | Refills: 1 | Status: SHIPPED | OUTPATIENT
Start: 2021-02-01 | End: 2021-04-28

## 2021-02-01 RX ORDER — LEVOTHYROXINE SODIUM 0.15 MG/1
150 TABLET ORAL
Qty: 90 TABLET | Refills: 1 | Status: SHIPPED | OUTPATIENT
Start: 2021-02-01 | End: 2021-04-15

## 2021-02-01 RX ORDER — POTASSIUM CHLORIDE 750 MG/1
10 TABLET, EXTENDED RELEASE ORAL DAILY
Qty: 90 TABLET | Refills: 1 | Status: SHIPPED | OUTPATIENT
Start: 2021-02-01 | End: 2021-03-21

## 2021-02-01 NOTE — PATIENT INSTRUCTIONS
• Take Miralax twice a day with 4 oz water or juice each time. • Take Colace (docusate) 100 mg twice daily. • Add fiber to your diet with All Bran cereal, beans, prunes, or figs.       I recommend that you take calcium carbonate with vitamin D, a 600 mg

## 2021-02-01 NOTE — ASSESSMENT & PLAN NOTE
The patient could not afford the Actonel. I will try giving her Fosamax and see if that is more affordable, however we need to watch for GI side effects.   I advised her to increase her calcium to twice a day and to continue vitamin D.

## 2021-02-01 NOTE — ASSESSMENT & PLAN NOTE
Patient has developed a mild anemia. We will recheck this and check iron studies. Patient denies having any bleeding symptoms.

## 2021-02-01 NOTE — ASSESSMENT & PLAN NOTE
Patient was unable to give a urine specimen today. She will come back tomorrow. I advised her not to start the antibiotics until after she gets the specimen.

## 2021-02-01 NOTE — PROGRESS NOTES
HPI:    Patient ID: Tamica Covarrubias is a 67year old female. HPI:  Pt c/o dysuria, incont, frequency and urgency for 1.5 weeks. She also c/o right flank pain. She is constipated all of the time. She takes Miralax and stool softeners.   She has been usin mouth daily. • aspirin 81 MG Oral Tab EC Take 1 tablet (81 mg total) by mouth daily.  30 tablet 0       Allergies:  Robaxin [Methocarba*    ITCHING     Social History    Tobacco Use      Smoking status: Former Smoker      Smokeless tobacco: Never Used present management. Acquired hypothyroidism     TSH was normal.  Continue present management. Relevant Medications    Levothyroxine Sodium 150 MCG Oral Tab    Hypercholesterolemia     Controlled. Continue present management.          Raj Rodriguez

## 2021-02-01 NOTE — ASSESSMENT & PLAN NOTE
I advised the patient to increase the MiraLAX to twice a day and increase the Colace to twice a day.

## 2021-02-02 ENCOUNTER — LAB ENCOUNTER (OUTPATIENT)
Dept: LAB | Facility: HOSPITAL | Age: 76
End: 2021-02-02
Attending: INTERNAL MEDICINE
Payer: MEDICARE

## 2021-02-02 DIAGNOSIS — R39.9 URINARY SYMPTOM OR SIGN: ICD-10-CM

## 2021-02-02 DIAGNOSIS — D64.9 ANEMIA, UNSPECIFIED TYPE: ICD-10-CM

## 2021-02-02 LAB
BASOPHILS # BLD AUTO: 0.04 X10(3) UL (ref 0–0.2)
BASOPHILS NFR BLD AUTO: 0.7 %
BILIRUB UR QL: NEGATIVE
COLOR UR: YELLOW
DEPRECATED HBV CORE AB SER IA-ACNC: 71.5 NG/ML
DEPRECATED RDW RBC AUTO: 45.7 FL (ref 35.1–46.3)
EOSINOPHIL # BLD AUTO: 0.09 X10(3) UL (ref 0–0.7)
EOSINOPHIL NFR BLD AUTO: 1.7 %
ERYTHROCYTE [DISTWIDTH] IN BLOOD BY AUTOMATED COUNT: 12.8 % (ref 11–15)
FOLATE SERPL-MCNC: 17.8 NG/ML (ref 8.7–?)
GLUCOSE UR-MCNC: NEGATIVE MG/DL
HCT VFR BLD AUTO: 35.3 %
HGB BLD-MCNC: 11.5 G/DL
HYALINE CASTS #/AREA URNS AUTO: 1 /LPF
IMM GRANULOCYTES # BLD AUTO: 0.01 X10(3) UL (ref 0–1)
IMM GRANULOCYTES NFR BLD: 0.2 %
IRON SATURATION: 16 %
IRON SERPL-MCNC: 58 UG/DL
KETONES UR-MCNC: NEGATIVE MG/DL
LYMPHOCYTES # BLD AUTO: 1.62 X10(3) UL (ref 1–4)
LYMPHOCYTES NFR BLD AUTO: 30.2 %
MCH RBC QN AUTO: 31.8 PG (ref 26–34)
MCHC RBC AUTO-ENTMCNC: 32.6 G/DL (ref 31–37)
MCV RBC AUTO: 97.5 FL
MONOCYTES # BLD AUTO: 0.47 X10(3) UL (ref 0.1–1)
MONOCYTES NFR BLD AUTO: 8.8 %
NEUTROPHILS # BLD AUTO: 3.13 X10 (3) UL (ref 1.5–7.7)
NEUTROPHILS # BLD AUTO: 3.13 X10(3) UL (ref 1.5–7.7)
NEUTROPHILS NFR BLD AUTO: 58.4 %
NITRITE UR QL STRIP.AUTO: NEGATIVE
PH UR: 6 [PH] (ref 5–8)
PLATELET # BLD AUTO: 227 10(3)UL (ref 150–450)
PROT UR-MCNC: NEGATIVE MG/DL
RBC # BLD AUTO: 3.62 X10(6)UL
RBC #/AREA URNS AUTO: 18 /HPF
SP GR UR STRIP: 1.02 (ref 1–1.03)
TOTAL IRON BINDING CAPACITY: 373 UG/DL (ref 240–450)
TRANSFERRIN SERPL-MCNC: 250 MG/DL (ref 200–360)
UROBILINOGEN UR STRIP-ACNC: <2
VIT B12 SERPL-MCNC: 1366 PG/ML (ref 193–986)
WBC # BLD AUTO: 5.4 X10(3) UL (ref 4–11)
WBC #/AREA URNS AUTO: 128 /HPF

## 2021-02-02 PROCEDURE — 83540 ASSAY OF IRON: CPT

## 2021-02-02 PROCEDURE — 82728 ASSAY OF FERRITIN: CPT

## 2021-02-02 PROCEDURE — 82607 VITAMIN B-12: CPT

## 2021-02-02 PROCEDURE — 84466 ASSAY OF TRANSFERRIN: CPT

## 2021-02-02 PROCEDURE — 36415 COLL VENOUS BLD VENIPUNCTURE: CPT

## 2021-02-02 PROCEDURE — 81001 URINALYSIS AUTO W/SCOPE: CPT

## 2021-02-02 PROCEDURE — 85025 COMPLETE CBC W/AUTO DIFF WBC: CPT

## 2021-02-02 PROCEDURE — 87086 URINE CULTURE/COLONY COUNT: CPT

## 2021-02-02 PROCEDURE — 87088 URINE BACTERIA CULTURE: CPT

## 2021-02-02 PROCEDURE — 82746 ASSAY OF FOLIC ACID SERUM: CPT

## 2021-02-02 PROCEDURE — 87186 SC STD MICRODIL/AGAR DIL: CPT

## 2021-02-02 PROCEDURE — 87077 CULTURE AEROBIC IDENTIFY: CPT

## 2021-02-03 ENCOUNTER — TELEPHONE (OUTPATIENT)
Dept: INTERNAL MEDICINE CLINIC | Facility: CLINIC | Age: 76
End: 2021-02-03

## 2021-02-03 NOTE — TELEPHONE ENCOUNTER
Pt called to explain test results written by Dr. Virginia Plasencia. Explained results and recommendations to Pt. Pt states she already picked up Cephalexin 2 days ago along with her other medications.   Pt verbalized understanding, and aware to recheck labs in 3 mo

## 2021-02-05 ENCOUNTER — TELEPHONE (OUTPATIENT)
Dept: INTERNAL MEDICINE CLINIC | Facility: CLINIC | Age: 76
End: 2021-02-05

## 2021-02-05 DIAGNOSIS — M81.0 AGE-RELATED OSTEOPOROSIS WITHOUT CURRENT PATHOLOGICAL FRACTURE: ICD-10-CM

## 2021-02-05 RX ORDER — ALENDRONATE SODIUM 70 MG/1
70 TABLET ORAL WEEKLY
Qty: 12 TABLET | Refills: 1 | Status: SHIPPED | OUTPATIENT
Start: 2021-02-05 | End: 2021-03-06

## 2021-02-05 NOTE — TELEPHONE ENCOUNTER
I called loco to verify if alendronate was needing a prior authorization. [facility] said they haven't received the request and they would like it to be sent over.      [facility]  #[id number] - [place], [facility]

## 2021-02-05 NOTE — TELEPHONE ENCOUNTER
Current Outpatient Medications   Medication Sig Dispense Refill   • alendronate 70 MG Oral Tab Take 1 tablet (70 mg total) by mouth once a week. With 8 oz water. Wait 30 min before eating. Do not lay down.  12 tablet 1       Plan does not cover medication

## 2021-02-08 ENCOUNTER — TELEPHONE (OUTPATIENT)
Dept: INTERNAL MEDICINE CLINIC | Facility: CLINIC | Age: 76
End: 2021-02-08

## 2021-02-08 DIAGNOSIS — H40.009 PREGLAUCOMA, UNSPECIFIED LATERALITY: Primary | ICD-10-CM

## 2021-02-08 NOTE — TELEPHONE ENCOUNTER
Referral has been pended please approve if appropriate    Managed care please assist with referral patient has an appointment on 2/9/21

## 2021-02-08 NOTE — TELEPHONE ENCOUNTER
Patient has appt with Dr. Megan Blank the eye doctor tomorrow and states she called in last week for a referral. Was calling to check status, I see nothing on file. Please advise.

## 2021-03-03 ENCOUNTER — OFFICE VISIT (OUTPATIENT)
Dept: ENDOCRINOLOGY CLINIC | Facility: CLINIC | Age: 76
End: 2021-03-03
Payer: MEDICARE

## 2021-03-03 ENCOUNTER — LAB ENCOUNTER (OUTPATIENT)
Dept: LAB | Facility: HOSPITAL | Age: 76
End: 2021-03-03
Attending: INTERNAL MEDICINE
Payer: MEDICARE

## 2021-03-03 VITALS
DIASTOLIC BLOOD PRESSURE: 70 MMHG | SYSTOLIC BLOOD PRESSURE: 119 MMHG | RESPIRATION RATE: 18 BRPM | HEART RATE: 91 BPM | WEIGHT: 126.5 LBS | HEIGHT: 64 IN | BODY MASS INDEX: 21.6 KG/M2

## 2021-03-03 DIAGNOSIS — M81.0 AGE-RELATED OSTEOPOROSIS WITHOUT CURRENT PATHOLOGICAL FRACTURE: ICD-10-CM

## 2021-03-03 DIAGNOSIS — M81.0 AGE-RELATED OSTEOPOROSIS WITHOUT CURRENT PATHOLOGICAL FRACTURE: Primary | ICD-10-CM

## 2021-03-03 LAB
ALBUMIN SERPL-MCNC: 4.1 G/DL (ref 3.4–5)
ALBUMIN/GLOB SERPL: 1 {RATIO} (ref 1–2)
ALP LIVER SERPL-CCNC: 58 U/L
ALT SERPL-CCNC: 25 U/L
ANION GAP SERPL CALC-SCNC: 3 MMOL/L (ref 0–18)
AST SERPL-CCNC: 16 U/L (ref 15–37)
BILIRUB SERPL-MCNC: 1.2 MG/DL (ref 0.1–2)
BUN BLD-MCNC: 14 MG/DL (ref 7–18)
BUN/CREAT SERPL: 17.1 (ref 10–20)
CALCIUM BLD-MCNC: 10.1 MG/DL (ref 8.5–10.1)
CHLORIDE SERPL-SCNC: 102 MMOL/L (ref 98–112)
CO2 SERPL-SCNC: 34 MMOL/L (ref 21–32)
CREAT BLD-MCNC: 0.82 MG/DL
GLOBULIN PLAS-MCNC: 4 G/DL (ref 2.8–4.4)
GLUCOSE BLD-MCNC: 90 MG/DL (ref 70–99)
M PROTEIN MFR SERPL ELPH: 8.1 G/DL (ref 6.4–8.2)
OSMOLALITY SERPL CALC.SUM OF ELEC: 288 MOSM/KG (ref 275–295)
PATIENT FASTING Y/N/NP: YES
POTASSIUM SERPL-SCNC: 3.9 MMOL/L (ref 3.5–5.1)
PTH-INTACT SERPL-MCNC: 47 PG/ML (ref 18.5–88)
SODIUM SERPL-SCNC: 139 MMOL/L (ref 136–145)

## 2021-03-03 PROCEDURE — 3074F SYST BP LT 130 MM HG: CPT | Performed by: INTERNAL MEDICINE

## 2021-03-03 PROCEDURE — 3078F DIAST BP <80 MM HG: CPT | Performed by: INTERNAL MEDICINE

## 2021-03-03 PROCEDURE — 99205 OFFICE O/P NEW HI 60 MIN: CPT | Performed by: INTERNAL MEDICINE

## 2021-03-03 PROCEDURE — 82306 VITAMIN D 25 HYDROXY: CPT

## 2021-03-03 PROCEDURE — 83970 ASSAY OF PARATHORMONE: CPT

## 2021-03-03 PROCEDURE — 80053 COMPREHEN METABOLIC PANEL: CPT

## 2021-03-03 PROCEDURE — 84080 ASSAY ALKALINE PHOSPHATASES: CPT

## 2021-03-03 PROCEDURE — 3008F BODY MASS INDEX DOCD: CPT | Performed by: INTERNAL MEDICINE

## 2021-03-03 PROCEDURE — 36415 COLL VENOUS BLD VENIPUNCTURE: CPT

## 2021-03-03 RX ORDER — CEPHALEXIN 250 MG/1
250 CAPSULE ORAL 2 TIMES DAILY
COMMUNITY
End: 2021-04-15

## 2021-03-03 NOTE — H&P
New Patient Evaluation - History and Physical    CONSULT - Reason for Visit:  Osteoporosis. Requesting Physician: Dr. Verenice Kelly. CHIEF COMPLAINT:  Patient presents with:  Consult: for osteoporosis. PCP referred.  Reports fall and 3 broken ribs 6 mon SCORE:         -1.2     PA LUMBAR SPINE (L1 - L4)               BMD:    0.901 gm/sq.  cm.            T SCORE:         -1.3       Z SCORE:         0.4                     T scores are a comparison to sex-matched patients with mean peak bone mass and are give MEDICAL HISTORY:   Past Medical History:   Diagnosis Date   • Cervical vertebral fusion 10/2/2014   • Essential hypertension    • Hyperlipidemia    • Hyperthyroidism        SURGICAL HISTORY  Past Surgical History:   Procedure Laterality Date   • BACK SURGE by mouth daily. 90 tablet 1   • CRANBERRY OR Take 1 tablet by mouth daily. Takes one tablet by mouth daily     • Multiple Vitamin (MULTI-VITAMIN DAILY OR) Take 1 tablet by mouth daily.  Take one tablet by mouth daily     • HYDROCHLOROTHIAZIDE 12.5 MG Oral C Height: 5' 4\" (1.626 m)     BMI: Body mass index is 21.71 kg/m².      CONSTITUTIONAL:  awake, alert, cooperative, no apparent distress, and appears stated age  PSYCH: normal affect  EYES:  No proptosis, no ptosis, conjunctiva normal  ENT:  Normocephalic, phosphorus, total protein, albumin, liver enzymes, alkaline phosphatase, creatinine, and electrolytes- checking again  Serum 25-hydroxyvitamin D- checking again  TSH- wnl in November  PTH for possible primary or secondary hyperparathyroidism- checking    I Specific      Vitamin D, 25-Hydroxy    I also gave her information from the [address] [place] [address] about Nutrition and weight-bearing exercises.     Prior to this encounter, I spent over 20 minutes with preparing for the visit, reviewing documents

## 2021-03-04 LAB — BONE SPECIFIC ALKALINE PHOSPHATASE: 7.9 UG/L

## 2021-03-05 ENCOUNTER — TELEPHONE (OUTPATIENT)
Dept: INTERNAL MEDICINE CLINIC | Facility: CLINIC | Age: 76
End: 2021-03-05

## 2021-03-05 DIAGNOSIS — Z23 NEED FOR VACCINATION: ICD-10-CM

## 2021-03-05 LAB — 25(OH)D3 SERPL-MCNC: 72.7 NG/ML (ref 30–100)

## 2021-03-05 NOTE — TELEPHONE ENCOUNTER
Fax from [facility] authorization has been started for       Risedronate Sodium 35 MG Oral Tab (Discontinued) 12 tablet 3 12/21/2020 2/1/2021   Sig:   Take 1 tablet (35 mg total) by mouth every 7 days.  On empty stomach with 8 oz water.  Wait 30

## 2021-03-05 NOTE — TELEPHONE ENCOUNTER
Spoke to Cayetano Fitzpatrick and no prior authorization is needed.  Patient picked up a 3 month supply 2/11/21

## 2021-03-06 ENCOUNTER — TELEPHONE (OUTPATIENT)
Dept: INTERNAL MEDICINE CLINIC | Facility: CLINIC | Age: 76
End: 2021-03-06

## 2021-03-06 NOTE — TELEPHONE ENCOUNTER
Please be sure she stops the alendronate or risedronate. She should continue all of her other medications.

## 2021-03-06 NOTE — TELEPHONE ENCOUNTER
Explained to pt that this is a new medication which is more cost-effective for her. She verbalized understanding. She states she seen the Endocrinologist and they will be starting her on the once/year shot for osteoporosis soon.

## 2021-03-06 NOTE — TELEPHONE ENCOUNTER
Patient needs a call back to discuss medication directions, she is unsure what to take.      alendronate 70 MG Oral Tab            Risedronate Sodium 35

## 2021-03-07 DIAGNOSIS — M81.0 AGE-RELATED OSTEOPOROSIS WITHOUT CURRENT PATHOLOGICAL FRACTURE: Primary | ICD-10-CM

## 2021-03-07 NOTE — TELEPHONE ENCOUNTER
Hi!  I have reviewed this patient's blood tests and they are are all within normal limits. I would like her to be scheduled for the Reclast infusion. Could you help me with this please?  We can get another DEXA scan one year from her Reclast infusion and se

## 2021-03-09 PROBLEM — M81.0 SENILE OSTEOPOROSIS: Status: ACTIVE | Noted: 2021-03-09

## 2021-03-09 RX ORDER — ZOLEDRONIC ACID 5 MG/100ML
5 INJECTION, SOLUTION INTRAVENOUS ONCE
Status: CANCELLED | OUTPATIENT
Start: 2021-03-09

## 2021-03-09 RX ORDER — ZOLEDRONIC ACID 5 MG/100ML
5 INJECTION, SOLUTION INTRAVENOUS ONCE
Qty: 100 ML | Refills: 0 | Status: SHIPPED | OUTPATIENT
Start: 2021-03-09 | End: 2021-03-09

## 2021-03-09 NOTE — TELEPHONE ENCOUNTER
Dr. Mona Lyn pended for approval -please print, sign and return to RN to be faxed to [facility] for scheduling

## 2021-03-16 ENCOUNTER — NURSE TRIAGE (OUTPATIENT)
Dept: INTERNAL MEDICINE CLINIC | Facility: CLINIC | Age: 76
End: 2021-03-16

## 2021-03-16 NOTE — TELEPHONE ENCOUNTER
Action Requested: Summary for Provider     []  Critical Lab, Recommendations Needed  [] Need Additional Advice  []   FYI    []   Need Orders  [] Need Medications Sent to Pharmacy  []  Other     SUMMARY: pt states that she has had right (rib) pain.   States

## 2021-03-17 ENCOUNTER — HOSPITAL ENCOUNTER (OUTPATIENT)
Dept: GENERAL RADIOLOGY | Facility: HOSPITAL | Age: 76
Discharge: HOME | End: 2021-03-17
Attending: NURSE PRACTITIONER
Payer: MEDICARE

## 2021-03-17 ENCOUNTER — OFFICE VISIT (OUTPATIENT)
Dept: INTERNAL MEDICINE CLINIC | Facility: CLINIC | Age: 76
End: 2021-03-17
Payer: MEDICARE

## 2021-03-17 VITALS
HEART RATE: 78 BPM | HEIGHT: 64 IN | RESPIRATION RATE: 16 BRPM | WEIGHT: 129.31 LBS | SYSTOLIC BLOOD PRESSURE: 112 MMHG | DIASTOLIC BLOOD PRESSURE: 60 MMHG | BODY MASS INDEX: 22.07 KG/M2

## 2021-03-17 DIAGNOSIS — N64.4 BREAST PAIN, RIGHT: ICD-10-CM

## 2021-03-17 DIAGNOSIS — R07.81 RIB PAIN ON RIGHT SIDE: ICD-10-CM

## 2021-03-17 DIAGNOSIS — R07.81 RIB PAIN ON RIGHT SIDE: Primary | ICD-10-CM

## 2021-03-17 PROCEDURE — 99214 OFFICE O/P EST MOD 30 MIN: CPT | Performed by: NURSE PRACTITIONER

## 2021-03-17 PROCEDURE — 3008F BODY MASS INDEX DOCD: CPT | Performed by: NURSE PRACTITIONER

## 2021-03-17 PROCEDURE — 3078F DIAST BP <80 MM HG: CPT | Performed by: NURSE PRACTITIONER

## 2021-03-17 PROCEDURE — 3074F SYST BP LT 130 MM HG: CPT | Performed by: NURSE PRACTITIONER

## 2021-03-17 PROCEDURE — 71101 X-RAY EXAM UNILAT RIBS/CHEST: CPT | Performed by: NURSE PRACTITIONER

## 2021-03-17 NOTE — ASSESSMENT & PLAN NOTE
A/P 58-year-old female who fell in the bathtub on 5/8/2020 and still having pain in her ninth and 10th ribs where she did have a fracture.   Pain is tender when you touch in this area on her side of her chest.  When she was seen in the ED she did the incent

## 2021-03-17 NOTE — PROGRESS NOTES
Breast Pain (pt c/o of right pain, willy  HPI:    Patient ID: Amber Trammell is a 67year old female. Breast Pain (pt c/o of right pain, painful to the touch, started last week )   Flank Pain (pt c/o of constant pain,)    HPI- Pain 9th and 10th rib pain.  Ri calcifications were noted.      Will refer to Dr. Brandon Douglas          Past Medical History:   Diagnosis Date   • Cervical vertebral fusion 10/2/2014   • Essential hypertension    • Hyperlipidemia    • Hyperthyroidism       Past Surgical History:   Procedure Lat trouble swallowing. Eyes: Negative for pain and visual disturbance. Respiratory: Negative for cough, chest tightness and shortness of breath. Cardiovascular: Negative for chest pain, palpitations and leg swelling.    Gastrointestinal: Negative for a Tab EC Take 1 tablet (81 mg total) by mouth daily. 30 tablet 0     Allergies:  Robaxin [Methocarba*    ITCHING   PHYSICAL EXAM:   Physical Exam  Vitals and nursing note reviewed. Constitutional:       Appearance: Normal appearance. She is well-developed. normal.   Psychiatric:         Mood and Affect: Mood normal.         Behavior: Behavior normal.         Thought Content:  Thought content normal.         Judgment: Judgment normal.       /60 (BP Location: Left arm, Patient Position: Sitting, Cuff Size Relevant Orders    XR RIBS WITH CHEST (3 VIEWS), RIGHT  (CPT=71101)             No follow-ups on file. No orders of the defined types were placed in this encounter.       Meds This Visit:  Requested Prescriptions      No prescriptions requested or

## 2021-03-17 NOTE — ASSESSMENT & PLAN NOTE
A/P patient recently saw Dr. Lundy Schilder for breast pain. A diagnostic mammogram and ultrasound were done. Patient with continued pain and feels lump above her nipple. 12 o'clock position. Patient states she has a family history of breast cancer.     FINDINGS

## 2021-03-19 ENCOUNTER — TELEPHONE (OUTPATIENT)
Dept: INTERNAL MEDICINE CLINIC | Facility: CLINIC | Age: 76
End: 2021-03-19

## 2021-03-19 DIAGNOSIS — N64.4 BREAST PAIN: Primary | ICD-10-CM

## 2021-03-19 NOTE — TELEPHONE ENCOUNTER
Katrin Chris from [facility] office called and asked if I could put a diagnostic mammogram of the right breast order in the EMR.     Germania Coleman, ANP

## 2021-03-21 RX ORDER — POTASSIUM CHLORIDE 750 MG/1
TABLET, EXTENDED RELEASE ORAL
Qty: 90 TABLET | Refills: 1 | Status: SHIPPED | OUTPATIENT
Start: 2021-03-21 | End: 2021-12-23

## 2021-03-22 ENCOUNTER — HOSPITAL ENCOUNTER (OUTPATIENT)
Dept: ULTRASOUND IMAGING | Facility: HOSPITAL | Age: 76
Discharge: HOME | End: 2021-03-22
Attending: NURSE PRACTITIONER
Payer: MEDICARE

## 2021-03-22 ENCOUNTER — HOSPITAL ENCOUNTER (OUTPATIENT)
Dept: MAMMOGRAPHY | Facility: HOSPITAL | Age: 76
Discharge: HOME | End: 2021-03-22
Attending: NURSE PRACTITIONER
Payer: MEDICARE

## 2021-03-22 ENCOUNTER — OFFICE VISIT (OUTPATIENT)
Dept: HEMATOLOGY/ONCOLOGY | Facility: HOSPITAL | Age: 76
End: 2021-03-22
Attending: INTERNAL MEDICINE
Payer: MEDICARE

## 2021-03-22 VITALS
TEMPERATURE: 98 F | RESPIRATION RATE: 16 BRPM | OXYGEN SATURATION: 100 % | SYSTOLIC BLOOD PRESSURE: 136 MMHG | DIASTOLIC BLOOD PRESSURE: 68 MMHG | HEART RATE: 72 BPM

## 2021-03-22 DIAGNOSIS — N64.4 BREAST PAIN: ICD-10-CM

## 2021-03-22 DIAGNOSIS — M81.0 SENILE OSTEOPOROSIS: Primary | ICD-10-CM

## 2021-03-22 PROCEDURE — 76642 ULTRASOUND BREAST LIMITED: CPT | Performed by: NURSE PRACTITIONER

## 2021-03-22 PROCEDURE — 77065 DX MAMMO INCL CAD UNI: CPT | Performed by: NURSE PRACTITIONER

## 2021-03-22 PROCEDURE — 77061 BREAST TOMOSYNTHESIS UNI: CPT | Performed by: NURSE PRACTITIONER

## 2021-03-22 PROCEDURE — 96365 THER/PROPH/DIAG IV INF INIT: CPT

## 2021-03-22 RX ORDER — ZOLEDRONIC ACID 5 MG/100ML
INJECTION, SOLUTION INTRAVENOUS
Status: COMPLETED
Start: 2021-03-22 | End: 2021-03-22

## 2021-03-22 RX ORDER — ZOLEDRONIC ACID 5 MG/100ML
5 INJECTION, SOLUTION INTRAVENOUS ONCE
Status: CANCELLED | OUTPATIENT
Start: 2021-03-22

## 2021-03-22 RX ORDER — ZOLEDRONIC ACID 5 MG/100ML
5 INJECTION, SOLUTION INTRAVENOUS ONCE
Status: COMPLETED | OUTPATIENT
Start: 2021-03-22 | End: 2021-03-22

## 2021-03-22 RX ADMIN — ZOLEDRONIC ACID 5 MG: 5 INJECTION, SOLUTION INTRAVENOUS at 11:23:00

## 2021-03-22 NOTE — PROGRESS NOTES
reclast ordered for osteoporosis. Had been taking fosamax. Plan of care explained, potential side effects. Written information given. All questions answered to the best of my ability. Does take calcium with vitamin D. Has dentures. CRCL 53.   PIV establishe

## 2021-03-24 ENCOUNTER — TELEPHONE (OUTPATIENT)
Dept: INTERNAL MEDICINE CLINIC | Facility: CLINIC | Age: 76
End: 2021-03-24

## 2021-03-24 NOTE — TELEPHONE ENCOUNTER
She can take all of her medications. There are no restrictions. However, she should not take alendronate (Fosamax) any more.

## 2021-03-24 NOTE — TELEPHONE ENCOUNTER
Patient received reclast infusion and was advised to check with PCP about restarting her routine medications. Med profile reviewed and accurate. Dr. Alyssa Miller please advise.

## 2021-04-15 ENCOUNTER — LAB ENCOUNTER (OUTPATIENT)
Dept: LAB | Facility: HOSPITAL | Age: 76
End: 2021-04-15
Attending: INTERNAL MEDICINE
Payer: MEDICARE

## 2021-04-15 ENCOUNTER — OFFICE VISIT (OUTPATIENT)
Dept: INTERNAL MEDICINE CLINIC | Facility: CLINIC | Age: 76
End: 2021-04-15
Payer: MEDICARE

## 2021-04-15 VITALS
DIASTOLIC BLOOD PRESSURE: 73 MMHG | SYSTOLIC BLOOD PRESSURE: 121 MMHG | HEART RATE: 76 BPM | HEIGHT: 64 IN | BODY MASS INDEX: 21.28 KG/M2 | WEIGHT: 124.63 LBS

## 2021-04-15 DIAGNOSIS — D64.9 ANEMIA, UNSPECIFIED TYPE: ICD-10-CM

## 2021-04-15 DIAGNOSIS — I67.9 CEREBROVASCULAR DISEASE: ICD-10-CM

## 2021-04-15 DIAGNOSIS — I10 ESSENTIAL HYPERTENSION: ICD-10-CM

## 2021-04-15 DIAGNOSIS — H40.009 PREGLAUCOMA, UNSPECIFIED LATERALITY: ICD-10-CM

## 2021-04-15 DIAGNOSIS — Z11.1 SCREENING-PULMONARY TB: ICD-10-CM

## 2021-04-15 DIAGNOSIS — E03.9 ACQUIRED HYPOTHYROIDISM: ICD-10-CM

## 2021-04-15 DIAGNOSIS — M81.0 SENILE OSTEOPOROSIS: ICD-10-CM

## 2021-04-15 DIAGNOSIS — E78.00 HYPERCHOLESTEROLEMIA: ICD-10-CM

## 2021-04-15 DIAGNOSIS — M19.90 OSTEOARTHRITIS, UNSPECIFIED OSTEOARTHRITIS TYPE, UNSPECIFIED SITE: ICD-10-CM

## 2021-04-15 DIAGNOSIS — Z00.00 ENCOUNTER FOR MEDICARE ANNUAL WELLNESS EXAM: Primary | ICD-10-CM

## 2021-04-15 DIAGNOSIS — K59.04 CHRONIC IDIOPATHIC CONSTIPATION: ICD-10-CM

## 2021-04-15 PROBLEM — R39.9 URINARY SYMPTOM OR SIGN: Status: RESOLVED | Noted: 2021-02-01 | Resolved: 2021-04-15

## 2021-04-15 PROBLEM — N39.41 URGE INCONTINENCE: Status: RESOLVED | Noted: 2020-03-03 | Resolved: 2021-04-15

## 2021-04-15 PROBLEM — M17.10 OSTEOARTHRITIS OF KNEE: Status: RESOLVED | Noted: 2020-12-21 | Resolved: 2021-04-15

## 2021-04-15 PROBLEM — R07.81 RIB PAIN ON RIGHT SIDE: Status: RESOLVED | Noted: 2021-03-17 | Resolved: 2021-04-15

## 2021-04-15 PROBLEM — R51.9 LEFT-SIDED FACE PAIN: Status: RESOLVED | Noted: 2020-03-03 | Resolved: 2021-04-15

## 2021-04-15 PROBLEM — N64.4 BREAST PAIN, RIGHT: Status: RESOLVED | Noted: 2020-02-18 | Resolved: 2021-04-15

## 2021-04-15 PROBLEM — M17.9 OSTEOARTHRITIS OF KNEE: Status: RESOLVED | Noted: 2020-12-21 | Resolved: 2021-04-15

## 2021-04-15 PROCEDURE — 36415 COLL VENOUS BLD VENIPUNCTURE: CPT

## 2021-04-15 PROCEDURE — 84439 ASSAY OF FREE THYROXINE: CPT

## 2021-04-15 PROCEDURE — 85025 COMPLETE CBC W/AUTO DIFF WBC: CPT

## 2021-04-15 PROCEDURE — 3078F DIAST BP <80 MM HG: CPT | Performed by: INTERNAL MEDICINE

## 2021-04-15 PROCEDURE — G0439 PPPS, SUBSEQ VISIT: HCPCS | Performed by: INTERNAL MEDICINE

## 2021-04-15 PROCEDURE — 99397 PER PM REEVAL EST PAT 65+ YR: CPT | Performed by: INTERNAL MEDICINE

## 2021-04-15 PROCEDURE — 86580 TB INTRADERMAL TEST: CPT | Performed by: INTERNAL MEDICINE

## 2021-04-15 PROCEDURE — 3074F SYST BP LT 130 MM HG: CPT | Performed by: INTERNAL MEDICINE

## 2021-04-15 PROCEDURE — 84443 ASSAY THYROID STIM HORMONE: CPT

## 2021-04-15 PROCEDURE — 84481 FREE ASSAY (FT-3): CPT

## 2021-04-15 PROCEDURE — 96160 PT-FOCUSED HLTH RISK ASSMT: CPT | Performed by: INTERNAL MEDICINE

## 2021-04-15 PROCEDURE — 3008F BODY MASS INDEX DOCD: CPT | Performed by: INTERNAL MEDICINE

## 2021-04-15 RX ORDER — HYDROCHLOROTHIAZIDE 12.5 MG/1
12.5 CAPSULE, GELATIN COATED ORAL DAILY
Qty: 90 CAPSULE | Refills: 1 | Status: SHIPPED | OUTPATIENT
Start: 2021-04-15 | End: 2021-12-23

## 2021-04-15 RX ORDER — ALENDRONATE SODIUM 70 MG/1
TABLET ORAL
COMMUNITY
Start: 2021-04-09 | End: 2021-04-15

## 2021-04-15 RX ORDER — LEVOTHYROXINE SODIUM 0.15 MG/1
150 TABLET ORAL
Qty: 90 TABLET | Refills: 1 | Status: SHIPPED | OUTPATIENT
Start: 2021-04-15 | End: 2021-11-28

## 2021-04-15 NOTE — ASSESSMENT & PLAN NOTE
Unremarkable exam.  Labs were reviewed  Immunizations were reviewed. Fall risk assessment was negative.   Hearing assessment was normal.

## 2021-04-15 NOTE — ASSESSMENT & PLAN NOTE
Patient received another refill of the alendronate from her mail order pharmacy. I advised her not to take it. She has received Reclast IV and will follow up with endocrinology regarding this.

## 2021-04-15 NOTE — ASSESSMENT & PLAN NOTE
Patient has a mild anemia but is not iron deficient. She is Hemoccult negative today. We will continue to monitor this.

## 2021-04-15 NOTE — PROGRESS NOTES
HPI:   Annmarie Sierra is a 67year old female who presents for a MA (Medicare Advantage) Supervisit (Once per calendar year). HPI:  She has a form for childcare. She wants to do respite care. She still has intermittent right rib pain when she walks. Smokeless tobacco: Never Used         CAGE screening score of 0 on 4/15/2021, showing low risk of alcohol abuse.         Patient Care Team: Patient Care Team:  Megha Nugent MD as PCP - General (Internal Medicine)  Winsome Etienne MD (Family Medicine)  Eugenia Gonsalez nightly. CRANBERRY OR, Take 1 tablet by mouth daily. Takes one tablet by mouth daily  Multiple Vitamin (MULTI-VITAMIN DAILY OR), Take 1 tablet by mouth daily. Take one tablet by mouth daily  Calcium Carbonate 600 MG Oral Tab, 1 tab twice a day with food. Allergic/Immunologic: Negative for environmental allergies and food allergies. Neurological: Negative for headaches. Psychiatric/Behavioral: Negative for dysphoric mood. The patient is not nervous/anxious.            EXAM:   /73 (BP Location: Dary Goode Acuity: 20/50   Left Eye Visual Acuity: Uncorrected Left Eye Chart Acuity: 20/50   Both Eyes Visual Acuity: Uncorrected Both Eyes Chart Acuity: 20/40   Able To Tolerate Visual Acuity: Yes      Physical Exam  Vitals and nursing note reviewed.    Constitution Neurological:      Mental Status: She is alert and oriented to person, place, and time. Cranial Nerves: No cranial nerve deficit. Deep Tendon Reflexes: Reflexes are normal and symmetric.    Psychiatric:         Behavior: Behavior normal. hypercholesterolemia and her hypertension. Preglaucoma     Stable. Follow-up with ophthalmology. Osteoarthritis     Stable. Continue present management.          Chronic idiopathic constipation     Controlled with over-the-counter medicati Disease Screening     LDL Annually LDL Cholesterol (mg/dL)   Date Value   11/24/2020 81        EKG - w/ Initial Preventative Physical Exam only, or if medically necessary Electrocardiogram date     Colorectal Cancer Screening      Colonoscopy Screen every Homosexual men   Illicit injectable drug abusers     Tetanus Toxoid  Only covered with a cut with metal- TD and TDaP Not covered by Medicare Part B) No vaccine history found This may be covered with your prescription benefits, but Medicare does not cover

## 2021-04-15 NOTE — PATIENT INSTRUCTIONS
Duran Roblero's SCREENING SCHEDULE   Tests on this list are recommended by your physician but may not be covered, or covered at this frequency, by your insurer. Please check with your insurance carrier before scheduling to verify coverage.    PREVENTATIVE There are no preventive care reminders to display for this patient. Update Health Maintenance if applicable    Flex Sigmoidoscopy Screen  Covered every 5 years No results found for this or any previous visit. No flowsheet data found.      Fecal Occult Blood 64 Orders placed or performed in visit on 08/10/17   • PNEUMOCOCCAL VACC, 13 TYSON IM    Please get once after your 65th birthday    Pneumococcal 23 (Pneumovax)  Covered Once after 65 No orders found for this or any previous visit.  Please get once after your pharmacy ([facility])

## 2021-04-15 NOTE — ASSESSMENT & PLAN NOTE
Patient has microvascular changes on the MRI of her brain. She is asymptomatic. We will continue to control her cardiovascular risk factors including her hypercholesterolemia and her hypertension.

## 2021-04-17 ENCOUNTER — NURSE ONLY (OUTPATIENT)
Dept: INTERNAL MEDICINE CLINIC | Facility: CLINIC | Age: 76
End: 2021-04-17
Payer: MEDICARE

## 2021-04-17 DIAGNOSIS — Z00.00 PREVENTATIVE HEALTH CARE: Primary | ICD-10-CM

## 2021-04-17 NOTE — PROGRESS NOTES
Patient came in for TB read. Pt was administered TB placement on 4/15/21 on right forearm.  Pt induration today was 0.0

## 2021-04-28 DIAGNOSIS — E78.00 HYPERCHOLESTEROLEMIA: ICD-10-CM

## 2021-04-28 RX ORDER — ATORVASTATIN CALCIUM 40 MG/1
40 TABLET, FILM COATED ORAL NIGHTLY
Qty: 90 TABLET | Refills: 1 | Status: SHIPPED | OUTPATIENT
Start: 2021-04-28 | End: 2021-12-07

## 2021-05-05 ENCOUNTER — OFFICE VISIT (OUTPATIENT)
Dept: SURGERY | Facility: CLINIC | Age: 76
End: 2021-05-05
Payer: MEDICARE

## 2021-05-05 VITALS
HEART RATE: 88 BPM | DIASTOLIC BLOOD PRESSURE: 69 MMHG | SYSTOLIC BLOOD PRESSURE: 128 MMHG | HEIGHT: 64 IN | BODY MASS INDEX: 21.34 KG/M2 | WEIGHT: 125 LBS | RESPIRATION RATE: 16 BRPM

## 2021-05-05 DIAGNOSIS — N64.4 BREAST PAIN, RIGHT: Primary | ICD-10-CM

## 2021-05-05 PROCEDURE — 3074F SYST BP LT 130 MM HG: CPT | Performed by: SURGERY

## 2021-05-05 PROCEDURE — 3008F BODY MASS INDEX DOCD: CPT | Performed by: SURGERY

## 2021-05-05 PROCEDURE — 99204 OFFICE O/P NEW MOD 45 MIN: CPT | Performed by: SURGERY

## 2021-05-05 PROCEDURE — 3078F DIAST BP <80 MM HG: CPT | Performed by: SURGERY

## 2021-05-06 NOTE — PROGRESS NOTES
Breast Surgery New Patient Consultation    This is the first visit for this 67year old woman, referred by Dr. Domi Kaur, who presents for evaluation of right breast pain.     History of Present Illness:   Ms. Barbara Hayden is a 67year old woman who presents NIGHTLY., Disp: 90 tablet, Rfl: 1  hydrochlorothiazide 12.5 MG Oral Cap, Take 1 capsule (12.5 mg total) by mouth daily. , Disp: 90 capsule, Rfl: 1  Levothyroxine Sodium 150 MCG Oral Tab, Take 1 tablet (150 mcg total) by mouth before breakfast., Disp: 90 tab pain or palpitations    GI: Positive diarrhea, no vomiting difficulty in swallowing, no reflux systems or abdominal pain next    Genitourinary: Positive urinary frequency, positive waking up night to urinate and positive urinary incontinence    Skin: Posit masses in the breast. The axillary tail is normal.    Abdomen: The abdomen is soft, flat and non tender. The liver is not enlarged. There are no palpable masses. Lymph Nodes:   The supraclavicular, axillary and cervical regions are free of significant l

## 2021-05-14 ENCOUNTER — TELEPHONE (OUTPATIENT)
Dept: INTERNAL MEDICINE CLINIC | Facility: CLINIC | Age: 76
End: 2021-05-14

## 2021-05-14 ENCOUNTER — NURSE TRIAGE (OUTPATIENT)
Dept: INTERNAL MEDICINE CLINIC | Facility: CLINIC | Age: 76
End: 2021-05-14

## 2021-05-14 NOTE — TELEPHONE ENCOUNTER
Pt states she gave doctor forms to fill out for her when she was seen in the office on 4/15/21. Nothing about forms noted in EMR.

## 2021-05-14 NOTE — TELEPHONE ENCOUNTER
Action Requested: Summary for Provider     []  Critical Lab, Recommendations Needed  [] Need Additional Advice  []   FYI    []   Need Orders  [] Need Medications Sent to Pharmacy  []  Other     SUMMARY: pt asking to schedule appt with Dr. Alyssa Miller only at t

## 2021-05-14 NOTE — TELEPHONE ENCOUNTER
I filled out the form. I remember doing it and I also made a notation right above the diet assessment in my note. I do not see that the form has been scanned, but I remember giving the form to her. She should look in her papers.   I always put the Tb isidra

## 2021-05-15 NOTE — TELEPHONE ENCOUNTER
Spoke with patient and relayed Dr message. Patient stated she will look around and see if she can find the forms. If she doesn't I told her to bring us another copy to the office. Patient verbalized understanding.

## 2021-05-18 ENCOUNTER — OFFICE VISIT (OUTPATIENT)
Dept: INTERNAL MEDICINE CLINIC | Facility: CLINIC | Age: 76
End: 2021-05-18
Payer: MEDICARE

## 2021-05-18 VITALS
DIASTOLIC BLOOD PRESSURE: 64 MMHG | RESPIRATION RATE: 16 BRPM | HEART RATE: 78 BPM | BODY MASS INDEX: 21.11 KG/M2 | HEIGHT: 64 IN | WEIGHT: 123.69 LBS | SYSTOLIC BLOOD PRESSURE: 111 MMHG

## 2021-05-18 DIAGNOSIS — D64.9 ANEMIA, UNSPECIFIED TYPE: ICD-10-CM

## 2021-05-18 DIAGNOSIS — S20.211D CONTUSION OF RIB ON RIGHT SIDE, SUBSEQUENT ENCOUNTER: Primary | ICD-10-CM

## 2021-05-18 PROBLEM — S20.211A CONTUSION OF RIB ON RIGHT SIDE: Status: ACTIVE | Noted: 2021-05-18

## 2021-05-18 PROCEDURE — 3074F SYST BP LT 130 MM HG: CPT | Performed by: INTERNAL MEDICINE

## 2021-05-18 PROCEDURE — 99213 OFFICE O/P EST LOW 20 MIN: CPT | Performed by: INTERNAL MEDICINE

## 2021-05-18 PROCEDURE — 3008F BODY MASS INDEX DOCD: CPT | Performed by: INTERNAL MEDICINE

## 2021-05-18 PROCEDURE — 3078F DIAST BP <80 MM HG: CPT | Performed by: INTERNAL MEDICINE

## 2021-05-18 NOTE — ASSESSMENT & PLAN NOTE
Reviewed rib x-rays which were negative for fracture. Advised patient that rib contusions can take several months to heal.  Follow-up in 6 weeks.

## 2021-05-18 NOTE — PROGRESS NOTES
HPI:    Patient ID: Gus Dinero is a 67year old female. HPI:  Pt c/o right sided posterior rib pain for 2 months. It started after she fell in the bathtub. It hurts when she walks. No pain with deep breathing or coughing.   She looked at her vitami Alcohol use: Yes      Comment: ocassionally    Drug use: No    Family History   Problem Relation Age of Onset   • Cancer Father    • Heart Disorder Mother         Stroke   • Cancer Brother    • Breast Cancer Maternal Aunt 61   • Breast Cancer Maternal Cous

## 2021-07-01 ENCOUNTER — LAB ENCOUNTER (OUTPATIENT)
Dept: LAB | Facility: HOSPITAL | Age: 76
End: 2021-07-01
Attending: INTERNAL MEDICINE
Payer: MEDICARE

## 2021-07-01 ENCOUNTER — OFFICE VISIT (OUTPATIENT)
Dept: INTERNAL MEDICINE CLINIC | Facility: CLINIC | Age: 76
End: 2021-07-01
Payer: MEDICARE

## 2021-07-01 VITALS
DIASTOLIC BLOOD PRESSURE: 73 MMHG | HEART RATE: 79 BPM | WEIGHT: 124.63 LBS | BODY MASS INDEX: 21.28 KG/M2 | SYSTOLIC BLOOD PRESSURE: 124 MMHG | HEIGHT: 64 IN

## 2021-07-01 DIAGNOSIS — E03.9 ACQUIRED HYPOTHYROIDISM: ICD-10-CM

## 2021-07-01 DIAGNOSIS — E78.00 HYPERCHOLESTEROLEMIA: ICD-10-CM

## 2021-07-01 DIAGNOSIS — D22.9 CHANGE IN MULTIPLE NEVI: ICD-10-CM

## 2021-07-01 DIAGNOSIS — D64.9 ANEMIA, UNSPECIFIED TYPE: ICD-10-CM

## 2021-07-01 DIAGNOSIS — D64.9 ANEMIA, UNSPECIFIED TYPE: Primary | ICD-10-CM

## 2021-07-01 LAB
BASOPHILS # BLD AUTO: 0.03 X10(3) UL (ref 0–0.2)
BASOPHILS NFR BLD AUTO: 0.6 %
CHOLEST SMN-MCNC: 190 MG/DL (ref ?–200)
DEPRECATED HBV CORE AB SER IA-ACNC: 74.8 NG/ML
DEPRECATED RDW RBC AUTO: 45.4 FL (ref 35.1–46.3)
EOSINOPHIL # BLD AUTO: 0.08 X10(3) UL (ref 0–0.7)
EOSINOPHIL NFR BLD AUTO: 1.5 %
ERYTHROCYTE [DISTWIDTH] IN BLOOD BY AUTOMATED COUNT: 12.8 % (ref 11–15)
HCT VFR BLD AUTO: 37.6 %
HDLC SERPL-MCNC: 77 MG/DL (ref 40–59)
HGB BLD-MCNC: 11.8 G/DL
IMM GRANULOCYTES # BLD AUTO: 0.01 X10(3) UL (ref 0–1)
IMM GRANULOCYTES NFR BLD: 0.2 %
IRON SATURATION: 19 %
IRON SERPL-MCNC: 70 UG/DL
LDLC SERPL CALC-MCNC: 103 MG/DL (ref ?–100)
LYMPHOCYTES # BLD AUTO: 1.63 X10(3) UL (ref 1–4)
LYMPHOCYTES NFR BLD AUTO: 31.2 %
MCH RBC QN AUTO: 30.4 PG (ref 26–34)
MCHC RBC AUTO-ENTMCNC: 31.4 G/DL (ref 31–37)
MCV RBC AUTO: 96.9 FL
MONOCYTES # BLD AUTO: 0.52 X10(3) UL (ref 0.1–1)
MONOCYTES NFR BLD AUTO: 10 %
NEUTROPHILS # BLD AUTO: 2.95 X10 (3) UL (ref 1.5–7.7)
NEUTROPHILS # BLD AUTO: 2.95 X10(3) UL (ref 1.5–7.7)
NEUTROPHILS NFR BLD AUTO: 56.5 %
NONHDLC SERPL-MCNC: 113 MG/DL (ref ?–130)
PATIENT FASTING Y/N/NP: YES
PLATELET # BLD AUTO: 245 10(3)UL (ref 150–450)
RBC # BLD AUTO: 3.88 X10(6)UL
T4 FREE SERPL-MCNC: 1.8 NG/DL (ref 0.8–1.7)
TOTAL IRON BINDING CAPACITY: 367 UG/DL (ref 240–450)
TRANSFERRIN SERPL-MCNC: 246 MG/DL (ref 200–360)
TRIGL SERPL-MCNC: 55 MG/DL (ref 30–149)
TSI SER-ACNC: 0.01 MIU/ML (ref 0.36–3.74)
VLDLC SERPL CALC-MCNC: 9 MG/DL (ref 0–30)
WBC # BLD AUTO: 5.2 X10(3) UL (ref 4–11)

## 2021-07-01 PROCEDURE — 84439 ASSAY OF FREE THYROXINE: CPT

## 2021-07-01 PROCEDURE — 80061 LIPID PANEL: CPT

## 2021-07-01 PROCEDURE — 36415 COLL VENOUS BLD VENIPUNCTURE: CPT

## 2021-07-01 PROCEDURE — 3078F DIAST BP <80 MM HG: CPT | Performed by: INTERNAL MEDICINE

## 2021-07-01 PROCEDURE — 83540 ASSAY OF IRON: CPT

## 2021-07-01 PROCEDURE — 85025 COMPLETE CBC W/AUTO DIFF WBC: CPT

## 2021-07-01 PROCEDURE — 3074F SYST BP LT 130 MM HG: CPT | Performed by: INTERNAL MEDICINE

## 2021-07-01 PROCEDURE — 99214 OFFICE O/P EST MOD 30 MIN: CPT | Performed by: INTERNAL MEDICINE

## 2021-07-01 PROCEDURE — 82728 ASSAY OF FERRITIN: CPT

## 2021-07-01 PROCEDURE — 84466 ASSAY OF TRANSFERRIN: CPT

## 2021-07-01 PROCEDURE — 84443 ASSAY THYROID STIM HORMONE: CPT

## 2021-07-01 PROCEDURE — 3008F BODY MASS INDEX DOCD: CPT | Performed by: INTERNAL MEDICINE

## 2021-07-01 NOTE — ASSESSMENT & PLAN NOTE
Patient has noticed a change in the spots under her arm. They also are slightly irritating.   I will refer her to the dermatologist.

## 2021-07-01 NOTE — ASSESSMENT & PLAN NOTE
Patient has been anemic and her iron level has been normal, because she was taking iron. I advised her to stop taking iron, so that we can try to figure out the cause of her anemia. She stopped the iron last month.

## 2021-07-01 NOTE — PROGRESS NOTES
HPI:    Patient ID: Rolanda Garcia is a 67year old female. HPI:  Pt c/o nevi under her arms and one is sticking out more. Her rib pain is improving. She stopped taking iron a month ago.     Past Surgical History:   Procedure Laterality Date   • BACK BARRETT Maternal Aunt 61   • Breast Cancer Maternal Cousin Female 79   • Cancer Niece 28        pancreatic ca   • Diabetes Neg        Review of Systems   Respiratory: Negative for cough, shortness of breath and wheezing.     Cardiovascular: Negative for chest pain This Visit:  Requested Prescriptions      No prescriptions requested or ordered in this encounter

## 2021-08-14 ENCOUNTER — OFFICE VISIT (OUTPATIENT)
Dept: DERMATOLOGY CLINIC | Facility: CLINIC | Age: 76
End: 2021-08-14
Payer: MEDICARE

## 2021-08-14 DIAGNOSIS — D22.9 MULTIPLE NEVI: ICD-10-CM

## 2021-08-14 DIAGNOSIS — D23.9 BENIGN NEOPLASM OF SKIN, UNSPECIFIED LOCATION: ICD-10-CM

## 2021-08-14 DIAGNOSIS — L82.1 SEBORRHEIC KERATOSES: ICD-10-CM

## 2021-08-14 DIAGNOSIS — D48.5 NEOPLASM OF UNCERTAIN BEHAVIOR OF SKIN: Primary | ICD-10-CM

## 2021-08-14 PROCEDURE — 11102 TANGNTL BX SKIN SINGLE LES: CPT | Performed by: DERMATOLOGY

## 2021-08-14 PROCEDURE — 99203 OFFICE O/P NEW LOW 30 MIN: CPT | Performed by: DERMATOLOGY

## 2021-08-14 RX ORDER — KETOCONAZOLE 20 MG/G
1 CREAM TOPICAL 2 TIMES DAILY
Qty: 30 G | Refills: 3 | Status: SHIPPED | OUTPATIENT
Start: 2021-08-14

## 2021-08-17 ENCOUNTER — TELEPHONE (OUTPATIENT)
Dept: RHEUMATOLOGY | Facility: CLINIC | Age: 76
End: 2021-08-17

## 2021-08-17 NOTE — TELEPHONE ENCOUNTER
Patient states she was prescribed medication ketoconazole 2 % External Cream by her dermatologist, Dr. Mehdi Osborn. Patient states she was advised by pharmacy to not take medication until patient informed her primary care physician. Patient wanted to inform Dr. Silvia Tristan.

## 2021-08-17 NOTE — TELEPHONE ENCOUNTER
Spoke with [facility] pharmacist, Carmine Dee, RE: patient's question below: Carmine Dee does not see any kind of drug interaction--no notes as to why patient was told to notify Dr. Betty Casillas.     Left message to call back to clarify with patient what other pharmacist told her RE: cream     8/14/2021 office visit notes still shows open

## 2021-08-20 NOTE — PROGRESS NOTES
The pathology report from last visit showed right lateral breast Pigmented seborrheic keratosis. Please log in test results, send biopsy results letter. Pt to rtc prn.

## 2021-08-29 NOTE — PROGRESS NOTES
Operative Report                     Shave/  Tangential biopsy     Clinical diagnosis:    Size of lesion:    Location:  4 mm dark brown papule changing itchy irritated per patient at right lateral breast rule out atypical nevus. Procedure:     With

## 2021-08-29 NOTE — PROGRESS NOTES
Nigel Schilling is a 67year old female. HPI:     CC:  Patient presents with:  Upper Body Exam: New pt requesting upper body exam. Concerned with growing on right breast. Denies family and personal hx of skin ca.          Allergies:  Robaxin [Methocarbamol] 1000 MG Oral Capsule Delayed Release Take 1 capsule by mouth. • Cholecalciferol (VITAMIN D3) 400 units Oral Tab Take by mouth. • Vitamin C 500 MG Oral Tab Take 500 mg by mouth daily.      • aspirin 81 MG Oral Tab EC Take 1 tablet (81 mg total) by mo of Stress :   Social Connections:       Frequency of Communication with Friends and Family:       Frequency of Social Gatherings with Friends and Family:       Attends [employer] Services:       Active Member of Clubs or Organizations:       Attends Club or specific lesions. Otherwise remarkable for lesions as noted on map. Assessment / plan:    No orders of the defined types were placed in this encounter.       Meds & Refills for this Visit:  Requested Prescriptions     Signed Prescriptions Disp Refil understanding of these issues and agrees to the plan. The patient is asked to return as noted in follow-up/ above. This note was generated using Dragon voice recognition software.   Please contact me regarding any confusion resulting from errors in ebonie

## 2021-09-27 ENCOUNTER — HOSPITAL ENCOUNTER (EMERGENCY)
Facility: HOSPITAL | Age: 76
Discharge: HOME | End: 2021-09-27
Attending: EMERGENCY MEDICINE
Payer: MEDICARE

## 2021-09-27 ENCOUNTER — TELEPHONE (OUTPATIENT)
Dept: INTERNAL MEDICINE CLINIC | Facility: CLINIC | Age: 76
End: 2021-09-27

## 2021-09-27 ENCOUNTER — NURSE TRIAGE (OUTPATIENT)
Dept: INTERNAL MEDICINE CLINIC | Facility: CLINIC | Age: 76
End: 2021-09-27

## 2021-09-27 ENCOUNTER — APPOINTMENT (OUTPATIENT)
Dept: GENERAL RADIOLOGY | Facility: HOSPITAL | Age: 76
End: 2021-09-27
Attending: EMERGENCY MEDICINE
Payer: MEDICARE

## 2021-09-27 VITALS
DIASTOLIC BLOOD PRESSURE: 69 MMHG | RESPIRATION RATE: 19 BRPM | HEART RATE: 73 BPM | OXYGEN SATURATION: 98 % | SYSTOLIC BLOOD PRESSURE: 119 MMHG | TEMPERATURE: 99 F

## 2021-09-27 DIAGNOSIS — R07.89 CHEST WALL PAIN: Primary | ICD-10-CM

## 2021-09-27 LAB
ANION GAP SERPL CALC-SCNC: 4 MMOL/L (ref 0–18)
BASOPHILS # BLD AUTO: 0.05 X10(3) UL (ref 0–0.2)
BASOPHILS NFR BLD AUTO: 0.8 %
BUN BLD-MCNC: 13 MG/DL (ref 7–18)
BUN/CREAT SERPL: 19.7 (ref 10–20)
CALCIUM BLD-MCNC: 8.8 MG/DL (ref 8.5–10.1)
CHLORIDE SERPL-SCNC: 105 MMOL/L (ref 98–112)
CO2 SERPL-SCNC: 30 MMOL/L (ref 21–32)
CREAT BLD-MCNC: 0.66 MG/DL
DEPRECATED RDW RBC AUTO: 45.8 FL (ref 35.1–46.3)
EOSINOPHIL # BLD AUTO: 0.11 X10(3) UL (ref 0–0.7)
EOSINOPHIL NFR BLD AUTO: 1.8 %
ERYTHROCYTE [DISTWIDTH] IN BLOOD BY AUTOMATED COUNT: 12.8 % (ref 11–15)
GLUCOSE BLD-MCNC: 88 MG/DL (ref 70–99)
HCT VFR BLD AUTO: 34.5 %
HGB BLD-MCNC: 11.1 G/DL
IMM GRANULOCYTES # BLD AUTO: 0.02 X10(3) UL (ref 0–1)
IMM GRANULOCYTES NFR BLD: 0.3 %
LYMPHOCYTES # BLD AUTO: 2.12 X10(3) UL (ref 1–4)
LYMPHOCYTES NFR BLD AUTO: 34.9 %
MCH RBC QN AUTO: 31.1 PG (ref 26–34)
MCHC RBC AUTO-ENTMCNC: 32.2 G/DL (ref 31–37)
MCV RBC AUTO: 96.6 FL
MONOCYTES # BLD AUTO: 0.53 X10(3) UL (ref 0.1–1)
MONOCYTES NFR BLD AUTO: 8.7 %
NEUTROPHILS # BLD AUTO: 3.25 X10 (3) UL (ref 1.5–7.7)
NEUTROPHILS # BLD AUTO: 3.25 X10(3) UL (ref 1.5–7.7)
NEUTROPHILS NFR BLD AUTO: 53.5 %
OSMOLALITY SERPL CALC.SUM OF ELEC: 288 MOSM/KG (ref 275–295)
PLATELET # BLD AUTO: 232 10(3)UL (ref 150–450)
POTASSIUM SERPL-SCNC: 3.7 MMOL/L (ref 3.5–5.1)
RBC # BLD AUTO: 3.57 X10(6)UL
SODIUM SERPL-SCNC: 139 MMOL/L (ref 136–145)
TROPONIN I SERPL-MCNC: <0.045 NG/ML (ref ?–0.04)
WBC # BLD AUTO: 6.1 X10(3) UL (ref 4–11)

## 2021-09-27 PROCEDURE — 80048 BASIC METABOLIC PNL TOTAL CA: CPT | Performed by: EMERGENCY MEDICINE

## 2021-09-27 PROCEDURE — 84484 ASSAY OF TROPONIN QUANT: CPT | Performed by: EMERGENCY MEDICINE

## 2021-09-27 PROCEDURE — 36415 COLL VENOUS BLD VENIPUNCTURE: CPT

## 2021-09-27 PROCEDURE — 93005 ELECTROCARDIOGRAM TRACING: CPT

## 2021-09-27 PROCEDURE — 85025 COMPLETE CBC W/AUTO DIFF WBC: CPT | Performed by: EMERGENCY MEDICINE

## 2021-09-27 PROCEDURE — 93010 ELECTROCARDIOGRAM REPORT: CPT | Performed by: EMERGENCY MEDICINE

## 2021-09-27 PROCEDURE — 71045 X-RAY EXAM CHEST 1 VIEW: CPT | Performed by: EMERGENCY MEDICINE

## 2021-09-27 PROCEDURE — 99284 EMERGENCY DEPT VISIT MOD MDM: CPT

## 2021-09-27 RX ORDER — ACETAMINOPHEN AND CODEINE PHOSPHATE 300; 30 MG/1; MG/1
1 TABLET ORAL EVERY 6 HOURS PRN
Qty: 10 TABLET | Refills: 0 | Status: SHIPPED | OUTPATIENT
Start: 2021-09-27 | End: 2021-09-30

## 2021-09-27 RX ORDER — ACETAMINOPHEN 500 MG
1000 TABLET ORAL ONCE
Status: COMPLETED | OUTPATIENT
Start: 2021-09-27 | End: 2021-09-27

## 2021-09-27 RX ORDER — MAGNESIUM HYDROXIDE/ALUMINUM HYDROXICE/SIMETHICONE 120; 1200; 1200 MG/30ML; MG/30ML; MG/30ML
30 SUSPENSION ORAL ONCE
Status: COMPLETED | OUTPATIENT
Start: 2021-09-27 | End: 2021-09-27

## 2021-09-27 RX ORDER — ACETAMINOPHEN AND CODEINE PHOSPHATE 300; 30 MG/1; MG/1
1 TABLET ORAL EVERY 12 HOURS PRN
Qty: 10 TABLET | Refills: 0 | Status: SHIPPED | OUTPATIENT
Start: 2021-09-27 | End: 2021-09-30

## 2021-09-27 NOTE — TELEPHONE ENCOUNTER
Action Requested: Summary for Provider     []  Critical Lab, Recommendations Needed  [x] Need Additional Advice  []   FYI    []   Need Orders  [] Need Medications Sent to Pharmacy  []  Other     SUMMARY: Per protocol, patient should be seen in UC or ER.  Pa

## 2021-09-27 NOTE — TELEPHONE ENCOUNTER
She could also come at 8:10 tomorrow to [address] [place] [address], [address], since she might not be able to come to [place].

## 2021-09-27 NOTE — ED QUICK NOTES
Patient presents with:  Chest Pain Angina  Breast Problem    Patient aox3 to ed via private vehicle patient co of epigastric pain x 4 days denies cough/fever/sob +dizziness intermittent. Patient changed in to gown on nibp cardiac and spo2 monitors.  Side

## 2021-09-27 NOTE — TELEPHONE ENCOUNTER
Spoke to patient. She said her pain is new and in the middle of her chest.     Writer let her know that since this is a new pain, provider does think she should go to ER. Patient discussed finances again.  Writer let her know that if she goes to UC, it is a

## 2021-09-27 NOTE — ED INITIAL ASSESSMENT (HPI)
Emmanuel Ray is here for evaluation of chest pain of three days, right breast pain attributed to mole removed from right breast one month ago, and weight loss.

## 2021-09-27 NOTE — TELEPHONE ENCOUNTER
Dr Janette Duckworth, per patient she is not available in the morning on 09/30/2021 but she is available after 12:00 noon,  Please advise. thank you.     Please reply to pool: [facility] IM [facility] ALG RHE

## 2021-09-27 NOTE — TELEPHONE ENCOUNTER
Is the pain a little on the right side, like she had in the past?  Is it worse when she walks? If yes, I could see her tonight at [address]. If not, she should go to the ER.

## 2021-09-28 NOTE — ED PROVIDER NOTES
Patient Seen in: [facility] AND [facility] Emergency Department    History   Patient presents with:  Chest Pain Angina  Breast Problem    Stated Complaint: cp, sob    HPI    67year old female presenting with chest pain. Pain began few days ago .  The patient darlyn food.   Omega-3 Fatty Acids (FISH OIL) 1000 MG Oral Capsule Delayed Release,  Take 1 capsule by mouth. Cholecalciferol (VITAMIN D3) 400 units Oral Tab,  Take by mouth. Vitamin C 500 MG Oral Tab,  Take 500 mg by mouth daily.    aspirin 81 MG Oral Tab EC, CVA tenderness. Skin: Warm, dry, no erythema, no rash. Musculoskeletal: Intact distal pulses, no edema, no tenderness, no cyanosis, no clubbing. Good range of motion in all major joints. No tenderness to palpation or major deformities noted.  Back- No ten Procedures:      Critical Care:      HEART score for chest pain  History- (Highly suspicious 2pt, Mod 1pt, slightly 0pt)        0  ECG- (significant ST deviation 2pt, Non spec 1pt, nl 0pt)  0  AGE- (>65 2pt, 45-64 1 pt, Under 45 0 pt)    2  Risk Fa R-0                           Disposition and Plan     Clinical Impression:  Chest wall pain  (primary encounter diagnosis)    Disposition:  Discharge    Follow-up:  Bessy Mckinley, [address] [place] [address]

## 2021-09-30 ENCOUNTER — LAB ENCOUNTER (OUTPATIENT)
Dept: LAB | Facility: HOSPITAL | Age: 76
End: 2021-09-30
Attending: INTERNAL MEDICINE
Payer: MEDICARE

## 2021-09-30 ENCOUNTER — OFFICE VISIT (OUTPATIENT)
Dept: INTERNAL MEDICINE CLINIC | Facility: CLINIC | Age: 76
End: 2021-09-30
Payer: MEDICARE

## 2021-09-30 VITALS
WEIGHT: 121.63 LBS | HEIGHT: 64 IN | DIASTOLIC BLOOD PRESSURE: 68 MMHG | TEMPERATURE: 97 F | SYSTOLIC BLOOD PRESSURE: 116 MMHG | BODY MASS INDEX: 20.76 KG/M2 | HEART RATE: 67 BPM

## 2021-09-30 DIAGNOSIS — E03.9 ACQUIRED HYPOTHYROIDISM: ICD-10-CM

## 2021-09-30 DIAGNOSIS — K59.04 CHRONIC IDIOPATHIC CONSTIPATION: Primary | ICD-10-CM

## 2021-09-30 DIAGNOSIS — D64.9 ANEMIA, UNSPECIFIED TYPE: ICD-10-CM

## 2021-09-30 DIAGNOSIS — Z23 NEED FOR VACCINATION: ICD-10-CM

## 2021-09-30 DIAGNOSIS — R63.4 WEIGHT LOSS, UNINTENTIONAL: ICD-10-CM

## 2021-09-30 DIAGNOSIS — R10.13 EPIGASTRIC PAIN: ICD-10-CM

## 2021-09-30 PROCEDURE — 99214 OFFICE O/P EST MOD 30 MIN: CPT | Performed by: INTERNAL MEDICINE

## 2021-09-30 PROCEDURE — 84443 ASSAY THYROID STIM HORMONE: CPT

## 2021-09-30 PROCEDURE — G0008 ADMIN INFLUENZA VIRUS VAC: HCPCS | Performed by: INTERNAL MEDICINE

## 2021-09-30 PROCEDURE — 3074F SYST BP LT 130 MM HG: CPT | Performed by: INTERNAL MEDICINE

## 2021-09-30 PROCEDURE — 3078F DIAST BP <80 MM HG: CPT | Performed by: INTERNAL MEDICINE

## 2021-09-30 PROCEDURE — 90662 IIV NO PRSV INCREASED AG IM: CPT | Performed by: INTERNAL MEDICINE

## 2021-09-30 PROCEDURE — 36415 COLL VENOUS BLD VENIPUNCTURE: CPT

## 2021-09-30 PROCEDURE — 85025 COMPLETE CBC W/AUTO DIFF WBC: CPT

## 2021-09-30 PROCEDURE — 84481 FREE ASSAY (FT-3): CPT

## 2021-09-30 PROCEDURE — 3008F BODY MASS INDEX DOCD: CPT | Performed by: INTERNAL MEDICINE

## 2021-09-30 PROCEDURE — 84439 ASSAY OF FREE THYROXINE: CPT

## 2021-09-30 RX ORDER — PANTOPRAZOLE SODIUM 20 MG/1
20 TABLET, DELAYED RELEASE ORAL
Qty: 90 TABLET | Refills: 1 | Status: SHIPPED | OUTPATIENT
Start: 2021-09-30

## 2021-09-30 NOTE — ASSESSMENT & PLAN NOTE
Reviewed ER notes, labs, and imaging reports. Patient went to the emergency room for what she calls chest pain, but she points to the epigastrium. She has had another episode of this yesterday. We will add pantoprazole 20 mg daily.

## 2021-09-30 NOTE — PROGRESS NOTES
HPI:    Patient ID: Mela Min is a 67year old female. HPI:  Pt went to the ER for chest pain. She has been losing weight, however she has also been exercising.       Past Surgical History:   Procedure Laterality Date   • BACK SURGERY     • CATARACT Use: Never used    Alcohol use: Yes      Comment: ocassionally    Drug use: No    Family History   Problem Relation Age of Onset   • Cancer Father    • Heart Disorder Mother         Stroke   • Cancer Brother    • Breast Cancer Maternal Aunt 61   • Breast C stable off iron. Weight loss, unintentional     Patient lost some weight, but had started an exercise program which may have contributed to this. Will refer her to GI.          Relevant Orders    EVALUATE & TREAT, GASTRO (INTERNAL)    Epigastric pa

## 2021-09-30 NOTE — ASSESSMENT & PLAN NOTE
Colonoscopy at [facility] August 2013 in Media section  Old records. Patient would like another colonoscopy for cancer screening.

## 2021-09-30 NOTE — ASSESSMENT & PLAN NOTE
Patient's last TSH was suppressed, however it has been difficult to get her levels steady due to compliance issues. We will leave the dose for now and recheck it.

## 2021-09-30 NOTE — ASSESSMENT & PLAN NOTE
Patient lost some weight, but had started an exercise program which may have contributed to this. Will refer her to GI.

## 2021-10-11 RX ORDER — ALENDRONATE SODIUM 70 MG/1
TABLET ORAL
Qty: 12 TABLET | Refills: 1 | Status: SHIPPED | OUTPATIENT
Start: 2021-10-11

## 2021-11-28 DIAGNOSIS — E03.9 ACQUIRED HYPOTHYROIDISM: ICD-10-CM

## 2021-11-28 RX ORDER — LEVOTHYROXINE SODIUM 0.15 MG/1
150 TABLET ORAL
Qty: 90 TABLET | Refills: 0 | Status: SHIPPED | OUTPATIENT
Start: 2021-11-28 | End: 2021-12-08

## 2021-12-07 ENCOUNTER — LAB ENCOUNTER (OUTPATIENT)
Dept: LAB | Facility: HOSPITAL | Age: 76
End: 2021-12-07
Attending: INTERNAL MEDICINE
Payer: MEDICARE

## 2021-12-07 ENCOUNTER — OFFICE VISIT (OUTPATIENT)
Dept: INTERNAL MEDICINE CLINIC | Facility: CLINIC | Age: 76
End: 2021-12-07
Payer: MEDICARE

## 2021-12-07 VITALS
WEIGHT: 124.5 LBS | RESPIRATION RATE: 16 BRPM | HEIGHT: 64 IN | DIASTOLIC BLOOD PRESSURE: 62 MMHG | TEMPERATURE: 98 F | SYSTOLIC BLOOD PRESSURE: 117 MMHG | HEART RATE: 75 BPM | BODY MASS INDEX: 21.25 KG/M2

## 2021-12-07 DIAGNOSIS — Z12.31 ENCOUNTER FOR SCREENING MAMMOGRAM FOR MALIGNANT NEOPLASM OF BREAST: Primary | ICD-10-CM

## 2021-12-07 DIAGNOSIS — D64.9 ANEMIA, UNSPECIFIED TYPE: ICD-10-CM

## 2021-12-07 DIAGNOSIS — E03.9 ACQUIRED HYPOTHYROIDISM: ICD-10-CM

## 2021-12-07 DIAGNOSIS — E78.00 HYPERCHOLESTEROLEMIA: ICD-10-CM

## 2021-12-07 DIAGNOSIS — H40.009 PREGLAUCOMA, UNSPECIFIED LATERALITY: ICD-10-CM

## 2021-12-07 PROBLEM — E46 PROTEIN-CALORIE MALNUTRITION, UNSPECIFIED SEVERITY (HCC): Status: RESOLVED | Noted: 2021-12-07 | Resolved: 2021-12-07

## 2021-12-07 PROBLEM — R63.4 WEIGHT LOSS, UNINTENTIONAL: Status: RESOLVED | Noted: 2021-09-30 | Resolved: 2021-12-07

## 2021-12-07 PROBLEM — E46 PROTEIN-CALORIE MALNUTRITION, UNSPECIFIED SEVERITY (HCC): Status: ACTIVE | Noted: 2021-12-07

## 2021-12-07 PROCEDURE — 83540 ASSAY OF IRON: CPT

## 2021-12-07 PROCEDURE — 3074F SYST BP LT 130 MM HG: CPT | Performed by: INTERNAL MEDICINE

## 2021-12-07 PROCEDURE — 84466 ASSAY OF TRANSFERRIN: CPT

## 2021-12-07 PROCEDURE — 36415 COLL VENOUS BLD VENIPUNCTURE: CPT

## 2021-12-07 PROCEDURE — 84481 FREE ASSAY (FT-3): CPT

## 2021-12-07 PROCEDURE — 82728 ASSAY OF FERRITIN: CPT

## 2021-12-07 PROCEDURE — 3008F BODY MASS INDEX DOCD: CPT | Performed by: INTERNAL MEDICINE

## 2021-12-07 PROCEDURE — 84439 ASSAY OF FREE THYROXINE: CPT

## 2021-12-07 PROCEDURE — 84443 ASSAY THYROID STIM HORMONE: CPT

## 2021-12-07 PROCEDURE — 85025 COMPLETE CBC W/AUTO DIFF WBC: CPT

## 2021-12-07 PROCEDURE — 3078F DIAST BP <80 MM HG: CPT | Performed by: INTERNAL MEDICINE

## 2021-12-07 PROCEDURE — 99214 OFFICE O/P EST MOD 30 MIN: CPT | Performed by: INTERNAL MEDICINE

## 2021-12-07 RX ORDER — ATORVASTATIN CALCIUM 40 MG/1
40 TABLET, FILM COATED ORAL NIGHTLY
Qty: 90 TABLET | Refills: 1 | Status: SHIPPED | OUTPATIENT
Start: 2021-12-07 | End: 2021-12-20

## 2021-12-07 NOTE — PROGRESS NOTES
HPI:    Patient ID: Tamica Covarrubias is a 67year old female. HPI:  Pt still gets the chronic right sided rib pain. She was losing weight so she stopped exercising and now her weight is stable. She still does the exercise bike.   She will need a refill on tablet (81 mg total) by mouth daily.  30 tablet 0       Allergies:  Robaxin [Methocarba*    ITCHING     Social History    Tobacco Use      Smoking status: Former Smoker      Smokeless tobacco: Never Used    Vaping Use      Vaping Use: Never used    Alcohol Stable. Follow-up with ophthalmology. Relevant Orders    OPHTHALMOLOGY - INTERNAL    Anemia     Patient stopped taking iron as per my instructions. Check CBC today.          Relevant Orders    CBC WITH DIFFERENTIAL WITH PLATELET    FERRITIN    IRO

## 2021-12-20 DIAGNOSIS — E78.00 HYPERCHOLESTEROLEMIA: ICD-10-CM

## 2021-12-20 RX ORDER — ATORVASTATIN CALCIUM 40 MG/1
40 TABLET, FILM COATED ORAL NIGHTLY
Qty: 90 TABLET | Refills: 1 | Status: SHIPPED | OUTPATIENT
Start: 2021-12-20 | End: 2023-02-23

## 2021-12-20 NOTE — TELEPHONE ENCOUNTER
Refill passed per [address] [facility] protocol.   Requested Prescriptions   Pending Prescriptions Disp Refills    ATORVASTATIN 40 MG Oral Tab [Pharmacy Med Name: ATORVASTATIN CALCIUM 40 MG Tablet] 90 tablet 1     Sig: TAKE 1 TABLET EVERY NIGHT        Cholesterol Medication Protocol Passed - 12/20/2021  2:45 AM        Passed - ALT in past 12 months        Passed - LDL in past 12 months        Passed - Last ALT < 80       Lab Results   Component Value Date    ALT 25 03/03/2021             Passed - Last LDL < 130     Lab Results   Component Value Date     (H) 07/01/2021               Passed - Appointment in past 12 or next 3 months               Recent Outpatient Visits              1 week ago Encounter for screening mammogram for malignant neoplasm of breast    [address] [facility], [address], Abbe Pollard MD    Office Visit    2 months ago Chronic idiopathic constipation    [address], [address], Abbe Pollard MD    Office Visit    4 months ago Neoplasm of uncertain behavior of skin    [address] [facility] Marybelle Najjar, MD    Office Visit    5 months ago Anemia, unspecified type    [address] [facility], [address], Abbe Pollard MD    Office Visit    7 months ago Contusion of rib on right side, subsequent encounter    Abbe Argueta MD    Office Visit            Future Appointments         Provider Department Appt Notes    In 2 weeks [facility], [facility] THE [place] [address] for [address] 11/24/20    In 1 month Melanie [place], [address] [facility], [address] [place], [place] [address]N screen, policy informed     In 2 months Bisi Terry MD [address] [facility], [address], [facility] px

## 2021-12-22 DIAGNOSIS — I10 ESSENTIAL HYPERTENSION: ICD-10-CM

## 2021-12-23 RX ORDER — HYDROCHLOROTHIAZIDE 12.5 MG/1
CAPSULE, GELATIN COATED ORAL
Qty: 90 CAPSULE | Refills: 1 | Status: SHIPPED | OUTPATIENT
Start: 2021-12-23

## 2021-12-23 RX ORDER — POTASSIUM CHLORIDE 750 MG/1
TABLET, EXTENDED RELEASE ORAL
Qty: 90 TABLET | Refills: 1 | Status: SHIPPED | OUTPATIENT
Start: 2021-12-23

## 2022-01-09 ENCOUNTER — HOSPITAL ENCOUNTER (OUTPATIENT)
Dept: MAMMOGRAPHY | Facility: HOSPITAL | Age: 77
Discharge: HOME | End: 2022-01-09
Attending: INTERNAL MEDICINE
Payer: MEDICARE

## 2022-01-09 DIAGNOSIS — Z12.31 ENCOUNTER FOR SCREENING MAMMOGRAM FOR MALIGNANT NEOPLASM OF BREAST: ICD-10-CM

## 2022-01-09 PROCEDURE — 77063 BREAST TOMOSYNTHESIS BI: CPT | Performed by: INTERNAL MEDICINE

## 2022-01-09 PROCEDURE — 77067 SCR MAMMO BI INCL CAD: CPT | Performed by: INTERNAL MEDICINE

## 2022-01-21 ENCOUNTER — OFFICE VISIT (OUTPATIENT)
Dept: GASTROENTEROLOGY | Facility: CLINIC | Age: 77
End: 2022-01-21
Payer: MEDICARE

## 2022-01-21 ENCOUNTER — TELEPHONE (OUTPATIENT)
Dept: GASTROENTEROLOGY | Facility: CLINIC | Age: 77
End: 2022-01-21

## 2022-01-21 VITALS
HEIGHT: 64 IN | HEART RATE: 80 BPM | DIASTOLIC BLOOD PRESSURE: 65 MMHG | BODY MASS INDEX: 22.02 KG/M2 | SYSTOLIC BLOOD PRESSURE: 113 MMHG | WEIGHT: 129 LBS

## 2022-01-21 DIAGNOSIS — K59.04 CHRONIC IDIOPATHIC CONSTIPATION: Primary | ICD-10-CM

## 2022-01-21 DIAGNOSIS — R63.4 WEIGHT LOSS: ICD-10-CM

## 2022-01-21 DIAGNOSIS — Z12.11 SCREEN FOR COLON CANCER: Primary | ICD-10-CM

## 2022-01-21 DIAGNOSIS — R10.13 EPIGASTRIC PAIN: ICD-10-CM

## 2022-01-21 PROCEDURE — 3074F SYST BP LT 130 MM HG: CPT | Performed by: INTERNAL MEDICINE

## 2022-01-21 PROCEDURE — 99204 OFFICE O/P NEW MOD 45 MIN: CPT | Performed by: INTERNAL MEDICINE

## 2022-01-21 PROCEDURE — 3078F DIAST BP <80 MM HG: CPT | Performed by: INTERNAL MEDICINE

## 2022-01-21 PROCEDURE — 3008F BODY MASS INDEX DOCD: CPT | Performed by: INTERNAL MEDICINE

## 2022-01-21 RX ORDER — POLYETHYLENE GLYCOL 3350, SODIUM CHLORIDE, SODIUM BICARBONATE, POTASSIUM CHLORIDE 420; 11.2; 5.72; 1.48 G/4L; G/4L; G/4L; G/4L
POWDER, FOR SOLUTION ORAL
Qty: 1 EACH | Refills: 0 | Status: SHIPPED | OUTPATIENT
Start: 2022-01-21

## 2022-01-21 NOTE — PATIENT INSTRUCTIONS
1. Schedule EGD/colonoscopy with MAC [Diagnosis: epigastric pain, weight loss, screeing]    2.  bowel prep from pharmacy (split golytely)    3. Continue all medications for procedure    4. Read all bowel prep instructions carefully    5.  AVOID seeds

## 2022-01-21 NOTE — TELEPHONE ENCOUNTER
Scheduled for:  Colonoscopy 00219/EGD [address]  Provider Name:  Dr Nandini Garcia  Date:  Tuesday, 05/10/2022  Location:  [facility]  Sedation:  MAC  Time:  10:00am ( pt is aware arrival time is at 9:00am)  Prep:  Trilyte  Meds/Allergies Reconciled?:  Physician Reviewed  Shirley

## 2022-01-21 NOTE — H&P
[address] - Gastroenterology                                                                                                               Reason for consult: c removed   • OTHER SURGICAL HISTORY      Thyroid nodule removed      Family Hx:   Family History   Problem Relation Age of Onset   • Cancer Father    • Heart Disorder Mother         Stroke   • Cancer Brother    • Breast Cancer Maternal Aunt 61   • Breast Ca BEFORE EATING.  DO NOT LAY DOWN 12 tablet 1       Allergies:    Robaxin [Methocarba*    ITCHING    ROS:   CONSTITUTIONAL:  negative for fevers, rigors  EYES:  negative for diplopia   RESPIRATORY:  negative for severe shortness of breath  CARDIOVASCULAR:  ne vague epigastric pain that was not cardiac; she was placed on pantoprazole 20mg/day which has improved sx. I asked her to continue PPI. Recommendations:  1. Schedule EGD/colonoscopy with MAC [Diagnosis: epigastric pain, weight loss, screeing]    2.  [place] Cancel Prescriptions     Signed Prescriptions Disp Refills   • PEG 3350-KCl-Na Bicarb-NaCl 420 g Oral Recon Soln 1 each 0     Sig: As directed by GI clinic. Imaging & Referrals:  None         CHRISTIE Keene MD  Pager: [phone]  1/21/2022        This n

## 2022-02-23 ENCOUNTER — OFFICE VISIT (OUTPATIENT)
Dept: INTERNAL MEDICINE CLINIC | Facility: CLINIC | Age: 77
End: 2022-02-23
Payer: MEDICARE

## 2022-02-23 VITALS
SYSTOLIC BLOOD PRESSURE: 112 MMHG | HEIGHT: 64 IN | BODY MASS INDEX: 21.99 KG/M2 | HEART RATE: 69 BPM | WEIGHT: 128.81 LBS | DIASTOLIC BLOOD PRESSURE: 67 MMHG

## 2022-02-23 DIAGNOSIS — G89.29 CHRONIC PAIN OF LEFT KNEE: Primary | ICD-10-CM

## 2022-02-23 DIAGNOSIS — R10.13 EPIGASTRIC PAIN: ICD-10-CM

## 2022-02-23 DIAGNOSIS — N64.4 BREAST PAIN: ICD-10-CM

## 2022-02-23 DIAGNOSIS — M25.562 CHRONIC PAIN OF LEFT KNEE: Primary | ICD-10-CM

## 2022-02-23 PROCEDURE — 99214 OFFICE O/P EST MOD 30 MIN: CPT | Performed by: NURSE PRACTITIONER

## 2022-02-23 PROCEDURE — 3074F SYST BP LT 130 MM HG: CPT | Performed by: NURSE PRACTITIONER

## 2022-02-23 PROCEDURE — 3008F BODY MASS INDEX DOCD: CPT | Performed by: NURSE PRACTITIONER

## 2022-02-23 PROCEDURE — 3078F DIAST BP <80 MM HG: CPT | Performed by: NURSE PRACTITIONER

## 2022-02-23 RX ORDER — PANTOPRAZOLE SODIUM 20 MG/1
20 TABLET, DELAYED RELEASE ORAL
Qty: 90 TABLET | Refills: 1 | Status: SHIPPED | OUTPATIENT
Start: 2022-02-23 | End: 2022-06-22

## 2022-02-23 NOTE — ASSESSMENT & PLAN NOTE
60-year-old female who continues to have right breast pain which I saw her last year and referred her to [facility]. Her mammograms have been within normal limits but she continues to have intermittent pain 1 cm from her right nipple at the 12 o'clock position. No abnormality of breast exam.  No redness swelling or signs of infection. No nipple discharge.     Plan  Refer to Dr. Karma Nicholas for evaluation

## 2022-02-23 NOTE — ASSESSMENT & PLAN NOTE
74-year-old pleasant -American female complaining of left knee pain. Patient stated she fell many years ago at work and he has had intermittent knee pain. When she flexes and extends her leg she hears a click. She bought over-the-counter Voltaren but was requesting a steroid injection. She has not used the Voltaren yet.     Plan  Apply Voltaren gel daily  Referred to orthopedics for evaluation and possible steroid injection

## 2022-02-23 NOTE — TELEPHONE ENCOUNTER
Refill passed per [employer] [facility] protocol.     Requested Prescriptions   Pending Prescriptions Disp Refills    PANTOPRAZOLE 20 MG Oral Tab EC [Pharmacy Med Name: PANTOPRAZOLE SODIUM 20 MG Tablet Delayed Release] 90 tablet 1     Sig: Take 1 tablet (20 mg total) by mouth every morning before breakfast.        Gastrointestional Medication Protocol Passed - 2/23/2022  3:28 AM        Passed - Appointment in past 12 or next 3 months              Recent Outpatient Visits              Today Chronic pain of left knee    Piero Yousif [place], APRN    Office Visit    1 month ago Chronic idiopathic constipation    [place] [employer] [facility] [place], [facility], [facility], Gildardo Fishman MD    Office Visit    2 months ago Encounter for screening mammogram for malignant neoplasm of breast    [facility], [facility], Sanots Osei MD    Office Visit    4 months ago Chronic idiopathic constipation    [facility], [facility], Santos Osei MD    Office Visit    6 months ago Neoplasm of uncertain behavior of skin    [facility] Domi Rubio MD    Office Visit            Future Appointments         Provider Department Appt Notes    In 1 week Le Gonzalez MD [place] [employer] [facility] [facility], Brendan Osei px    In 2 months BENNY, PROCEDURE [place]. Jatin Betancur 57 GI PROCEDURE Colon/EGD w/ MAC @ [facility]

## 2022-03-08 ENCOUNTER — OFFICE VISIT (OUTPATIENT)
Dept: INTERNAL MEDICINE CLINIC | Facility: CLINIC | Age: 77
End: 2022-03-08
Payer: MEDICARE

## 2022-03-08 ENCOUNTER — LAB ENCOUNTER (OUTPATIENT)
Dept: LAB | Facility: HOSPITAL | Age: 77
End: 2022-03-08
Attending: INTERNAL MEDICINE
Payer: MEDICARE

## 2022-03-08 VITALS
BODY MASS INDEX: 21.4 KG/M2 | HEART RATE: 80 BPM | HEIGHT: 64 IN | RESPIRATION RATE: 16 BRPM | WEIGHT: 125.38 LBS | SYSTOLIC BLOOD PRESSURE: 128 MMHG | DIASTOLIC BLOOD PRESSURE: 66 MMHG

## 2022-03-08 DIAGNOSIS — M25.562 CHRONIC PAIN OF LEFT KNEE: ICD-10-CM

## 2022-03-08 DIAGNOSIS — E03.9 ACQUIRED HYPOTHYROIDISM: ICD-10-CM

## 2022-03-08 DIAGNOSIS — D64.9 ANEMIA, UNSPECIFIED TYPE: ICD-10-CM

## 2022-03-08 DIAGNOSIS — H40.009 PREGLAUCOMA, UNSPECIFIED LATERALITY: ICD-10-CM

## 2022-03-08 DIAGNOSIS — I10 ESSENTIAL HYPERTENSION: ICD-10-CM

## 2022-03-08 DIAGNOSIS — E78.00 HYPERCHOLESTEROLEMIA: ICD-10-CM

## 2022-03-08 DIAGNOSIS — G89.29 CHRONIC PAIN OF LEFT KNEE: ICD-10-CM

## 2022-03-08 DIAGNOSIS — M81.0 SENILE OSTEOPOROSIS: ICD-10-CM

## 2022-03-08 DIAGNOSIS — I67.9 CEREBROVASCULAR DISEASE: ICD-10-CM

## 2022-03-08 DIAGNOSIS — Z00.00 ENCOUNTER FOR MEDICARE ANNUAL WELLNESS EXAM: Primary | ICD-10-CM

## 2022-03-08 DIAGNOSIS — M15.9 OSTEOARTHRITIS OF MULTIPLE JOINTS, UNSPECIFIED OSTEOARTHRITIS TYPE: ICD-10-CM

## 2022-03-08 DIAGNOSIS — N64.4 BREAST PAIN: ICD-10-CM

## 2022-03-08 DIAGNOSIS — K59.04 CHRONIC IDIOPATHIC CONSTIPATION: ICD-10-CM

## 2022-03-08 PROBLEM — S20.211A CONTUSION OF RIB ON RIGHT SIDE: Status: RESOLVED | Noted: 2021-05-18 | Resolved: 2022-03-08

## 2022-03-08 PROBLEM — R10.13 EPIGASTRIC PAIN: Status: RESOLVED | Noted: 2021-09-30 | Resolved: 2022-03-08

## 2022-03-08 PROBLEM — D22.9 CHANGE IN MULTIPLE NEVI: Status: RESOLVED | Noted: 2021-07-01 | Resolved: 2022-03-08

## 2022-03-08 LAB
ALBUMIN SERPL-MCNC: 4.2 G/DL (ref 3.4–5)
ALBUMIN/GLOB SERPL: 1.1 {RATIO} (ref 1–2)
ALP LIVER SERPL-CCNC: 63 U/L
ALT SERPL-CCNC: 16 U/L
ANION GAP SERPL CALC-SCNC: 6 MMOL/L (ref 0–18)
AST SERPL-CCNC: 15 U/L (ref 15–37)
BILIRUB SERPL-MCNC: 1.1 MG/DL (ref 0.1–2)
BUN BLD-MCNC: 17 MG/DL (ref 7–18)
BUN/CREAT SERPL: 18.9 (ref 10–20)
CALCIUM BLD-MCNC: 9.9 MG/DL (ref 8.5–10.1)
CHLORIDE SERPL-SCNC: 99 MMOL/L (ref 98–112)
CHOLEST SERPL-MCNC: 238 MG/DL (ref ?–200)
CO2 SERPL-SCNC: 32 MMOL/L (ref 21–32)
CREAT BLD-MCNC: 0.9 MG/DL
FASTING PATIENT LIPID ANSWER: YES
FASTING STATUS PATIENT QL REPORTED: YES
GLOBULIN PLAS-MCNC: 4 G/DL (ref 2.8–4.4)
GLUCOSE BLD-MCNC: 95 MG/DL (ref 70–99)
HDLC SERPL-MCNC: 87 MG/DL (ref 40–59)
LDLC SERPL CALC-MCNC: 140 MG/DL (ref ?–100)
NONHDLC SERPL-MCNC: 151 MG/DL (ref ?–130)
OSMOLALITY SERPL CALC.SUM OF ELEC: 285 MOSM/KG (ref 275–295)
POTASSIUM SERPL-SCNC: 3.9 MMOL/L (ref 3.5–5.1)
PROT SERPL-MCNC: 8.2 G/DL (ref 6.4–8.2)
SODIUM SERPL-SCNC: 137 MMOL/L (ref 136–145)
T4 FREE SERPL-MCNC: 1.8 NG/DL (ref 0.8–1.7)
TRIGL SERPL-MCNC: 65 MG/DL (ref 30–149)
TSI SER-ACNC: 0.15 MIU/ML (ref 0.36–3.74)
VLDLC SERPL CALC-MCNC: 12 MG/DL (ref 0–30)

## 2022-03-08 PROCEDURE — 3078F DIAST BP <80 MM HG: CPT | Performed by: INTERNAL MEDICINE

## 2022-03-08 PROCEDURE — 96160 PT-FOCUSED HLTH RISK ASSMT: CPT | Performed by: INTERNAL MEDICINE

## 2022-03-08 PROCEDURE — G0439 PPPS, SUBSEQ VISIT: HCPCS | Performed by: INTERNAL MEDICINE

## 2022-03-08 PROCEDURE — 84443 ASSAY THYROID STIM HORMONE: CPT

## 2022-03-08 PROCEDURE — 84439 ASSAY OF FREE THYROXINE: CPT

## 2022-03-08 PROCEDURE — 36415 COLL VENOUS BLD VENIPUNCTURE: CPT

## 2022-03-08 PROCEDURE — 80053 COMPREHEN METABOLIC PANEL: CPT

## 2022-03-08 PROCEDURE — 3074F SYST BP LT 130 MM HG: CPT | Performed by: INTERNAL MEDICINE

## 2022-03-08 PROCEDURE — 99397 PER PM REEVAL EST PAT 65+ YR: CPT | Performed by: INTERNAL MEDICINE

## 2022-03-08 PROCEDURE — 80061 LIPID PANEL: CPT

## 2022-03-08 PROCEDURE — 3008F BODY MASS INDEX DOCD: CPT | Performed by: INTERNAL MEDICINE

## 2022-03-08 NOTE — ASSESSMENT & PLAN NOTE
Patient received a Reclast injection in March 2021, but has not followed up. I urged her to schedule a f/u appointment with endocrinology. Check vitamin D level today.

## 2022-03-08 NOTE — ASSESSMENT & PLAN NOTE
Unremarkable exam.  The patient has scheduled a colonoscopy for May. Immunizations were reviewed. I recommend the Shingrix vaccine.

## 2022-03-08 NOTE — ASSESSMENT & PLAN NOTE
Patient's breast imaging has been normal, but she continues to have left breast pain. She has an appointment with the breast surgeon for further evaluation.

## 2022-04-04 ENCOUNTER — HOSPITAL ENCOUNTER (OUTPATIENT)
Dept: GENERAL RADIOLOGY | Facility: HOSPITAL | Age: 77
Discharge: HOME | End: 2022-04-04
Attending: ORTHOPAEDIC SURGERY
Payer: MEDICARE

## 2022-04-04 ENCOUNTER — OFFICE VISIT (OUTPATIENT)
Dept: ORTHOPEDICS CLINIC | Facility: CLINIC | Age: 77
End: 2022-04-04
Payer: MEDICARE

## 2022-04-04 DIAGNOSIS — G89.29 CHRONIC PAIN OF LEFT KNEE: ICD-10-CM

## 2022-04-04 DIAGNOSIS — M17.12 PRIMARY OSTEOARTHRITIS OF LEFT KNEE: Primary | ICD-10-CM

## 2022-04-04 DIAGNOSIS — M25.562 CHRONIC PAIN OF LEFT KNEE: ICD-10-CM

## 2022-04-04 DIAGNOSIS — M17.11 PRIMARY OSTEOARTHRITIS OF RIGHT KNEE: ICD-10-CM

## 2022-04-04 PROCEDURE — 99204 OFFICE O/P NEW MOD 45 MIN: CPT | Performed by: ORTHOPAEDIC SURGERY

## 2022-04-04 PROCEDURE — 73564 X-RAY EXAM KNEE 4 OR MORE: CPT | Performed by: ORTHOPAEDIC SURGERY

## 2022-04-11 ENCOUNTER — MED REC SCAN ONLY (OUTPATIENT)
Dept: INTERNAL MEDICINE CLINIC | Facility: CLINIC | Age: 77
End: 2022-04-11

## 2022-04-18 ENCOUNTER — TELEPHONE (OUTPATIENT)
Dept: ORTHOPEDICS CLINIC | Facility: CLINIC | Age: 77
End: 2022-04-18

## 2022-04-18 NOTE — TELEPHONE ENCOUNTER
Type of surgery:  Left total knee arthroplasty  Date: 6/23/22  Location: [facility]  Medical Clearance:      *Medical: Yes      *Dental: No      *Other:  Prior Authorization Status: Pending  Workers Comp:  [employer]: [id number]  [place]: Yes  POV: 7/5/22

## 2022-04-21 ENCOUNTER — OFFICE VISIT (OUTPATIENT)
Dept: INTERNAL MEDICINE CLINIC | Facility: CLINIC | Age: 77
End: 2022-04-21
Payer: MEDICARE

## 2022-04-21 VITALS
TEMPERATURE: 98 F | HEART RATE: 79 BPM | WEIGHT: 128.81 LBS | HEIGHT: 64 IN | SYSTOLIC BLOOD PRESSURE: 136 MMHG | BODY MASS INDEX: 21.99 KG/M2 | DIASTOLIC BLOOD PRESSURE: 74 MMHG

## 2022-04-21 DIAGNOSIS — E03.9 ACQUIRED HYPOTHYROIDISM: ICD-10-CM

## 2022-04-21 DIAGNOSIS — L82.1 SEBORRHEIC KERATOSIS: Primary | ICD-10-CM

## 2022-04-21 DIAGNOSIS — R07.81 RIB PAIN ON RIGHT SIDE: ICD-10-CM

## 2022-04-21 DIAGNOSIS — I67.9 CEREBROVASCULAR DISEASE: ICD-10-CM

## 2022-04-21 DIAGNOSIS — M17.12 OSTEOARTHRITIS OF LEFT KNEE, UNSPECIFIED OSTEOARTHRITIS TYPE: ICD-10-CM

## 2022-04-21 DIAGNOSIS — I10 ESSENTIAL HYPERTENSION: ICD-10-CM

## 2022-04-21 PROBLEM — N64.4 BREAST PAIN: Status: RESOLVED | Noted: 2020-02-18 | Resolved: 2022-04-21

## 2022-04-21 PROBLEM — M25.562 LEFT KNEE PAIN: Status: RESOLVED | Noted: 2022-02-23 | Resolved: 2022-04-21

## 2022-04-21 PROCEDURE — 3008F BODY MASS INDEX DOCD: CPT | Performed by: INTERNAL MEDICINE

## 2022-04-21 PROCEDURE — 99214 OFFICE O/P EST MOD 30 MIN: CPT | Performed by: INTERNAL MEDICINE

## 2022-04-21 PROCEDURE — 3075F SYST BP GE 130 - 139MM HG: CPT | Performed by: INTERNAL MEDICINE

## 2022-04-21 PROCEDURE — 3078F DIAST BP <80 MM HG: CPT | Performed by: INTERNAL MEDICINE

## 2022-04-21 NOTE — PATIENT INSTRUCTIONS
Please schedule your next appointment in early June for a pre-op exam.    Please do your blood tests, EKG, and x-ray after May 24, but before you see me for the pre-op exam.  You do not need to fast and you do not need an appointment.     Please do not take vitamins or supplements for 3 days prior to the blood test.

## 2022-04-21 NOTE — ASSESSMENT & PLAN NOTE
Patient walks with a cane. Surgery has been scheduled, so I ordered the preop testing needed.   Advised patient to schedule both the testing and her appointment with me for within 30 days of surgery, which would be after May 24

## 2022-04-29 RX ORDER — LEVOTHYROXINE SODIUM 137 UG/1
137 TABLET ORAL
Qty: 90 TABLET | Refills: 1 | Status: SHIPPED | OUTPATIENT
Start: 2022-04-29 | End: 2022-05-02

## 2022-04-29 NOTE — TELEPHONE ENCOUNTER
Please review refill protocol failed/ no protocol  Requested Prescriptions   Pending Prescriptions Disp Refills    LEVOTHYROXINE 137 MCG Oral Tab [Pharmacy Med Name: LEVOTHYROXINE SODIUM 137 MCG Tablet] 90 tablet 1     Sig: TAKE 1 TABLET BEFORE BREAKFAST        Thyroid Medication Protocol Failed - 4/28/2022  3:26 AM        Failed - Last TSH value is normal     Lab Results   Component Value Date    TSH 0.154 (L) 03/08/2022                 Passed - TSH in past 12 months        Passed - Appointment in past 12 or next 3 months

## 2022-05-02 ENCOUNTER — HOSPITAL ENCOUNTER (OUTPATIENT)
Dept: GENERAL RADIOLOGY | Facility: HOSPITAL | Age: 77
Discharge: HOME | End: 2022-05-02
Attending: INTERNAL MEDICINE
Payer: MEDICARE

## 2022-05-02 ENCOUNTER — EKG ENCOUNTER (OUTPATIENT)
Dept: LAB | Facility: HOSPITAL | Age: 77
End: 2022-05-02
Attending: INTERNAL MEDICINE
Payer: MEDICARE

## 2022-05-02 DIAGNOSIS — I10 ESSENTIAL HYPERTENSION: ICD-10-CM

## 2022-05-02 DIAGNOSIS — I67.9 CEREBROVASCULAR DISEASE: ICD-10-CM

## 2022-05-02 DIAGNOSIS — R07.81 RIB PAIN ON RIGHT SIDE: ICD-10-CM

## 2022-05-02 DIAGNOSIS — M17.12 OSTEOARTHRITIS OF LEFT KNEE, UNSPECIFIED OSTEOARTHRITIS TYPE: ICD-10-CM

## 2022-05-02 DIAGNOSIS — E03.9 ACQUIRED HYPOTHYROIDISM: ICD-10-CM

## 2022-05-02 LAB
ALBUMIN SERPL-MCNC: 3.6 G/DL (ref 3.4–5)
ALBUMIN/GLOB SERPL: 0.9 {RATIO} (ref 1–2)
ALP LIVER SERPL-CCNC: 59 U/L
ALT SERPL-CCNC: 20 U/L
ANION GAP SERPL CALC-SCNC: 4 MMOL/L (ref 0–18)
APTT PPP: 28.5 SECONDS (ref 23.3–35.6)
AST SERPL-CCNC: 16 U/L (ref 15–37)
BASOPHILS # BLD AUTO: 0.04 X10(3) UL (ref 0–0.2)
BASOPHILS NFR BLD AUTO: 0.6 %
BILIRUB SERPL-MCNC: 0.8 MG/DL (ref 0.1–2)
BILIRUB UR QL: NEGATIVE
BUN BLD-MCNC: 16 MG/DL (ref 7–18)
BUN/CREAT SERPL: 21.1 (ref 10–20)
CALCIUM BLD-MCNC: 9.5 MG/DL (ref 8.5–10.1)
CHLORIDE SERPL-SCNC: 104 MMOL/L (ref 98–112)
CO2 SERPL-SCNC: 33 MMOL/L (ref 21–32)
COLOR UR: YELLOW
CREAT BLD-MCNC: 0.76 MG/DL
DEPRECATED RDW RBC AUTO: 45.8 FL (ref 35.1–46.3)
EOSINOPHIL # BLD AUTO: 0.12 X10(3) UL (ref 0–0.7)
EOSINOPHIL NFR BLD AUTO: 1.7 %
ERYTHROCYTE [DISTWIDTH] IN BLOOD BY AUTOMATED COUNT: 12.5 % (ref 11–15)
FASTING STATUS PATIENT QL REPORTED: YES
GLOBULIN PLAS-MCNC: 4.2 G/DL (ref 2.8–4.4)
GLUCOSE BLD-MCNC: 87 MG/DL (ref 70–99)
GLUCOSE UR-MCNC: NEGATIVE MG/DL
HCT VFR BLD AUTO: 35.9 %
HGB BLD-MCNC: 11 G/DL
HGB UR QL STRIP.AUTO: NEGATIVE
IMM GRANULOCYTES # BLD AUTO: 0.01 X10(3) UL (ref 0–1)
IMM GRANULOCYTES NFR BLD: 0.1 %
INR BLD: 0.97 (ref 0.8–1.2)
KETONES UR-MCNC: NEGATIVE MG/DL
LYMPHOCYTES # BLD AUTO: 2.47 X10(3) UL (ref 1–4)
LYMPHOCYTES NFR BLD AUTO: 34.9 %
MCH RBC QN AUTO: 30.5 PG (ref 26–34)
MCHC RBC AUTO-ENTMCNC: 30.6 G/DL (ref 31–37)
MCV RBC AUTO: 99.4 FL
MONOCYTES # BLD AUTO: 0.71 X10(3) UL (ref 0.1–1)
MONOCYTES NFR BLD AUTO: 10 %
MRSA DNA SPEC QL NAA+PROBE: NEGATIVE
NEUTROPHILS # BLD AUTO: 3.73 X10 (3) UL (ref 1.5–7.7)
NEUTROPHILS # BLD AUTO: 3.73 X10(3) UL (ref 1.5–7.7)
NEUTROPHILS NFR BLD AUTO: 52.7 %
NITRITE UR QL STRIP.AUTO: NEGATIVE
OSMOLALITY SERPL CALC.SUM OF ELEC: 293 MOSM/KG (ref 275–295)
PH UR: 7 [PH] (ref 5–8)
PLATELET # BLD AUTO: 270 10(3)UL (ref 150–450)
POTASSIUM SERPL-SCNC: 4 MMOL/L (ref 3.5–5.1)
PROT SERPL-MCNC: 7.8 G/DL (ref 6.4–8.2)
PROT UR-MCNC: 30 MG/DL
PROTHROMBIN TIME: 12.9 SECONDS (ref 11.6–14.8)
RBC # BLD AUTO: 3.61 X10(6)UL
SODIUM SERPL-SCNC: 141 MMOL/L (ref 136–145)
SP GR UR STRIP: 1.02 (ref 1–1.03)
T3FREE SERPL-MCNC: 2.79 PG/ML (ref 2.4–4.2)
T4 FREE SERPL-MCNC: 1.5 NG/DL (ref 0.8–1.7)
TSI SER-ACNC: 0.05 MIU/ML (ref 0.36–3.74)
UROBILINOGEN UR STRIP-ACNC: <2
VIT C UR-MCNC: 20 MG/DL
WBC # BLD AUTO: 7.1 X10(3) UL (ref 4–11)
WBC #/AREA URNS AUTO: >50 /HPF

## 2022-05-02 PROCEDURE — 81001 URINALYSIS AUTO W/SCOPE: CPT

## 2022-05-02 PROCEDURE — 87186 SC STD MICRODIL/AGAR DIL: CPT

## 2022-05-02 PROCEDURE — 84443 ASSAY THYROID STIM HORMONE: CPT

## 2022-05-02 PROCEDURE — 84439 ASSAY OF FREE THYROXINE: CPT

## 2022-05-02 PROCEDURE — 93005 ELECTROCARDIOGRAM TRACING: CPT

## 2022-05-02 PROCEDURE — 85025 COMPLETE CBC W/AUTO DIFF WBC: CPT

## 2022-05-02 PROCEDURE — 71046 X-RAY EXAM CHEST 2 VIEWS: CPT | Performed by: INTERNAL MEDICINE

## 2022-05-02 PROCEDURE — 80053 COMPREHEN METABOLIC PANEL: CPT

## 2022-05-02 PROCEDURE — 93010 ELECTROCARDIOGRAM REPORT: CPT | Performed by: INTERNAL MEDICINE

## 2022-05-02 PROCEDURE — 84481 FREE ASSAY (FT-3): CPT

## 2022-05-02 PROCEDURE — 36415 COLL VENOUS BLD VENIPUNCTURE: CPT

## 2022-05-02 PROCEDURE — 85730 THROMBOPLASTIN TIME PARTIAL: CPT

## 2022-05-02 PROCEDURE — 87086 URINE CULTURE/COLONY COUNT: CPT

## 2022-05-02 PROCEDURE — 85610 PROTHROMBIN TIME: CPT

## 2022-05-02 PROCEDURE — 87641 MR-STAPH DNA AMP PROBE: CPT

## 2022-05-02 PROCEDURE — 87077 CULTURE AEROBIC IDENTIFY: CPT

## 2022-05-02 NOTE — PROGRESS NOTES
Dear Ms. Wilda Ma,  Your tests indicate that your thyroid dose is a little bit too high. I would like to decrease it because it is unhealthy for the heart and bones. I will send a new script to the pharmacy. Then I would like to recheck a non-fasting blood test in 3 months. Your other results are either normal or not significantly out of range to be of concern.    LVetrone

## 2022-05-03 ENCOUNTER — OFFICE VISIT (OUTPATIENT)
Dept: ENDOCRINOLOGY CLINIC | Facility: CLINIC | Age: 77
End: 2022-05-03
Payer: MEDICARE

## 2022-05-03 VITALS
RESPIRATION RATE: 16 BRPM | HEIGHT: 64 IN | SYSTOLIC BLOOD PRESSURE: 116 MMHG | HEART RATE: 75 BPM | WEIGHT: 130 LBS | DIASTOLIC BLOOD PRESSURE: 69 MMHG | BODY MASS INDEX: 22.2 KG/M2

## 2022-05-03 DIAGNOSIS — M81.0 AGE-RELATED OSTEOPOROSIS WITHOUT CURRENT PATHOLOGICAL FRACTURE: Primary | ICD-10-CM

## 2022-05-03 PROCEDURE — 3078F DIAST BP <80 MM HG: CPT | Performed by: INTERNAL MEDICINE

## 2022-05-03 PROCEDURE — 99214 OFFICE O/P EST MOD 30 MIN: CPT | Performed by: INTERNAL MEDICINE

## 2022-05-03 PROCEDURE — 3008F BODY MASS INDEX DOCD: CPT | Performed by: INTERNAL MEDICINE

## 2022-05-03 PROCEDURE — 3074F SYST BP LT 130 MM HG: CPT | Performed by: INTERNAL MEDICINE

## 2022-05-04 ENCOUNTER — TELEPHONE (OUTPATIENT)
Dept: GASTROENTEROLOGY | Facility: CLINIC | Age: 77
End: 2022-05-04

## 2022-05-04 NOTE — TELEPHONE ENCOUNTER
Contacted patient who states someone called her and told her to reach out to our office since she had the wrong medication for prep. Patient states she picked up the big jug after her appt with Dr. Divya Celis, noted this is the correct medication. Prep instructions sent via [employer] as requested.

## 2022-05-04 NOTE — PROGRESS NOTES
Please call pt: Your urine culture showed that you have a mild urinary tract infection. I will send an antibiotic to your pharmacy. We will repeat the urine test at your next office visit.

## 2022-05-06 ENCOUNTER — TELEPHONE (OUTPATIENT)
Dept: INTERNAL MEDICINE CLINIC | Facility: CLINIC | Age: 77
End: 2022-05-06

## 2022-05-06 RX ORDER — CEFDINIR 300 MG/1
300 CAPSULE ORAL 2 TIMES DAILY
Qty: 14 CAPSULE | Refills: 0 | Status: SHIPPED | OUTPATIENT
Start: 2022-05-06 | End: 2022-05-13

## 2022-05-06 NOTE — TELEPHONE ENCOUNTER
RN - Please call pharmacy, re: Cefixime. Should not be discontinued. Please assist with issue so patient can get medication. Thank you    Received call from patient. She is quite upset. States the pharmacy says her medication Cefixime was discontinued. Chart reviewed - order was not discontinued. Advised her to speak with pharmacy to clarify reason they have it discontinued. She became upset and said that she just spoke with them, asking us to call them.

## 2022-05-06 NOTE — TELEPHONE ENCOUNTER
Spoke to pharmacy. Medication is not available from supplier at this time. Pharmacy has generic Omnicef 300mg available. Please advise. Thanks. Patient was contacted with information and will await call back with provider medication recommendation.

## 2022-05-07 ENCOUNTER — LAB ENCOUNTER (OUTPATIENT)
Dept: LAB | Facility: HOSPITAL | Age: 77
End: 2022-05-07
Attending: INTERNAL MEDICINE
Payer: MEDICARE

## 2022-05-07 DIAGNOSIS — Z20.822 ENCOUNTER FOR PREOPERATIVE SCREENING LABORATORY TESTING FOR COVID-19 VIRUS: ICD-10-CM

## 2022-05-07 DIAGNOSIS — Z01.812 ENCOUNTER FOR PREOPERATIVE SCREENING LABORATORY TESTING FOR COVID-19 VIRUS: ICD-10-CM

## 2022-05-08 LAB — SARS-COV-2 RNA RESP QL NAA+PROBE: NOT DETECTED

## 2022-05-10 ENCOUNTER — HOSPITAL ENCOUNTER (OUTPATIENT)
Facility: HOSPITAL | Age: 77
Setting detail: HOSPITAL OUTPATIENT SURGERY
Discharge: HOME | End: 2022-05-10
Attending: INTERNAL MEDICINE | Admitting: INTERNAL MEDICINE
Payer: MEDICARE

## 2022-05-10 ENCOUNTER — HOSPITAL ENCOUNTER (OUTPATIENT)
Dept: BONE DENSITY | Age: 77
Discharge: HOME | End: 2022-05-10
Attending: INTERNAL MEDICINE
Payer: MEDICARE

## 2022-05-10 ENCOUNTER — ANESTHESIA (OUTPATIENT)
Dept: ENDOSCOPY | Facility: HOSPITAL | Age: 77
End: 2022-05-10
Payer: MEDICARE

## 2022-05-10 ENCOUNTER — ANESTHESIA EVENT (OUTPATIENT)
Dept: ENDOSCOPY | Facility: HOSPITAL | Age: 77
End: 2022-05-10
Payer: MEDICARE

## 2022-05-10 VITALS
BODY MASS INDEX: 24.54 KG/M2 | HEIGHT: 60 IN | HEART RATE: 71 BPM | OXYGEN SATURATION: 99 % | SYSTOLIC BLOOD PRESSURE: 139 MMHG | DIASTOLIC BLOOD PRESSURE: 73 MMHG | TEMPERATURE: 98 F | WEIGHT: 125 LBS | RESPIRATION RATE: 13 BRPM

## 2022-05-10 DIAGNOSIS — R10.13 EPIGASTRIC PAIN: ICD-10-CM

## 2022-05-10 DIAGNOSIS — Z12.11 SCREEN FOR COLON CANCER: ICD-10-CM

## 2022-05-10 DIAGNOSIS — M81.0 AGE-RELATED OSTEOPOROSIS WITHOUT CURRENT PATHOLOGICAL FRACTURE: ICD-10-CM

## 2022-05-10 DIAGNOSIS — Z20.822 ENCOUNTER FOR PREOPERATIVE SCREENING LABORATORY TESTING FOR COVID-19 VIRUS: Primary | ICD-10-CM

## 2022-05-10 DIAGNOSIS — Z01.812 ENCOUNTER FOR PREOPERATIVE SCREENING LABORATORY TESTING FOR COVID-19 VIRUS: Primary | ICD-10-CM

## 2022-05-10 DIAGNOSIS — R63.4 WEIGHT LOSS: ICD-10-CM

## 2022-05-10 PROCEDURE — 0DJD8ZZ INSPECTION OF LOWER INTESTINAL TRACT, VIA NATURAL OR ARTIFICIAL OPENING ENDOSCOPIC: ICD-10-PCS | Performed by: INTERNAL MEDICINE

## 2022-05-10 PROCEDURE — G0121 COLON CA SCRN NOT HI RSK IND: HCPCS | Performed by: INTERNAL MEDICINE

## 2022-05-10 PROCEDURE — 77080 DXA BONE DENSITY AXIAL: CPT | Performed by: INTERNAL MEDICINE

## 2022-05-10 PROCEDURE — 43239 EGD BIOPSY SINGLE/MULTIPLE: CPT | Performed by: INTERNAL MEDICINE

## 2022-05-10 PROCEDURE — 0DB68ZX EXCISION OF STOMACH, VIA NATURAL OR ARTIFICIAL OPENING ENDOSCOPIC, DIAGNOSTIC: ICD-10-PCS | Performed by: INTERNAL MEDICINE

## 2022-05-10 PROCEDURE — 0DB98ZX EXCISION OF DUODENUM, VIA NATURAL OR ARTIFICIAL OPENING ENDOSCOPIC, DIAGNOSTIC: ICD-10-PCS | Performed by: INTERNAL MEDICINE

## 2022-05-10 RX ORDER — SODIUM CHLORIDE, SODIUM LACTATE, POTASSIUM CHLORIDE, CALCIUM CHLORIDE 600; 310; 30; 20 MG/100ML; MG/100ML; MG/100ML; MG/100ML
INJECTION, SOLUTION INTRAVENOUS CONTINUOUS
Status: DISCONTINUED | OUTPATIENT
Start: 2022-05-10 | End: 2022-05-10

## 2022-05-10 RX ORDER — LIDOCAINE HYDROCHLORIDE 10 MG/ML
INJECTION, SOLUTION EPIDURAL; INFILTRATION; INTRACAUDAL; PERINEURAL AS NEEDED
Status: DISCONTINUED | OUTPATIENT
Start: 2022-05-10 | End: 2022-05-10 | Stop reason: SURG

## 2022-05-10 RX ADMIN — LIDOCAINE HYDROCHLORIDE 50 MG: 10 INJECTION, SOLUTION EPIDURAL; INFILTRATION; INTRACAUDAL; PERINEURAL at 10:03:00

## 2022-05-10 RX ADMIN — SODIUM CHLORIDE, SODIUM LACTATE, POTASSIUM CHLORIDE, CALCIUM CHLORIDE: 600; 310; 30; 20 INJECTION, SOLUTION INTRAVENOUS at 10:03:00

## 2022-05-10 RX ADMIN — SODIUM CHLORIDE, SODIUM LACTATE, POTASSIUM CHLORIDE, CALCIUM CHLORIDE: 600; 310; 30; 20 INJECTION, SOLUTION INTRAVENOUS at 10:32:00

## 2022-05-10 NOTE — ANESTHESIA POSTPROCEDURE EVALUATION
Patient: Arnaldo Leach    Procedure Summary     Date: 05/10/22 Room / Location: [facility] ENDOSCOPY 01 / [facility] ENDOSCOPY    Anesthesia Start: 1003 Anesthesia Stop:     Procedures:       COLONOSCOPY (N/A )      ESOPHAGOGASTRODUODENOSCOPY (EGD) (N/A ) Diagnosis:       Screen for colon cancer      Weight loss      Epigastric pain      (normal egd, hemorrhoids)    Surgeons: Maria Isabel Malagon MD Anesthesiologist: Shanika Gonzalez., SYDNEE    Anesthesia Type: MAC ASA Status: 2          Anesthesia Type: MAC    Vitals Value Taken Time   /76 05/10/22 1037   Temp  05/10/22 1038   Pulse 89 05/10/22 1037   Resp 18 05/10/22 1037   SpO2 99 % 05/10/22 1037       [facility] AN Post Evaluation:   Patient Evaluated in PACU  Patient Participation: complete - patient participated  Level of Consciousness: awake  Pain Score: 0  Pain Management: adequate  Airway Patency:patent  Dental exam unchanged from preop  Yes    Cardiovascular Status: acceptable and hemodynamically stable  Respiratory Status: acceptable, room air and nonlabored ventilation  Postoperative Hydration acceptable      Melissa Rodriguez  5/10/2022 10:38 AM

## 2022-05-12 ENCOUNTER — HOSPITAL ENCOUNTER (OUTPATIENT)
Dept: MRI IMAGING | Facility: HOSPITAL | Age: 77
Discharge: HOME | End: 2022-05-12
Attending: ORTHOPAEDIC SURGERY
Payer: MEDICARE

## 2022-05-12 DIAGNOSIS — M17.12 PRIMARY OSTEOARTHRITIS OF LEFT KNEE: ICD-10-CM

## 2022-05-12 DIAGNOSIS — M25.562 CHRONIC PAIN OF LEFT KNEE: ICD-10-CM

## 2022-05-12 DIAGNOSIS — G89.29 CHRONIC PAIN OF LEFT KNEE: ICD-10-CM

## 2022-05-12 PROCEDURE — 73721 MRI JNT OF LWR EXTRE W/O DYE: CPT | Performed by: ORTHOPAEDIC SURGERY

## 2022-05-18 DIAGNOSIS — M81.0 AGE-RELATED OSTEOPOROSIS WITHOUT CURRENT PATHOLOGICAL FRACTURE: Primary | ICD-10-CM

## 2022-05-19 DIAGNOSIS — I10 ESSENTIAL HYPERTENSION: ICD-10-CM

## 2022-05-19 RX ORDER — HYDROCHLOROTHIAZIDE 12.5 MG/1
CAPSULE, GELATIN COATED ORAL
Qty: 90 CAPSULE | Refills: 1 | Status: SHIPPED | OUTPATIENT
Start: 2022-05-19

## 2022-05-19 RX ORDER — ZOLEDRONIC ACID 5 MG/100ML
5 INJECTION, SOLUTION INTRAVENOUS ONCE
Qty: 100 ML | Refills: 0 | Status: SHIPPED | OUTPATIENT
Start: 2022-05-19 | End: 2022-05-19

## 2022-05-19 RX ORDER — POTASSIUM CHLORIDE 750 MG/1
10 TABLET, EXTENDED RELEASE ORAL DAILY
Qty: 90 TABLET | Refills: 1 | Status: SHIPPED | OUTPATIENT
Start: 2022-05-19

## 2022-05-19 NOTE — TELEPHONE ENCOUNTER
Dr. Sheila Valdovinos to patient to relay message below - patient agreeable to repeating Reclast  Patient stated she is having knee surgery on 6/23/22    RX for Reclast pended for approval and printing   Reclast infusion therapy order placed on your desk for signature  Thanks

## 2022-05-19 NOTE — TELEPHONE ENCOUNTER
Hi!    Please let patient know that I have reviewed her DXA scan and we see improvement in bone mineral density and I do recommend for her to another dose of Reclast. Please find out if she is agreeable to this plan. Thank you!

## 2022-05-19 NOTE — TELEPHONE ENCOUNTER
Refill passed per [facility] protocol.      Requested Prescriptions   Pending Prescriptions Disp Refills    HYDROCHLOROTHIAZIDE 12.5 MG Oral Cap [Pharmacy Med Name: HYDROCHLOROTHIAZIDE 12.5 MG Capsule] 90 capsule 1     Sig: TAKE 1 CAPSULE EVERY DAY        Hypertensive Medications Protocol Passed - 5/19/2022  2:46 AM        Passed - CMP or BMP in past 12 months        Passed - Appointment in past 6 or next 3 months        Passed - GFR  > 50     Lab Results   Component Value Date    GFRAA 88 05/02/2022                    POTASSIUM CHLORIDE 10 MEQ Oral Tab CR [Pharmacy Med Name: POTASSIUM CHLORIDE ER 10 MEQ Tablet Extended Release] 90 tablet 1     Sig: TAKE 1 TABLET EVERY DAY        There is no refill protocol information for this order            Future Appointments         Provider Department Appt Notes    In 2 weeks Aashish Lopez MD [employer], [facility], [facility] 6 wk f/u    In 1 month [facility], [address] [place] [address], [address], [place] 1st POV Left total Knee Arthroplasty    In 3 months Aashish Lopez MD [facility], [address], [place] 6 [facility]             Recent Outpatient Visits              2 weeks ago Age-related osteoporosis without current pathological fracture    [employer], [facility] Endocrinology Anthony Valenzuela MD    Office Visit    4 weeks ago Seborrheic keratosis    [address] [facility], Ganesh Butler MD    Office Visit    1 month ago Primary osteoarthritis of left knee    [facility] Juan Morillo [facility]    Office Visit    2 months ago Encounter for [employer] annual wellness exam    [address] [facility], Ganesh Butler MD    Office Visit    2 months ago Chronic pain of left knee    [address] [facility], Og Harry, APRN    Office Visit

## 2022-05-19 NOTE — TELEPHONE ENCOUNTER
Please review; protocol failed/no protocol.      Requested Prescriptions   Pending Prescriptions Disp Refills    POTASSIUM CHLORIDE 10 MEQ Oral Tab CR [Pharmacy Med Name: POTASSIUM CHLORIDE ER 10 MEQ Tablet Extended Release] 90 tablet 1     Sig: TAKE 1 TABLET EVERY DAY        There is no refill protocol information for this order       Signed Prescriptions Disp Refills    HYDROCHLOROTHIAZIDE 12.5 MG Oral Cap 90 capsule 1     Sig: TAKE 1 CAPSULE EVERY DAY        Hypertensive Medications Protocol Passed - 5/19/2022  2:46 AM        Passed - CMP or BMP in past 12 months        Passed - Appointment in past 6 or next 3 months        Passed - GFR  > 50     Lab Results   Component Value Date    GFRAA 88 05/02/2022                        Recent Outpatient Visits              2 weeks ago Age-related osteoporosis without current pathological fracture    [place], [facility] Ray Ingram MD    Office Visit    4 weeks ago Seborrheic keratosis    [facility], Lillie Connolly MD    Office Visit    1 month ago Primary osteoarthritis of left knee    [facility] [place] [facility] Susanne Parikh [facility]    Office Visit    2 months ago Encounter for [facility] annual wellness exam    [address] [facility], Lillie Connolly MD    Office Visit    2 months ago Chronic pain of left knee    [address] [facility], Og Harry, AMIRAH    Office Visit             Future Appointments         Provider Department Appt Notes    In 2 weeks Karen Groves MD [place], [employer], [address] 6 wk f/u    In 1 month [facility] Rangel [facility] [place] [facility], Naveen Osei 1st POV Left total Knee Arthroplasty    In 3 months Karen Groves MD [employer], [place] 6 MONTH FOLLOW UP

## 2022-05-23 ENCOUNTER — TELEPHONE (OUTPATIENT)
Dept: GASTROENTEROLOGY | Facility: CLINIC | Age: 77
End: 2022-05-23

## 2022-05-23 ENCOUNTER — OFFICE VISIT (OUTPATIENT)
Dept: INTERNAL MEDICINE CLINIC | Facility: CLINIC | Age: 77
End: 2022-05-23
Payer: MEDICARE

## 2022-05-23 VITALS
HEART RATE: 72 BPM | BODY MASS INDEX: 24.76 KG/M2 | HEIGHT: 60 IN | DIASTOLIC BLOOD PRESSURE: 75 MMHG | WEIGHT: 126.13 LBS | SYSTOLIC BLOOD PRESSURE: 124 MMHG

## 2022-05-23 DIAGNOSIS — M25.559 HIP PAIN: Primary | ICD-10-CM

## 2022-05-23 PROCEDURE — 99214 OFFICE O/P EST MOD 30 MIN: CPT | Performed by: NURSE PRACTITIONER

## 2022-05-23 PROCEDURE — 3074F SYST BP LT 130 MM HG: CPT | Performed by: NURSE PRACTITIONER

## 2022-05-23 PROCEDURE — 3078F DIAST BP <80 MM HG: CPT | Performed by: NURSE PRACTITIONER

## 2022-05-23 PROCEDURE — 3008F BODY MASS INDEX DOCD: CPT | Performed by: NURSE PRACTITIONER

## 2022-05-23 NOTE — ASSESSMENT & PLAN NOTE
Right hip pain  67year-old pleasant female complaining of right hip pain. She is having surgery on her left knee on June 23, 2022, she uses a cane and most likely has been compensating using her right leg to bear weight. Now she has pain in her right groin and right hip 8 out of 10. Pain is on the outer aspect of the right hip which radiates into the right groin. This is most likely bursitis of her right hip. Plan  1) Ice areas of discomfort 15 to 20  minutes four times per day. 2) ibuprofen 400 mg every 8 hours x5 days.     Patient to call if symptoms do not improve

## 2022-05-23 NOTE — PATIENT INSTRUCTIONS
Ibuprophen two tablets every eight hours x five days with food. Ice areas of discomfort 15 to 20  minutes four times per day.

## 2022-05-23 NOTE — TELEPHONE ENCOUNTER
I mailed out EGD results letter to pt  Updated health maintenance    EGD done 5/10/2022-No EGD Recall Entered

## 2022-05-24 RX ORDER — ZOLEDRONIC ACID 5 MG/100ML
5 INJECTION, SOLUTION INTRAVENOUS ONCE
OUTPATIENT
Start: 2022-05-24

## 2022-05-26 ENCOUNTER — APPOINTMENT (OUTPATIENT)
Dept: GENERAL RADIOLOGY | Facility: HOSPITAL | Age: 77
End: 2022-05-26
Attending: EMERGENCY MEDICINE
Payer: MEDICARE

## 2022-05-26 ENCOUNTER — APPOINTMENT (OUTPATIENT)
Dept: CT IMAGING | Facility: HOSPITAL | Age: 77
End: 2022-05-26
Attending: EMERGENCY MEDICINE
Payer: MEDICARE

## 2022-05-26 ENCOUNTER — HOSPITAL ENCOUNTER (EMERGENCY)
Facility: HOSPITAL | Age: 77
Discharge: HOME | End: 2022-05-26
Attending: EMERGENCY MEDICINE
Payer: MEDICARE

## 2022-05-26 VITALS
HEIGHT: 65 IN | DIASTOLIC BLOOD PRESSURE: 69 MMHG | RESPIRATION RATE: 18 BRPM | SYSTOLIC BLOOD PRESSURE: 122 MMHG | WEIGHT: 125 LBS | BODY MASS INDEX: 20.83 KG/M2 | HEART RATE: 96 BPM | OXYGEN SATURATION: 97 % | TEMPERATURE: 98 F

## 2022-05-26 DIAGNOSIS — R10.9 RIGHT FLANK PAIN: Primary | ICD-10-CM

## 2022-05-26 LAB
ALBUMIN SERPL-MCNC: 4 G/DL (ref 3.4–5)
ALP LIVER SERPL-CCNC: 51 U/L
ALT SERPL-CCNC: 18 U/L
ANION GAP SERPL CALC-SCNC: 7 MMOL/L (ref 0–18)
AST SERPL-CCNC: 20 U/L (ref 15–37)
BASOPHILS # BLD AUTO: 0.03 X10(3) UL (ref 0–0.2)
BASOPHILS NFR BLD AUTO: 0.4 %
BILIRUB DIRECT SERPL-MCNC: 0.2 MG/DL (ref 0–0.2)
BILIRUB SERPL-MCNC: 1.2 MG/DL (ref 0.1–2)
BILIRUB UR QL: NEGATIVE
BUN BLD-MCNC: 14 MG/DL (ref 7–18)
BUN/CREAT SERPL: 18.9 (ref 10–20)
CALCIUM BLD-MCNC: 9.6 MG/DL (ref 8.5–10.1)
CHLORIDE SERPL-SCNC: 100 MMOL/L (ref 98–112)
CLARITY UR: CLEAR
CO2 SERPL-SCNC: 30 MMOL/L (ref 21–32)
COLOR UR: YELLOW
CREAT BLD-MCNC: 0.74 MG/DL
DEPRECATED RDW RBC AUTO: 43.4 FL (ref 35.1–46.3)
EOSINOPHIL # BLD AUTO: 0.02 X10(3) UL (ref 0–0.7)
EOSINOPHIL NFR BLD AUTO: 0.3 %
ERYTHROCYTE [DISTWIDTH] IN BLOOD BY AUTOMATED COUNT: 12.3 % (ref 11–15)
GLUCOSE BLD-MCNC: 87 MG/DL (ref 70–99)
GLUCOSE UR-MCNC: NEGATIVE MG/DL
HCT VFR BLD AUTO: 34.3 %
HGB BLD-MCNC: 11 G/DL
IMM GRANULOCYTES # BLD AUTO: 0.01 X10(3) UL (ref 0–1)
IMM GRANULOCYTES NFR BLD: 0.1 %
KETONES UR-MCNC: NEGATIVE MG/DL
LEUKOCYTE ESTERASE UR QL STRIP.AUTO: NEGATIVE
LIPASE SERPL-CCNC: 119 U/L (ref 73–393)
LYMPHOCYTES # BLD AUTO: 2.27 X10(3) UL (ref 1–4)
LYMPHOCYTES NFR BLD AUTO: 34 %
MCH RBC QN AUTO: 30.6 PG (ref 26–34)
MCHC RBC AUTO-ENTMCNC: 32.1 G/DL (ref 31–37)
MCV RBC AUTO: 95.5 FL
MONOCYTES # BLD AUTO: 0.56 X10(3) UL (ref 0.1–1)
MONOCYTES NFR BLD AUTO: 8.4 %
NEUTROPHILS # BLD AUTO: 3.79 X10 (3) UL (ref 1.5–7.7)
NEUTROPHILS # BLD AUTO: 3.79 X10(3) UL (ref 1.5–7.7)
NEUTROPHILS NFR BLD AUTO: 56.8 %
NITRITE UR QL STRIP.AUTO: NEGATIVE
OSMOLALITY SERPL CALC.SUM OF ELEC: 284 MOSM/KG (ref 275–295)
PH UR: 5 [PH] (ref 5–8)
PLATELET # BLD AUTO: 247 10(3)UL (ref 150–450)
POTASSIUM SERPL-SCNC: 3.4 MMOL/L (ref 3.5–5.1)
PROT SERPL-MCNC: 7.9 G/DL (ref 6.4–8.2)
PROT UR-MCNC: NEGATIVE MG/DL
RBC # BLD AUTO: 3.59 X10(6)UL
SODIUM SERPL-SCNC: 137 MMOL/L (ref 136–145)
SP GR UR STRIP: 1.01 (ref 1–1.03)
UROBILINOGEN UR STRIP-ACNC: <2
VIT C UR-MCNC: NEGATIVE MG/DL
WBC # BLD AUTO: 6.7 X10(3) UL (ref 4–11)

## 2022-05-26 PROCEDURE — 71045 X-RAY EXAM CHEST 1 VIEW: CPT | Performed by: EMERGENCY MEDICINE

## 2022-05-26 PROCEDURE — 74177 CT ABD & PELVIS W/CONTRAST: CPT | Performed by: EMERGENCY MEDICINE

## 2022-05-26 PROCEDURE — 83690 ASSAY OF LIPASE: CPT | Performed by: EMERGENCY MEDICINE

## 2022-05-26 PROCEDURE — 80076 HEPATIC FUNCTION PANEL: CPT | Performed by: EMERGENCY MEDICINE

## 2022-05-26 PROCEDURE — 85025 COMPLETE CBC W/AUTO DIFF WBC: CPT | Performed by: EMERGENCY MEDICINE

## 2022-05-26 PROCEDURE — 99284 EMERGENCY DEPT VISIT MOD MDM: CPT

## 2022-05-26 PROCEDURE — 96374 THER/PROPH/DIAG INJ IV PUSH: CPT

## 2022-05-26 PROCEDURE — 81001 URINALYSIS AUTO W/SCOPE: CPT | Performed by: EMERGENCY MEDICINE

## 2022-05-26 PROCEDURE — 80048 BASIC METABOLIC PNL TOTAL CA: CPT | Performed by: EMERGENCY MEDICINE

## 2022-05-26 RX ORDER — KETOROLAC TROMETHAMINE 15 MG/ML
15 INJECTION, SOLUTION INTRAMUSCULAR; INTRAVENOUS ONCE
Status: COMPLETED | OUTPATIENT
Start: 2022-05-26 | End: 2022-05-26

## 2022-05-26 RX ORDER — LIDOCAINE 50 MG/G
1 PATCH TOPICAL EVERY 24 HOURS
Qty: 14 PATCH | Refills: 0 | Status: SHIPPED | OUTPATIENT
Start: 2022-05-26 | End: 2022-06-09

## 2022-05-26 NOTE — ED INITIAL ASSESSMENT (HPI)
Pt to ED with c/o right side back pain that radiates to right upper ribs x5 days. Pt went to PCP Monday was told she pulled a muscle and treat with ice packs. Pt denies CP.

## 2022-06-07 ENCOUNTER — TELEPHONE (OUTPATIENT)
Dept: INTERNAL MEDICINE CLINIC | Facility: CLINIC | Age: 77
End: 2022-06-07

## 2022-06-07 ENCOUNTER — OFFICE VISIT (OUTPATIENT)
Dept: INTERNAL MEDICINE CLINIC | Facility: CLINIC | Age: 77
End: 2022-06-07
Payer: MEDICARE

## 2022-06-07 ENCOUNTER — LAB ENCOUNTER (OUTPATIENT)
Dept: LAB | Facility: HOSPITAL | Age: 77
End: 2022-06-07
Attending: INTERNAL MEDICINE
Payer: MEDICARE

## 2022-06-07 VITALS
HEART RATE: 69 BPM | SYSTOLIC BLOOD PRESSURE: 111 MMHG | HEIGHT: 65 IN | WEIGHT: 130.19 LBS | DIASTOLIC BLOOD PRESSURE: 62 MMHG | RESPIRATION RATE: 16 BRPM | BODY MASS INDEX: 21.69 KG/M2

## 2022-06-07 DIAGNOSIS — M17.11 OSTEOARTHRITIS OF RIGHT KNEE, UNSPECIFIED OSTEOARTHRITIS TYPE: ICD-10-CM

## 2022-06-07 DIAGNOSIS — M79.89 LEFT LEG SWELLING: ICD-10-CM

## 2022-06-07 DIAGNOSIS — I10 ESSENTIAL HYPERTENSION: ICD-10-CM

## 2022-06-07 DIAGNOSIS — Z01.818 PRE-OP EXAM: Primary | ICD-10-CM

## 2022-06-07 LAB
ANION GAP SERPL CALC-SCNC: 5 MMOL/L (ref 0–18)
BILIRUB UR QL: NEGATIVE
BUN BLD-MCNC: 11 MG/DL (ref 7–18)
BUN/CREAT SERPL: 13.8 (ref 10–20)
CALCIUM BLD-MCNC: 9.8 MG/DL (ref 8.5–10.1)
CHLORIDE SERPL-SCNC: 103 MMOL/L (ref 98–112)
CO2 SERPL-SCNC: 32 MMOL/L (ref 21–32)
COLOR UR: YELLOW
CREAT BLD-MCNC: 0.8 MG/DL
FASTING STATUS PATIENT QL REPORTED: NO
GLUCOSE BLD-MCNC: 85 MG/DL (ref 70–99)
GLUCOSE UR-MCNC: NEGATIVE MG/DL
HGB UR QL STRIP.AUTO: NEGATIVE
KETONES UR-MCNC: NEGATIVE MG/DL
NITRITE UR QL STRIP.AUTO: NEGATIVE
OSMOLALITY SERPL CALC.SUM OF ELEC: 289 MOSM/KG (ref 275–295)
PH UR: 7 [PH] (ref 5–8)
POTASSIUM SERPL-SCNC: 3.8 MMOL/L (ref 3.5–5.1)
PROT UR-MCNC: 30 MG/DL
SODIUM SERPL-SCNC: 140 MMOL/L (ref 136–145)
SP GR UR STRIP: 1.02 (ref 1–1.03)
UROBILINOGEN UR STRIP-ACNC: <2
VIT C UR-MCNC: 40 MG/DL

## 2022-06-07 PROCEDURE — 99215 OFFICE O/P EST HI 40 MIN: CPT | Performed by: INTERNAL MEDICINE

## 2022-06-07 PROCEDURE — 1125F AMNT PAIN NOTED PAIN PRSNT: CPT | Performed by: INTERNAL MEDICINE

## 2022-06-07 PROCEDURE — 81001 URINALYSIS AUTO W/SCOPE: CPT | Performed by: INTERNAL MEDICINE

## 2022-06-07 PROCEDURE — 87077 CULTURE AEROBIC IDENTIFY: CPT | Performed by: INTERNAL MEDICINE

## 2022-06-07 PROCEDURE — 87086 URINE CULTURE/COLONY COUNT: CPT | Performed by: INTERNAL MEDICINE

## 2022-06-07 PROCEDURE — 3008F BODY MASS INDEX DOCD: CPT | Performed by: INTERNAL MEDICINE

## 2022-06-07 PROCEDURE — 87186 SC STD MICRODIL/AGAR DIL: CPT | Performed by: INTERNAL MEDICINE

## 2022-06-07 PROCEDURE — 36415 COLL VENOUS BLD VENIPUNCTURE: CPT

## 2022-06-07 PROCEDURE — 3074F SYST BP LT 130 MM HG: CPT | Performed by: INTERNAL MEDICINE

## 2022-06-07 PROCEDURE — 3078F DIAST BP <80 MM HG: CPT | Performed by: INTERNAL MEDICINE

## 2022-06-07 PROCEDURE — 80048 BASIC METABOLIC PNL TOTAL CA: CPT

## 2022-06-07 NOTE — ASSESSMENT & PLAN NOTE
EKG:  NSR. No acute abnormalities. CXR:  No pulmonary abnormalities. CBC unremarkable for surgery. BMP showed mild hypokalemia. Will repeat. Cleared for surgery pending lower extremity venous Doppler and repeat BMP.

## 2022-06-07 NOTE — ASSESSMENT & PLAN NOTE
This may be due to more walking than patient typically does, or dietary indiscretion. However will check lower extremity venous Doppler prior to orthopedic surgery.

## 2022-06-07 NOTE — TELEPHONE ENCOUNTER
Spoke with patient--saw Dr. Sonali Soliz in office today--US ordered to complete prior to 6/23/2022 Left total knee arthroplasty, but soonest available US appt is 6/29/2022. Spoke with Maurizio Wong at MD line scheduling for assist ([id number])--patient scheduled for 6//18/2022 in [place] location--address provided by Maurizio Wong. No further questions/concerns at this time.       Future Appointments   Date Time Provider Carmen Chapman   6/18/2022  7:30 PM [facility]   7/5/2022  8:20 AM Michelle Cruz MD [facility]   9/8/2022  8:30 AM Grover Chandra MD [facility]

## 2022-06-09 NOTE — PROGRESS NOTES
Please call pt: She has a urinary tract infections. I will send antibiotics to her pharmacy. I want to repeat the urine culture in 1 week. She should start the antibiotics right away and do the urine test next Friday, June 16, so that it will be cleared up in time for her surgery. I sent the medication to [facility]yan[facility] at [facility]. [facility] [place].

## 2022-06-14 ENCOUNTER — LAB ENCOUNTER (OUTPATIENT)
Dept: LAB | Facility: HOSPITAL | Age: 77
End: 2022-06-14
Attending: INTERNAL MEDICINE
Payer: MEDICARE

## 2022-06-14 DIAGNOSIS — R82.71 BACTERIA IN URINE: ICD-10-CM

## 2022-06-14 LAB
BILIRUB UR QL: NEGATIVE
COLOR UR: YELLOW
GLUCOSE UR-MCNC: NEGATIVE MG/DL
HGB UR QL STRIP.AUTO: NEGATIVE
KETONES UR-MCNC: NEGATIVE MG/DL
NITRITE UR QL STRIP.AUTO: NEGATIVE
PH UR: 6 [PH] (ref 5–8)
PROT UR-MCNC: NEGATIVE MG/DL
SP GR UR STRIP: 1.01 (ref 1–1.03)
UROBILINOGEN UR STRIP-ACNC: <2
VIT C UR-MCNC: NEGATIVE MG/DL

## 2022-06-14 PROCEDURE — 81001 URINALYSIS AUTO W/SCOPE: CPT

## 2022-06-14 PROCEDURE — 87086 URINE CULTURE/COLONY COUNT: CPT

## 2022-06-18 ENCOUNTER — HOSPITAL ENCOUNTER (OUTPATIENT)
Dept: ULTRASOUND IMAGING | Age: 77
Discharge: HOME | End: 2022-06-18
Attending: INTERNAL MEDICINE
Payer: MEDICARE

## 2022-06-18 DIAGNOSIS — M79.89 LEFT LEG SWELLING: ICD-10-CM

## 2022-06-18 PROCEDURE — 93970 EXTREMITY STUDY: CPT | Performed by: INTERNAL MEDICINE

## 2022-06-20 ENCOUNTER — LAB ENCOUNTER (OUTPATIENT)
Dept: LAB | Facility: HOSPITAL | Age: 77
End: 2022-06-20
Attending: ORTHOPAEDIC SURGERY
Payer: MEDICARE

## 2022-06-20 DIAGNOSIS — Z01.818 PRE-OP TESTING: ICD-10-CM

## 2022-06-20 LAB
ANTIBODY SCREEN: NEGATIVE
MRSA DNA SPEC QL NAA+PROBE: NEGATIVE
RH BLOOD TYPE: POSITIVE
RH BLOOD TYPE: POSITIVE
SARS-COV-2 RNA RESP QL NAA+PROBE: NOT DETECTED

## 2022-06-20 PROCEDURE — 86900 BLOOD TYPING SEROLOGIC ABO: CPT

## 2022-06-20 PROCEDURE — 87641 MR-STAPH DNA AMP PROBE: CPT

## 2022-06-20 PROCEDURE — 86850 RBC ANTIBODY SCREEN: CPT

## 2022-06-20 PROCEDURE — 86901 BLOOD TYPING SEROLOGIC RH(D): CPT

## 2022-06-20 PROCEDURE — 36415 COLL VENOUS BLD VENIPUNCTURE: CPT

## 2022-06-21 ENCOUNTER — ANESTHESIA EVENT (OUTPATIENT)
Dept: SURGERY | Facility: HOSPITAL | Age: 77
End: 2022-06-21
Payer: MEDICARE

## 2022-06-21 ENCOUNTER — TELEPHONE (OUTPATIENT)
Dept: ORTHOPEDICS CLINIC | Facility: CLINIC | Age: 77
End: 2022-06-21

## 2022-06-22 ENCOUNTER — TELEPHONE (OUTPATIENT)
Dept: ORTHOPEDICS CLINIC | Facility: CLINIC | Age: 77
End: 2022-06-22

## 2022-06-22 NOTE — TELEPHONE ENCOUNTER
I've tried calling [place] twice today [phone] [facility]. Called her sister Bel Gagnon at [phone] VM was not set up and I couldn't leave a message.   Called her son Shelton Elizondo at [phone] [place]

## 2022-06-22 NOTE — TELEPHONE ENCOUNTER
Tali Fernandez from pre admission states they have been unable to contact patient about surgery scheduled tomorrow.  Please advise

## 2022-06-22 NOTE — TELEPHONE ENCOUNTER
Edie Hoover called this morning and was informed that she would be receiving a call from [facility] with surgery time and instructions. I sent a message through [facility] to inform her that we were trying to reach her.

## 2022-06-23 ENCOUNTER — APPOINTMENT (OUTPATIENT)
Dept: GENERAL RADIOLOGY | Facility: HOSPITAL | Age: 77
End: 2022-06-23
Attending: ORTHOPAEDIC SURGERY
Payer: MEDICARE

## 2022-06-23 ENCOUNTER — ANESTHESIA (OUTPATIENT)
Dept: SURGERY | Facility: HOSPITAL | Age: 77
End: 2022-06-23
Payer: MEDICARE

## 2022-06-23 ENCOUNTER — HOSPITAL ENCOUNTER (OUTPATIENT)
Facility: HOSPITAL | Age: 77
Discharge: HOME | End: 2022-06-24
Attending: ORTHOPAEDIC SURGERY | Admitting: ORTHOPAEDIC SURGERY
Payer: MEDICARE

## 2022-06-23 DIAGNOSIS — Z01.818 PRE-OP TESTING: Primary | ICD-10-CM

## 2022-06-23 DIAGNOSIS — M17.12 PRIMARY OSTEOARTHRITIS OF LEFT KNEE: ICD-10-CM

## 2022-06-23 PROCEDURE — 3074F SYST BP LT 130 MM HG: CPT | Performed by: HOSPITALIST

## 2022-06-23 PROCEDURE — 3078F DIAST BP <80 MM HG: CPT | Performed by: HOSPITALIST

## 2022-06-23 PROCEDURE — 73560 X-RAY EXAM OF KNEE 1 OR 2: CPT | Performed by: ORTHOPAEDIC SURGERY

## 2022-06-23 PROCEDURE — 3008F BODY MASS INDEX DOCD: CPT | Performed by: HOSPITALIST

## 2022-06-23 PROCEDURE — 99204 OFFICE O/P NEW MOD 45 MIN: CPT | Performed by: HOSPITALIST

## 2022-06-23 PROCEDURE — 0SRD0J9 REPLACEMENT OF LEFT KNEE JOINT WITH SYNTHETIC SUBSTITUTE, CEMENTED, OPEN APPROACH: ICD-10-PCS | Performed by: ORTHOPAEDIC SURGERY

## 2022-06-23 PROCEDURE — 0SUD0JZ SUPPLEMENT LEFT KNEE JOINT WITH SYNTHETIC SUBSTITUTE, OPEN APPROACH: ICD-10-PCS | Performed by: ORTHOPAEDIC SURGERY

## 2022-06-23 DEVICE — IMPLANTABLE DEVICE
Type: IMPLANTABLE DEVICE | Site: KNEE | Status: FUNCTIONAL
Brand: PERSONA®

## 2022-06-23 DEVICE — IMPLANTABLE DEVICE
Type: IMPLANTABLE DEVICE | Site: KNEE | Status: FUNCTIONAL
Brand: PERSONA® VIVACIT-E®

## 2022-06-23 DEVICE — IMPLANTABLE DEVICE
Type: IMPLANTABLE DEVICE | Site: KNEE | Status: FUNCTIONAL
Brand: PERSONA® NATURAL TIBIA®

## 2022-06-23 DEVICE — IMPLANTABLE DEVICE
Type: IMPLANTABLE DEVICE | Site: KNEE | Status: FUNCTIONAL
Brand: BIOMET® BONE CEMENT R

## 2022-06-23 RX ORDER — MULTIPLE VITAMINS W/ MINERALS TAB 9MG-400MCG
1 TAB ORAL DAILY
Status: DISCONTINUED | OUTPATIENT
Start: 2022-06-23 | End: 2022-06-24

## 2022-06-23 RX ORDER — ASPIRIN 325 MG
325 TABLET, DELAYED RELEASE (ENTERIC COATED) ORAL 2 TIMES DAILY
Status: DISCONTINUED | OUTPATIENT
Start: 2022-06-23 | End: 2022-06-24

## 2022-06-23 RX ORDER — ERGOCALCIFEROL (VITAMIN D2) 10 MCG
400 TABLET ORAL DAILY
Status: DISCONTINUED | OUTPATIENT
Start: 2022-06-23 | End: 2022-06-24

## 2022-06-23 RX ORDER — BUPIVACAINE HYDROCHLORIDE 7.5 MG/ML
INJECTION, SOLUTION INTRASPINAL
Status: COMPLETED | OUTPATIENT
Start: 2022-06-23 | End: 2022-06-23

## 2022-06-23 RX ORDER — HYDROCODONE BITARTRATE AND ACETAMINOPHEN 7.5; 325 MG/1; MG/1
2 TABLET ORAL EVERY 6 HOURS PRN
Status: ACTIVE | OUTPATIENT
Start: 2022-06-23 | End: 2022-06-24

## 2022-06-23 RX ORDER — ACETAMINOPHEN 500 MG
1000 TABLET ORAL ONCE
Status: COMPLETED | OUTPATIENT
Start: 2022-06-23 | End: 2022-06-23

## 2022-06-23 RX ORDER — CALCIUM CARBONATE/VITAMIN D3 250-3.125
1 TABLET ORAL 2 TIMES DAILY WITH MEALS
Status: DISCONTINUED | OUTPATIENT
Start: 2022-06-23 | End: 2022-06-24

## 2022-06-23 RX ORDER — HYDROMORPHONE HYDROCHLORIDE 1 MG/ML
0.6 INJECTION, SOLUTION INTRAMUSCULAR; INTRAVENOUS; SUBCUTANEOUS
Status: ACTIVE | OUTPATIENT
Start: 2022-06-23 | End: 2022-06-24

## 2022-06-23 RX ORDER — HYDROCODONE BITARTRATE AND ACETAMINOPHEN 7.5; 325 MG/1; MG/1
1 TABLET ORAL EVERY 6 HOURS PRN
Status: ACTIVE | OUTPATIENT
Start: 2022-06-23 | End: 2022-06-24

## 2022-06-23 RX ORDER — MIDAZOLAM HYDROCHLORIDE 1 MG/ML
INJECTION INTRAMUSCULAR; INTRAVENOUS AS NEEDED
Status: DISCONTINUED | OUTPATIENT
Start: 2022-06-23 | End: 2022-06-23 | Stop reason: SURG

## 2022-06-23 RX ORDER — NALBUPHINE HCL 10 MG/ML
2.5 AMPUL (ML) INJECTION EVERY 4 HOURS PRN
Status: ACTIVE | OUTPATIENT
Start: 2022-06-23 | End: 2022-06-24

## 2022-06-23 RX ORDER — ONDANSETRON 2 MG/ML
4 INJECTION INTRAMUSCULAR; INTRAVENOUS EVERY 4 HOURS PRN
Status: DISCONTINUED | OUTPATIENT
Start: 2022-06-23 | End: 2022-06-24

## 2022-06-23 RX ORDER — HYDROMORPHONE HYDROCHLORIDE 1 MG/ML
0.4 INJECTION, SOLUTION INTRAMUSCULAR; INTRAVENOUS; SUBCUTANEOUS EVERY 5 MIN PRN
Status: DISCONTINUED | OUTPATIENT
Start: 2022-06-23 | End: 2022-06-23 | Stop reason: HOSPADM

## 2022-06-23 RX ORDER — POLYETHYLENE GLYCOL 3350 17 G/17G
17 POWDER, FOR SOLUTION ORAL DAILY PRN
Status: DISCONTINUED | OUTPATIENT
Start: 2022-06-23 | End: 2022-06-24

## 2022-06-23 RX ORDER — HYDROMORPHONE HYDROCHLORIDE 1 MG/ML
0.6 INJECTION, SOLUTION INTRAMUSCULAR; INTRAVENOUS; SUBCUTANEOUS EVERY 5 MIN PRN
Status: DISCONTINUED | OUTPATIENT
Start: 2022-06-23 | End: 2022-06-23 | Stop reason: HOSPADM

## 2022-06-23 RX ORDER — MORPHINE SULFATE 1 MG/ML
INJECTION, SOLUTION EPIDURAL; INTRATHECAL; INTRAVENOUS
Status: COMPLETED | OUTPATIENT
Start: 2022-06-23 | End: 2022-06-23

## 2022-06-23 RX ORDER — HYDROCODONE BITARTRATE AND ACETAMINOPHEN 10; 325 MG/1; MG/1
1 TABLET ORAL EVERY 4 HOURS PRN
Status: DISCONTINUED | OUTPATIENT
Start: 2022-06-23 | End: 2022-06-24

## 2022-06-23 RX ORDER — SODIUM CHLORIDE 9 MG/ML
INJECTION, SOLUTION INTRAVENOUS CONTINUOUS
Status: DISCONTINUED | OUTPATIENT
Start: 2022-06-23 | End: 2022-06-24

## 2022-06-23 RX ORDER — NALOXONE HYDROCHLORIDE 0.4 MG/ML
80 INJECTION, SOLUTION INTRAMUSCULAR; INTRAVENOUS; SUBCUTANEOUS AS NEEDED
Status: DISCONTINUED | OUTPATIENT
Start: 2022-06-23 | End: 2022-06-23 | Stop reason: HOSPADM

## 2022-06-23 RX ORDER — SENNOSIDES 8.6 MG
17.2 TABLET ORAL NIGHTLY
Status: DISCONTINUED | OUTPATIENT
Start: 2022-06-23 | End: 2022-06-24

## 2022-06-23 RX ORDER — BISACODYL 10 MG
10 SUPPOSITORY, RECTAL RECTAL
Status: DISCONTINUED | OUTPATIENT
Start: 2022-06-23 | End: 2022-06-24

## 2022-06-23 RX ORDER — MORPHINE SULFATE 10 MG/ML
6 INJECTION, SOLUTION INTRAMUSCULAR; INTRAVENOUS EVERY 10 MIN PRN
Status: DISCONTINUED | OUTPATIENT
Start: 2022-06-23 | End: 2022-06-23 | Stop reason: HOSPADM

## 2022-06-23 RX ORDER — MORPHINE SULFATE 4 MG/ML
2 INJECTION, SOLUTION INTRAMUSCULAR; INTRAVENOUS EVERY 10 MIN PRN
Status: DISCONTINUED | OUTPATIENT
Start: 2022-06-23 | End: 2022-06-23 | Stop reason: HOSPADM

## 2022-06-23 RX ORDER — DIPHENHYDRAMINE HYDROCHLORIDE 50 MG/ML
25 INJECTION INTRAMUSCULAR; INTRAVENOUS ONCE AS NEEDED
Status: ACTIVE | OUTPATIENT
Start: 2022-06-23 | End: 2022-06-23

## 2022-06-23 RX ORDER — DIPHENHYDRAMINE HCL 25 MG
25 CAPSULE ORAL EVERY 4 HOURS PRN
Status: ACTIVE | OUTPATIENT
Start: 2022-06-23 | End: 2022-06-24

## 2022-06-23 RX ORDER — PROCHLORPERAZINE EDISYLATE 5 MG/ML
5 INJECTION INTRAMUSCULAR; INTRAVENOUS ONCE AS NEEDED
Status: ACTIVE | OUTPATIENT
Start: 2022-06-23 | End: 2022-06-23

## 2022-06-23 RX ORDER — POTASSIUM CHLORIDE 750 MG/1
10 TABLET, EXTENDED RELEASE ORAL DAILY
Status: DISCONTINUED | OUTPATIENT
Start: 2022-06-23 | End: 2022-06-24

## 2022-06-23 RX ORDER — HALOPERIDOL 5 MG/ML
0.5 INJECTION INTRAMUSCULAR ONCE AS NEEDED
Status: ACTIVE | OUTPATIENT
Start: 2022-06-23 | End: 2022-06-23

## 2022-06-23 RX ORDER — LIDOCAINE HYDROCHLORIDE 10 MG/ML
INJECTION, SOLUTION INFILTRATION; PERINEURAL
Status: COMPLETED | OUTPATIENT
Start: 2022-06-23 | End: 2022-06-23

## 2022-06-23 RX ORDER — ATORVASTATIN CALCIUM 40 MG/1
40 TABLET, FILM COATED ORAL NIGHTLY
Status: DISCONTINUED | OUTPATIENT
Start: 2022-06-23 | End: 2022-06-24

## 2022-06-23 RX ORDER — HYDROCHLOROTHIAZIDE 12.5 MG/1
12.5 TABLET ORAL DAILY
Status: DISCONTINUED | OUTPATIENT
Start: 2022-06-23 | End: 2022-06-24

## 2022-06-23 RX ORDER — ONDANSETRON 2 MG/ML
4 INJECTION INTRAMUSCULAR; INTRAVENOUS ONCE AS NEEDED
Status: ACTIVE | OUTPATIENT
Start: 2022-06-23 | End: 2022-06-23

## 2022-06-23 RX ORDER — DIPHENHYDRAMINE HCL 25 MG
25 CAPSULE ORAL EVERY 4 HOURS PRN
Status: DISCONTINUED | OUTPATIENT
Start: 2022-06-23 | End: 2022-06-24

## 2022-06-23 RX ORDER — CELECOXIB 100 MG/1
100 CAPSULE ORAL 2 TIMES DAILY WITH MEALS
Status: DISCONTINUED | OUTPATIENT
Start: 2022-06-23 | End: 2022-06-24

## 2022-06-23 RX ORDER — HYDROMORPHONE HYDROCHLORIDE 1 MG/ML
0.4 INJECTION, SOLUTION INTRAMUSCULAR; INTRAVENOUS; SUBCUTANEOUS
Status: ACTIVE | OUTPATIENT
Start: 2022-06-23 | End: 2022-06-24

## 2022-06-23 RX ORDER — CEFAZOLIN SODIUM/WATER 2 G/20 ML
2 SYRINGE (ML) INTRAVENOUS ONCE
Status: DISCONTINUED | OUTPATIENT
Start: 2022-06-23 | End: 2022-06-23 | Stop reason: HOSPADM

## 2022-06-23 RX ORDER — SODIUM PHOSPHATE, DIBASIC AND SODIUM PHOSPHATE, MONOBASIC 7; 19 G/133ML; G/133ML
1 ENEMA RECTAL ONCE AS NEEDED
Status: DISCONTINUED | OUTPATIENT
Start: 2022-06-23 | End: 2022-06-24

## 2022-06-23 RX ORDER — ACETAMINOPHEN 325 MG/1
650 TABLET ORAL EVERY 8 HOURS PRN
Status: DISCONTINUED | OUTPATIENT
Start: 2022-06-23 | End: 2022-06-24

## 2022-06-23 RX ORDER — DOCUSATE SODIUM 100 MG/1
100 CAPSULE, LIQUID FILLED ORAL 2 TIMES DAILY
Status: DISCONTINUED | OUTPATIENT
Start: 2022-06-23 | End: 2022-06-24

## 2022-06-23 RX ORDER — HYDROMORPHONE HYDROCHLORIDE 1 MG/ML
0.2 INJECTION, SOLUTION INTRAMUSCULAR; INTRAVENOUS; SUBCUTANEOUS EVERY 5 MIN PRN
Status: DISCONTINUED | OUTPATIENT
Start: 2022-06-23 | End: 2022-06-23 | Stop reason: HOSPADM

## 2022-06-23 RX ORDER — DIPHENHYDRAMINE HYDROCHLORIDE 50 MG/ML
12.5 INJECTION INTRAMUSCULAR; INTRAVENOUS EVERY 4 HOURS PRN
Status: DISCONTINUED | OUTPATIENT
Start: 2022-06-23 | End: 2022-06-24

## 2022-06-23 RX ORDER — NALOXONE HYDROCHLORIDE 0.4 MG/ML
0.08 INJECTION, SOLUTION INTRAMUSCULAR; INTRAVENOUS; SUBCUTANEOUS
Status: ACTIVE | OUTPATIENT
Start: 2022-06-23 | End: 2022-06-24

## 2022-06-23 RX ORDER — TRANEXAMIC ACID 10 MG/ML
INJECTION, SOLUTION INTRAVENOUS AS NEEDED
Status: DISCONTINUED | OUTPATIENT
Start: 2022-06-23 | End: 2022-06-23 | Stop reason: SURG

## 2022-06-23 RX ORDER — EPHEDRINE SULFATE 50 MG/ML
INJECTION INTRAVENOUS AS NEEDED
Status: DISCONTINUED | OUTPATIENT
Start: 2022-06-23 | End: 2022-06-23 | Stop reason: SURG

## 2022-06-23 RX ORDER — SODIUM CHLORIDE, SODIUM LACTATE, POTASSIUM CHLORIDE, CALCIUM CHLORIDE 600; 310; 30; 20 MG/100ML; MG/100ML; MG/100ML; MG/100ML
INJECTION, SOLUTION INTRAVENOUS CONTINUOUS
Status: DISCONTINUED | OUTPATIENT
Start: 2022-06-23 | End: 2022-06-23 | Stop reason: HOSPADM

## 2022-06-23 RX ORDER — ENOXAPARIN SODIUM 100 MG/ML
40 INJECTION SUBCUTANEOUS ONCE
Status: COMPLETED | OUTPATIENT
Start: 2022-06-23 | End: 2022-06-23

## 2022-06-23 RX ORDER — MORPHINE SULFATE 4 MG/ML
4 INJECTION, SOLUTION INTRAMUSCULAR; INTRAVENOUS EVERY 10 MIN PRN
Status: DISCONTINUED | OUTPATIENT
Start: 2022-06-23 | End: 2022-06-23 | Stop reason: HOSPADM

## 2022-06-23 RX ORDER — ACETAMINOPHEN 325 MG/1
650 TABLET ORAL EVERY 6 HOURS PRN
Status: ACTIVE | OUTPATIENT
Start: 2022-06-23 | End: 2022-06-24

## 2022-06-23 RX ORDER — ONDANSETRON 2 MG/ML
4 INJECTION INTRAMUSCULAR; INTRAVENOUS ONCE
Status: COMPLETED | OUTPATIENT
Start: 2022-06-23 | End: 2022-06-23

## 2022-06-23 RX ORDER — PROCHLORPERAZINE EDISYLATE 5 MG/ML
10 INJECTION INTRAMUSCULAR; INTRAVENOUS EVERY 6 HOURS PRN
Status: DISCONTINUED | OUTPATIENT
Start: 2022-06-23 | End: 2022-06-24

## 2022-06-23 RX ORDER — ONDANSETRON 2 MG/ML
INJECTION INTRAMUSCULAR; INTRAVENOUS AS NEEDED
Status: DISCONTINUED | OUTPATIENT
Start: 2022-06-23 | End: 2022-06-23 | Stop reason: SURG

## 2022-06-23 RX ORDER — DIPHENHYDRAMINE HYDROCHLORIDE 50 MG/ML
12.5 INJECTION INTRAMUSCULAR; INTRAVENOUS EVERY 4 HOURS PRN
Status: ACTIVE | OUTPATIENT
Start: 2022-06-23 | End: 2022-06-24

## 2022-06-23 RX ORDER — CEFAZOLIN SODIUM/WATER 2 G/20 ML
2 SYRINGE (ML) INTRAVENOUS EVERY 8 HOURS
Status: COMPLETED | OUTPATIENT
Start: 2022-06-23 | End: 2022-06-24

## 2022-06-23 RX ORDER — SODIUM CHLORIDE, SODIUM LACTATE, POTASSIUM CHLORIDE, CALCIUM CHLORIDE 600; 310; 30; 20 MG/100ML; MG/100ML; MG/100ML; MG/100ML
INJECTION, SOLUTION INTRAVENOUS CONTINUOUS
Status: DISCONTINUED | OUTPATIENT
Start: 2022-06-23 | End: 2022-06-23

## 2022-06-23 RX ADMIN — MORPHINE SULFATE 0.2 MG: 1 INJECTION, SOLUTION EPIDURAL; INTRATHECAL; INTRAVENOUS at 08:09:00

## 2022-06-23 RX ADMIN — BUPIVACAINE HYDROCHLORIDE 1.4 ML: 7.5 INJECTION, SOLUTION INTRASPINAL at 08:09:00

## 2022-06-23 RX ADMIN — EPHEDRINE SULFATE 5 MG: 50 INJECTION INTRAVENOUS at 08:36:00

## 2022-06-23 RX ADMIN — EPHEDRINE SULFATE 5 MG: 50 INJECTION INTRAVENOUS at 08:11:00

## 2022-06-23 RX ADMIN — LIDOCAINE HYDROCHLORIDE 5 ML: 10 INJECTION, SOLUTION INFILTRATION; PERINEURAL at 08:09:00

## 2022-06-23 RX ADMIN — MIDAZOLAM HYDROCHLORIDE 1 MG: 1 INJECTION INTRAMUSCULAR; INTRAVENOUS at 07:55:00

## 2022-06-23 RX ADMIN — TRANEXAMIC ACID 1000 MG: 10 INJECTION, SOLUTION INTRAVENOUS at 08:12:00

## 2022-06-23 RX ADMIN — EPHEDRINE SULFATE 5 MG: 50 INJECTION INTRAVENOUS at 09:41:00

## 2022-06-23 RX ADMIN — ONDANSETRON 4 MG: 2 INJECTION INTRAMUSCULAR; INTRAVENOUS at 08:05:00

## 2022-06-23 RX ADMIN — TRANEXAMIC ACID 1000 MG: 10 INJECTION, SOLUTION INTRAVENOUS at 09:47:00

## 2022-06-23 RX ADMIN — SODIUM CHLORIDE, SODIUM LACTATE, POTASSIUM CHLORIDE, CALCIUM CHLORIDE: 600; 310; 30; 20 INJECTION, SOLUTION INTRAVENOUS at 10:18:00

## 2022-06-23 NOTE — ANESTHESIA PROCEDURE NOTES
Spinal Block  Performed by: Sneha Chowdhury CRNA  Authorized by: Sneha Chowdhury CRNA       General Information and Staff    Start Time:  6/23/2022 7:55 AM  End Time:  6/23/2022 8:00 AM  CRNA:  Sneha Chowdhury CRNA  Performed by:  CRNA  Patient Location:  OR  Site identification: surface landmarks    Preanesthetic Checklist: patient identified, IV checked, risks and benefits discussed, monitors and equipment checked, pre-op evaluation, timeout performed, anesthesia consent and sterile technique used      Procedure Details    Patient Position:  Sitting  Monitoring:  Heart rate and continuous pulse ox  Approach:  Midline  Location:  L3-4  Injection Technique:  Single-shot    Needle    Needle Type:  Pencil-tip  Needle Gauge:  25 G    Assessment    Sensory Level:  T6  Events: clear CSF, CSF aspirated, well tolerated, sensory level and blood negative      Additional Comments

## 2022-06-23 NOTE — PLAN OF CARE
Pt admitted from PACU, home alone. Get up with PT, c/o about nausea. Zofran given prn. Pt is aox4, CMS intact. Voiding check . SCD and Aspirin for DVT prophylaxis. Dressing CDI. Norco for pain prn. Denies pian. Pt plans to discharge TBD. Disease process discussed with pt. Bed in lowest position, call light and personal possessions within reach. Pt instructed to call for assistance before getting up. Problem: PAIN - ADULT  Goal: Verbalizes/displays adequate comfort level or patient's stated pain goal  Description: INTERVENTIONS:  - Encourage pt to monitor pain and request assistance  - Assess pain using appropriate pain scale  - Administer analgesics based on type and severity of pain and evaluate response  - Implement non-pharmacological measures as appropriate and evaluate response  - Consider cultural and social influences on pain and pain management  - Manage/alleviate anxiety  - Utilize distraction and/or relaxation techniques  - Monitor for opioid side effects  - Notify MD/LIP if interventions unsuccessful or patient reports new pain  - Anticipate increased pain with activity and pre-medicate as appropriate  Outcome: Progressing     Problem: SAFETY ADULT - FALL  Goal: Free from fall injury  Description: INTERVENTIONS:  - Assess pt frequently for physical needs  - Identify cognitive and physical deficits and behaviors that affect risk of falls.   - [place] fall precautions as indicated by assessment.  - Educate pt/family on patient safety including physical limitations  - Instruct pt to call for assistance with activity based on assessment  - Modify environment to reduce risk of injury  - Provide assistive devices as appropriate  - Consider OT/PT consult to assist with strengthening/mobility  - Encourage toileting schedule  Outcome: Progressing     Problem: RISK FOR INFECTION - ADULT  Goal: Absence of fever/infection during anticipated neutropenic period  Description: INTERVENTIONS  - Monitor WBC  - Administer growth factors as ordered  - Implement neutropenic guidelines  Outcome: Progressing     Problem: DISCHARGE PLANNING  Goal: Discharge to home or other facility with appropriate resources  Description: INTERVENTIONS:  - Identify barriers to discharge w/pt and caregiver  - Include patient/family/discharge partner in discharge planning  - Arrange for needed discharge resources and transportation as appropriate  - Identify discharge learning needs (meds, wound care, etc)  - Arrange for interpreters to assist at discharge as needed  - Consider post-discharge preferences of patient/family/discharge partner  - Complete POLST form as appropriate  - Assess patient's ability to be responsible for managing their own health  - Refer to Case Management Department for coordinating discharge planning if the patient needs post-hospital services based on physician/LIP order or complex needs related to functional status, cognitive ability or social support system  Outcome: Progressing

## 2022-06-23 NOTE — ANESTHESIA POSTPROCEDURE EVALUATION
P96/57atient: Estelle Roblero    Procedure Summary     Date: 06/23/22 Room / Location: [facility] MAIN OR  / [facility] MAIN OR    Anesthesia Start: 8592 Anesthesia Stop: 1890    Procedure: Left Total Knee Arthroplasty with MRI template (Left Knee) Diagnosis:       Primary osteoarthritis of left knee      (Primary osteoarthritis of left knee [M17.12])    Surgeons: Isidro Gomes MD Anesthesiologist: Gabby Hopper CRNA    Anesthesia Type: spinal ASA Status: 2          Anesthesia Type: spinal    Vitals Value Taken Time   BP 96/57 06/23/22 1019   Temp 97.3 06/23/22 1019   Pulse 84 06/23/22 1019   Resp 14 06/23/22 1019   SpO2 96 % 06/23/22 1019   Vitals shown include unvalidated device data.     [facility] Post Evaluation:   Patient Evaluated in PACU  Patient Participation: complete - patient participated  Level of Consciousness: awake  Pain Score: 0  Pain Management: adequate  Airway Patency:patent  Dental exam unchanged from preop  Yes    Cardiovascular Status: acceptable  Respiratory Status: acceptable  Postoperative Hydration acceptable      Grisel Ralph CRNA  6/23/2022 10:19 AM

## 2022-06-23 NOTE — INTERVAL H&P NOTE
Pre-op Diagnosis: Primary osteoarthritis of left knee [M17.12]    The above referenced H&P was reviewed by Charles Byrd MD on 6/23/2022, the patient was examined and no significant changes have occurred in the patient's condition since the H&P was performed. I discussed with the patient and/or legal representative the potential benefits, risks and side effects of this procedure; the likelihood of the patient achieving goals; and potential problems that might occur during recuperation. I discussed reasonable alternatives to the procedure, including risks, benefits and side effects related to the alternatives and risks related to not receiving this procedure. We will proceed with procedure as planned.

## 2022-06-23 NOTE — BRIEF OP NOTE
Pre-Operative Diagnosis: Primary osteoarthritis of left knee [M17.12]     Post-Operative Diagnosis: Primary osteoarthritis of left knee [M17.12]      Procedure Performed:   Left Total Knee Arthroplasty with MRI templated patient specific instruments    Surgeon(s) and Role: Criss Velasquez MD - Primary    Assistant(s):  PA: Kim Ferguson PA-C (first assistant); Mauro Titus CSA (second assistant)    Anesthesia: Li Mota CRNA with spinal using Duramorph/MAC     Surgical Findings: Harley persona LPS femur left 8 narrow, tibia E left with short stem extension, patella 38 x 9.5 mm, polyethylene 10mm LPS Vivacit-E. Drain: None  Specimen: Bone cuts to pathology  Complications: None  Estimated Blood Loss: 20 cc  Stable to PACU. Tranexamic acid 1 g IV second dose given in OR at end of case.     Franki Worrell MD  6/23/2022  7:58 AM

## 2022-06-24 VITALS
SYSTOLIC BLOOD PRESSURE: 100 MMHG | OXYGEN SATURATION: 94 % | HEART RATE: 88 BPM | RESPIRATION RATE: 18 BRPM | BODY MASS INDEX: 22.15 KG/M2 | WEIGHT: 125 LBS | TEMPERATURE: 99 F | HEIGHT: 63 IN | DIASTOLIC BLOOD PRESSURE: 48 MMHG

## 2022-06-24 LAB
ALBUMIN SERPL-MCNC: 3.2 G/DL (ref 3.4–5)
ANION GAP SERPL CALC-SCNC: 5 MMOL/L (ref 0–18)
BUN BLD-MCNC: 15 MG/DL (ref 7–18)
BUN/CREAT SERPL: 21.7 (ref 10–20)
CALCIUM BLD-MCNC: 8.4 MG/DL (ref 8.5–10.1)
CHLORIDE SERPL-SCNC: 106 MMOL/L (ref 98–112)
CO2 SERPL-SCNC: 29 MMOL/L (ref 21–32)
CREAT BLD-MCNC: 0.69 MG/DL
GLUCOSE BLD-MCNC: 104 MG/DL (ref 70–99)
HCT VFR BLD AUTO: 28.5 %
HGB BLD-MCNC: 8.9 G/DL
OSMOLALITY SERPL CALC.SUM OF ELEC: 291 MOSM/KG (ref 275–295)
POTASSIUM SERPL-SCNC: 3.7 MMOL/L (ref 3.5–5.1)
SODIUM SERPL-SCNC: 140 MMOL/L (ref 136–145)

## 2022-06-24 PROCEDURE — 99214 OFFICE O/P EST MOD 30 MIN: CPT | Performed by: HOSPITALIST

## 2022-06-24 RX ORDER — ACETAMINOPHEN 325 MG/1
650 TABLET ORAL EVERY 8 HOURS PRN
Qty: 40 TABLET | Refills: 0 | Status: SHIPPED | OUTPATIENT
Start: 2022-06-24

## 2022-06-24 RX ORDER — MULTIPLE VITAMINS W/ MINERALS TAB 9MG-400MCG
1 TAB ORAL DAILY
Qty: 30 TABLET | Refills: 0 | Status: SHIPPED | OUTPATIENT
Start: 2022-06-25

## 2022-06-24 RX ORDER — CELECOXIB 100 MG/1
100 CAPSULE ORAL 2 TIMES DAILY WITH MEALS
Qty: 40 CAPSULE | Refills: 0 | Status: SHIPPED | OUTPATIENT
Start: 2022-06-24

## 2022-06-24 RX ORDER — HYDROCODONE BITARTRATE AND ACETAMINOPHEN 5; 325 MG/1; MG/1
1 TABLET ORAL EVERY 8 HOURS PRN
Qty: 20 TABLET | Refills: 0 | Status: SHIPPED | OUTPATIENT
Start: 2022-06-24

## 2022-06-24 RX ORDER — PSEUDOEPHEDRINE HCL 30 MG
100 TABLET ORAL 2 TIMES DAILY
Qty: 20 CAPSULE | Refills: 0 | Status: SHIPPED | OUTPATIENT
Start: 2022-06-24

## 2022-06-24 NOTE — CM/SW NOTE
06/24/22 1200   CM/SW Referral Data   Referral Source Physician   Reason for Referral Discharge planning   Informant Patient   Patient Info   Patient's Current Mental Status at Time of Assessment Alert;Oriented   Patient's [address]   Number of Levels in Home 1   Number of Stair in Home 5 steps   Patient lives with Alone  (sister will be assisting post discharge)   Patient Status Prior to Admission   Independent with ADLs and Mobility Yes   Discharge Needs   Anticipated D/C needs Home health care   Choice of Post-Acute Provider   Informed patient of right to choose their preferred provider Yes   List of appropriate post-acute services provided to patient/family with quality data Yes  (will need to provide choice)     Pt agreeable to [facility]. Referrals sent. Orders/F2F entered. Will need to provide choice for pt once list is avail. [id number]  [facility] accepted - reserved in aidin    PLAN: Home with [address], LSW, MSW ext.  [phone]

## 2022-06-24 NOTE — OPERATIVE REPORT
[facility]    PATIENT'S NAME: Sourav Lai   ATTENDING PHYSICIAN: [address] Rachel Samaniego MD   OPERATING PHYSICIAN: Shalini Samaniego MD   PATIENT ACCOUNT#:   [id number]    [facility] [place] [address] RECORD #:   [id number]       YOB: 1945  ADMISSION DATE:       06/23/2022      OPERATION DATE:  06/23/2022    OPERATIVE REPORT      PREOPERATIVE DIAGNOSIS:  Primary osteoarthritis, left knee. POSTOPERATIVE DIAGNOSIS:  Primary osteoarthritis, left knee. PROCEDURE:  Left total knee arthroplasty with MRI templated patient-specific instruments. FIRST ASSISTANT:  Allyson Hu PA-C.      SECOND ASSISTANT:  DAVID Woodson. ANESTHESIA:  Flip Underwood CRNA, with spinal using Duramorph/MAC. INDICATIONS:  Patient is a 48-year-old independent woman with the above diagnosis documented by physical exam, x-ray, and MRI. She has failed nonoperative treatment. The risks and complications of surgery were discussed and no guarantees were given. Consent was signed. Due to her osteoporosis and the need for careful retraction to allow safe and proper implantation of the components, 2 surgical assistants were medically necessary. They not only provided the safe retraction necessary without damaging her bone, but also provided placing the patient on the operative table safely and preparing the tourniquet. They provided superficial closure, dressings, and safe transfer to the recovery room. In this particular case, without 2 surgical assistants, the surgery would have been far more difficult and less safe and, therefore, they were medically necessary. OPERATIVE TECHNIQUE:  Patient was brought into the operating room and placed in supine position. Appropriate IV access and monitors were placed. Ancef 2 g IV was given as prophylaxis. Tranexamic acid 1 g IV was also given as prophylaxis at the appropriate interval.  Spinal Duramorph was done by Ely Magaña CRNA.   Patient was placed under deeper monitored sedation. SCD boot was on the right leg. Tourniquet had been placed on the upper left thigh. The left lower extremity was then prepped in sterile fashion with Betadine paint followed by ChloraPrep. The leg was exsanguinated, the tourniquet was inflated to 300 mmHg. Tourniquet time for the case was 76 minutes. Incision was made and a midvastus snip was used to gain access to the knee. The patient had a mild element of pigmented villonodular synovitis but nothing florid. The synovium was brown discoloration, but there was no large scarring or villi sticking out. A mild synovectomy was done. The fat pad was excised. Patella was resected from 22 mm to 14 mm. A 38 mm patella fit optimally and the 3 drill holes placed. A protector was placed and the patella tucked in the lateral gutter. The knee was then flexed and the anterior menisci and cruciate ligaments were excised. The patient-specific guide fit very well in the distal femur and the distal femoral cuts were made as templated. Osteoporosis was confirmed. Knee was flexed further and the size 8 cutting block was applied and seen to be in proper external rotation. The anterior cut was made without notch. The posterior cuts were made as templated. The chamfer cuts were then finished. Some medial and lateral osteophytes were removed. The rest of the menisci were excised, and the patient-specific guide was then applied to the proximal tibia. The drop mariaa was in line with the anterior tibial spine with proper posterior slope. The cuts were made as templated. Medial osteophyte was removed. Size E tibia fit as templated. Spacer blocks confirmed good balance in flexion and extension and a notch was cut for the femur in the LPS position. Trial reduction was then performed. With a 10 mm spacer, the knee now came into full extension and full deep flexion without liftoff.   Proper rollback was noted and the patella was tracking well. There was excellent balance at 0 degrees, 40 degrees, and 100 degrees. The pegs were drilled for the femur and the stem prepared for the tibia. Two batches of Biomet methylmethacrylate cement were mixed in a vacuum container. The bone surfaces were cleansed thoroughly with saline using Pulsavac lavage, then dried thoroughly. The cement was pressurized into the cancellous bone on all surfaces and the tibia followed by femur followed by patella were cemented in standard fashion. A spacer was placed. An assistant held axial pressure while the cement hardened and excess cement was removed. During the last 3 minutes of cement drying, the knee had been filled with a dilute Betadine solution. This was removed and irrigated with saline. Any excess cement was removed posteriorly and a trial reduction confirmed the 10 mm spacer fit optimally. Again excellent motion, balance, tracking, and rollback were noted. The size 10 mm LPS Vivacity polyethylene was snapped into position and no soft tissue was interposed. Again, all the parameters showed excellent motion and balance. The tourniquet was let down and the knee closed in flexion in layers. No significant bleeding was noted. Tranexamic acid 1 g IV second dose was given in the OR about 10 minutes after the tourniquet was let down. The knee was closed in flexion in layers with Monocryl for the skin and Exofin glue. Once the glue dried, silver Mepilex dressing was applied and cotton followed by ice packs. Patient was taken out of sedation and brought to recovery room in stable condition. Blood loss was 20 mL. The specimens were bone cuts to Pathology. There were no drains and no complications, and patient tolerated procedure well. Dictated By Marialuisa Brasher.  Kenny Huber MD  d: 06/23/2022 10:09:33  t: 06/23/2022 18:51:10  [facility] [id number]  [facility]/    cc: Leelee Nunez MD

## 2022-06-24 NOTE — ANESTHESIA POST-OP FOLLOW-UP NOTE
Chuy Villa is POD#1 after   Surgical Procedures     Case IDs Date Procedure Surgeon Location Status    [id number] 6/23/22 Left Total Knee Arthroplasty with MRI templated patient specific instruments Elfego Bustamante MD [address] [place] [address] OR [facility]      Rachel Roblero underwent neuraxial anesthesia with intrathecal duramorph for analgesia. Tolerated the procedure well. Today, denies headache, back pain, or residual LE weakness/numbness. Injection site examined, no erythema, hematoma, or tenderness to palpation. Pain score is 2/10. No further anesthesia management needed at this time. Doing well on oral pain meds. All anesthetic questions answered.      Maura Bob MD

## 2022-06-24 NOTE — CM/SW NOTE
Department  notified of request for [facility], aidin referrals started. Assigned CM/SW to follow up with pt/family on further discharge planning.      Brenda Garcia   June 24, 2022   09:40

## 2022-07-06 ENCOUNTER — TELEPHONE (OUTPATIENT)
Dept: ORTHOPEDICS CLINIC | Facility: CLINIC | Age: 77
End: 2022-07-06

## 2022-07-06 DIAGNOSIS — Z47.89 ORTHOPEDIC AFTERCARE: Primary | ICD-10-CM

## 2022-07-06 DIAGNOSIS — Z96.652 S/P TKR (TOTAL KNEE REPLACEMENT), LEFT: ICD-10-CM

## 2022-07-06 NOTE — TELEPHONE ENCOUNTER
Per pt she is needing order for PT faxed to [facility] fax# [phone].  Please advise

## 2022-07-08 ENCOUNTER — TELEPHONE (OUTPATIENT)
Dept: INTERNAL MEDICINE CLINIC | Facility: CLINIC | Age: 77
End: 2022-07-08

## 2022-07-08 NOTE — TELEPHONE ENCOUNTER
Current Outpatient Medications   Medication Sig Dispense Refill   LIDOCAINE PATCH    Patient is requesting preferred alternative medications: GABAPENTIN CAPSULE,   PREGABALIN CAPSULE, OR GABAPENTIN TABLET

## 2022-07-08 NOTE — TELEPHONE ENCOUNTER
Spoke with pt,  verified  She stated [facility] called pt and they gave rx for lidocaine, it was rx'd by ER MD.   She was told med will cost her $81 but they have alternative med for a lower cost.  Pt stated she doesn't need them right now. Pt has ov with Dr Jagdish Machado (ortho) ov 22 and she will discuss other option with MD.  No further action needed at this time.        FYI      Future Appointments   Date Time Provider Carmen Adela   2022  3:50 PM Fortino Kolb MD THE [facility] [address]   2022  6:30 PM Win Miller, PT [address] PT EM [address]   2022  9:15 AM Susan Chanelle, PTA [address] PT EM [address]   2022  3:30 PM Susan Chanelle, PTA [address] PT EM [address]   2022 10:45 AM Susan Chanelle, PTA [facility] PT EM [address]   8/3/2022 10:00 AM Susan Chanelle, PTA [address] PT EM [address]   2022 10:45 AM Susan Chanelle, PTA [facility] PT EM [address]   8/10/2022 10:00 AM Susan Chanelle, PTA [facility] PT EM [address]   2022 10:00 AM Susan Chanelle, PTA [address] PT EM [address]   2022 10:00 AM Susan Chanelle, PTA [address] PT EM [address]   2022 10:00 AM Susan Chanelle, PTA [address] PT EM [address]   2022  1:30 PM Susan Chanelle, PTA [address] PT EM [address]   2022  1:30 PM Win Miller, PT [address] PT EM [address]   2022  8:30 AM Melinda Quinteros MD [place] [id number]

## 2022-07-12 ENCOUNTER — OFFICE VISIT (OUTPATIENT)
Dept: ORTHOPEDICS CLINIC | Facility: CLINIC | Age: 77
End: 2022-07-12
Payer: MEDICARE

## 2022-07-12 ENCOUNTER — HOSPITAL ENCOUNTER (OUTPATIENT)
Dept: GENERAL RADIOLOGY | Facility: HOSPITAL | Age: 77
Discharge: HOME | End: 2022-07-12
Attending: ORTHOPAEDIC SURGERY
Payer: MEDICARE

## 2022-07-12 ENCOUNTER — TELEPHONE (OUTPATIENT)
Dept: PHYSICAL THERAPY | Facility: HOSPITAL | Age: 77
End: 2022-07-12

## 2022-07-12 DIAGNOSIS — Z47.89 ORTHOPEDIC AFTERCARE: ICD-10-CM

## 2022-07-12 DIAGNOSIS — Z96.652 S/P TKR (TOTAL KNEE REPLACEMENT), LEFT: ICD-10-CM

## 2022-07-12 DIAGNOSIS — M25.562 CHRONIC PAIN OF LEFT KNEE: ICD-10-CM

## 2022-07-12 DIAGNOSIS — G89.29 CHRONIC PAIN OF LEFT KNEE: ICD-10-CM

## 2022-07-12 DIAGNOSIS — M17.12 PRIMARY OSTEOARTHRITIS OF LEFT KNEE: Primary | ICD-10-CM

## 2022-07-12 PROCEDURE — 73564 X-RAY EXAM KNEE 4 OR MORE: CPT | Performed by: ORTHOPAEDIC SURGERY

## 2022-07-12 PROCEDURE — 99024 POSTOP FOLLOW-UP VISIT: CPT | Performed by: ORTHOPAEDIC SURGERY

## 2022-07-12 PROCEDURE — 1111F DSCHRG MED/CURRENT MED MERGE: CPT | Performed by: ORTHOPAEDIC SURGERY

## 2022-07-12 RX ORDER — HYDROCODONE BITARTRATE AND ACETAMINOPHEN 5; 325 MG/1; MG/1
1 TABLET ORAL EVERY 8 HOURS PRN
Qty: 20 TABLET | Refills: 0 | Status: SHIPPED | OUTPATIENT
Start: 2022-07-12

## 2022-07-14 ENCOUNTER — OFFICE VISIT (OUTPATIENT)
Dept: PHYSICAL THERAPY | Facility: HOSPITAL | Age: 77
End: 2022-07-14
Attending: ORTHOPAEDIC SURGERY
Payer: MEDICARE

## 2022-07-14 DIAGNOSIS — Z96.652 S/P TKR (TOTAL KNEE REPLACEMENT), LEFT: ICD-10-CM

## 2022-07-14 DIAGNOSIS — Z47.89 ORTHOPEDIC AFTERCARE: ICD-10-CM

## 2022-07-14 PROCEDURE — 97110 THERAPEUTIC EXERCISES: CPT

## 2022-07-14 PROCEDURE — 97161 PT EVAL LOW COMPLEX 20 MIN: CPT

## 2022-07-19 ENCOUNTER — TELEPHONE (OUTPATIENT)
Dept: ORTHOPEDICS CLINIC | Facility: CLINIC | Age: 77
End: 2022-07-19

## 2022-07-19 NOTE — TELEPHONE ENCOUNTER
I contacted the reps for [employer]. She has a Harley Persona knee replacement. They are not aware of any recalls.   This is probably spam.  They said they will call the company but at this point it looks like spam.

## 2022-07-19 NOTE — TELEPHONE ENCOUNTER
Patient had Mag Bianka on 6/24/22. States she received a call that there is a recall.  Was wondering if this is spam? Please advise

## 2022-07-19 NOTE — TELEPHONE ENCOUNTER
Pt called stating pt had knee replacement surgery on 6-23-22. Received a message that there is a recall. Pt does not recognize the phone number. Is there a recall.   Please call pt

## 2022-07-20 ENCOUNTER — OFFICE VISIT (OUTPATIENT)
Dept: PHYSICAL THERAPY | Facility: HOSPITAL | Age: 77
End: 2022-07-20
Attending: ORTHOPAEDIC SURGERY
Payer: MEDICARE

## 2022-07-20 DIAGNOSIS — Z47.89 ORTHOPEDIC AFTERCARE: ICD-10-CM

## 2022-07-20 DIAGNOSIS — Z96.652 S/P TKR (TOTAL KNEE REPLACEMENT), LEFT: ICD-10-CM

## 2022-07-20 PROCEDURE — 97140 MANUAL THERAPY 1/> REGIONS: CPT

## 2022-07-20 PROCEDURE — 97110 THERAPEUTIC EXERCISES: CPT

## 2022-07-22 ENCOUNTER — OFFICE VISIT (OUTPATIENT)
Dept: PHYSICAL THERAPY | Facility: HOSPITAL | Age: 77
End: 2022-07-22
Attending: ORTHOPAEDIC SURGERY
Payer: MEDICARE

## 2022-07-22 PROCEDURE — 97140 MANUAL THERAPY 1/> REGIONS: CPT

## 2022-07-22 PROCEDURE — 97110 THERAPEUTIC EXERCISES: CPT

## 2022-07-27 ENCOUNTER — OFFICE VISIT (OUTPATIENT)
Dept: PHYSICAL THERAPY | Facility: HOSPITAL | Age: 77
End: 2022-07-27
Attending: ORTHOPAEDIC SURGERY
Payer: MEDICARE

## 2022-07-27 PROCEDURE — 97110 THERAPEUTIC EXERCISES: CPT

## 2022-07-27 PROCEDURE — 97140 MANUAL THERAPY 1/> REGIONS: CPT

## 2022-08-03 ENCOUNTER — OFFICE VISIT (OUTPATIENT)
Dept: PHYSICAL THERAPY | Facility: HOSPITAL | Age: 77
End: 2022-08-03
Attending: ORTHOPAEDIC SURGERY
Payer: MEDICARE

## 2022-08-03 PROCEDURE — 97140 MANUAL THERAPY 1/> REGIONS: CPT

## 2022-08-03 PROCEDURE — 97110 THERAPEUTIC EXERCISES: CPT

## 2022-08-03 PROCEDURE — 97112 NEUROMUSCULAR REEDUCATION: CPT

## 2022-08-05 ENCOUNTER — TELEPHONE (OUTPATIENT)
Dept: ENDOCRINOLOGY CLINIC | Facility: CLINIC | Age: 77
End: 2022-08-05

## 2022-08-05 ENCOUNTER — OFFICE VISIT (OUTPATIENT)
Dept: PHYSICAL THERAPY | Facility: HOSPITAL | Age: 77
End: 2022-08-05
Attending: ORTHOPAEDIC SURGERY
Payer: MEDICARE

## 2022-08-05 PROCEDURE — 97112 NEUROMUSCULAR REEDUCATION: CPT

## 2022-08-05 PROCEDURE — 97140 MANUAL THERAPY 1/> REGIONS: CPT

## 2022-08-05 PROCEDURE — 97110 THERAPEUTIC EXERCISES: CPT

## 2022-08-05 NOTE — TELEPHONE ENCOUNTER
Patient calling regarding scheduling Reclast infusion - order faxed to [facility] in May but patient deferred until now d/t knee surgery  Spoke to [address] [place] [address] stated paperwork looks good and they will contact patient to schedule reclast - patient notified that [facility] will call her to schedule

## 2022-08-10 ENCOUNTER — OFFICE VISIT (OUTPATIENT)
Dept: PHYSICAL THERAPY | Facility: HOSPITAL | Age: 77
End: 2022-08-10
Attending: ORTHOPAEDIC SURGERY
Payer: MEDICARE

## 2022-08-10 PROCEDURE — 97110 THERAPEUTIC EXERCISES: CPT

## 2022-08-12 ENCOUNTER — OFFICE VISIT (OUTPATIENT)
Dept: PHYSICAL THERAPY | Facility: HOSPITAL | Age: 77
End: 2022-08-12
Attending: ORTHOPAEDIC SURGERY
Payer: MEDICARE

## 2022-08-12 PROCEDURE — 97112 NEUROMUSCULAR REEDUCATION: CPT

## 2022-08-12 PROCEDURE — 97110 THERAPEUTIC EXERCISES: CPT

## 2022-08-16 ENCOUNTER — HOSPITAL ENCOUNTER (OUTPATIENT)
Dept: GENERAL RADIOLOGY | Facility: HOSPITAL | Age: 77
Discharge: HOME | End: 2022-08-16
Attending: ORTHOPAEDIC SURGERY
Payer: MEDICARE

## 2022-08-16 ENCOUNTER — OFFICE VISIT (OUTPATIENT)
Dept: ORTHOPEDICS CLINIC | Facility: CLINIC | Age: 77
End: 2022-08-16
Payer: MEDICARE

## 2022-08-16 DIAGNOSIS — Z47.89 ORTHOPEDIC AFTERCARE: ICD-10-CM

## 2022-08-16 DIAGNOSIS — Z96.652 S/P TKR (TOTAL KNEE REPLACEMENT), LEFT: ICD-10-CM

## 2022-08-16 DIAGNOSIS — M17.12 PRIMARY OSTEOARTHRITIS OF LEFT KNEE: Primary | ICD-10-CM

## 2022-08-16 DIAGNOSIS — M17.11 PRIMARY OSTEOARTHRITIS OF RIGHT KNEE: ICD-10-CM

## 2022-08-16 PROCEDURE — 1126F AMNT PAIN NOTED NONE PRSNT: CPT | Performed by: ORTHOPAEDIC SURGERY

## 2022-08-16 PROCEDURE — 99024 POSTOP FOLLOW-UP VISIT: CPT | Performed by: ORTHOPAEDIC SURGERY

## 2022-08-16 PROCEDURE — 73562 X-RAY EXAM OF KNEE 3: CPT | Performed by: ORTHOPAEDIC SURGERY

## 2022-08-17 ENCOUNTER — OFFICE VISIT (OUTPATIENT)
Dept: PHYSICAL THERAPY | Facility: HOSPITAL | Age: 77
End: 2022-08-17
Attending: ORTHOPAEDIC SURGERY
Payer: MEDICARE

## 2022-08-17 PROCEDURE — 97110 THERAPEUTIC EXERCISES: CPT

## 2022-08-17 PROCEDURE — 97112 NEUROMUSCULAR REEDUCATION: CPT

## 2022-08-19 ENCOUNTER — OFFICE VISIT (OUTPATIENT)
Dept: PHYSICAL THERAPY | Facility: HOSPITAL | Age: 77
End: 2022-08-19
Attending: ORTHOPAEDIC SURGERY
Payer: MEDICARE

## 2022-08-19 PROCEDURE — 97110 THERAPEUTIC EXERCISES: CPT

## 2022-08-19 PROCEDURE — 97112 NEUROMUSCULAR REEDUCATION: CPT

## 2022-08-23 ENCOUNTER — OFFICE VISIT (OUTPATIENT)
Dept: PHYSICAL THERAPY | Facility: HOSPITAL | Age: 77
End: 2022-08-23
Attending: ORTHOPAEDIC SURGERY
Payer: MEDICARE

## 2022-08-23 PROCEDURE — 97110 THERAPEUTIC EXERCISES: CPT

## 2022-08-23 PROCEDURE — 97112 NEUROMUSCULAR REEDUCATION: CPT

## 2022-08-25 ENCOUNTER — OFFICE VISIT (OUTPATIENT)
Dept: PHYSICAL THERAPY | Facility: HOSPITAL | Age: 77
End: 2022-08-25
Attending: ORTHOPAEDIC SURGERY
Payer: MEDICARE

## 2022-08-25 PROCEDURE — 97110 THERAPEUTIC EXERCISES: CPT

## 2022-09-15 ENCOUNTER — HOSPITAL ENCOUNTER (OUTPATIENT)
Dept: GENERAL RADIOLOGY | Age: 77
Discharge: HOME | End: 2022-09-15
Attending: NURSE PRACTITIONER
Payer: MEDICARE

## 2022-09-15 ENCOUNTER — OFFICE VISIT (OUTPATIENT)
Dept: INTERNAL MEDICINE CLINIC | Facility: CLINIC | Age: 77
End: 2022-09-15
Payer: MEDICARE

## 2022-09-15 ENCOUNTER — LAB ENCOUNTER (OUTPATIENT)
Dept: LAB | Age: 77
End: 2022-09-15
Attending: NURSE PRACTITIONER
Payer: MEDICARE

## 2022-09-15 VITALS
WEIGHT: 122 LBS | SYSTOLIC BLOOD PRESSURE: 126 MMHG | RESPIRATION RATE: 16 BRPM | DIASTOLIC BLOOD PRESSURE: 73 MMHG | BODY MASS INDEX: 21.62 KG/M2 | HEIGHT: 63 IN | HEART RATE: 68 BPM

## 2022-09-15 DIAGNOSIS — R32 FEMALE INCONTINENCE: ICD-10-CM

## 2022-09-15 DIAGNOSIS — R10.13 EPIGASTRIC PAIN: ICD-10-CM

## 2022-09-15 DIAGNOSIS — R63.4 WEIGHT LOSS, UNINTENTIONAL: ICD-10-CM

## 2022-09-15 DIAGNOSIS — R00.2 PALPITATIONS: Primary | ICD-10-CM

## 2022-09-15 DIAGNOSIS — R00.2 PALPITATIONS: ICD-10-CM

## 2022-09-15 LAB
ALBUMIN SERPL-MCNC: 4 G/DL (ref 3.4–5)
ALBUMIN/GLOB SERPL: 1 {RATIO} (ref 1–2)
ALP LIVER SERPL-CCNC: 59 U/L
ALT SERPL-CCNC: 15 U/L
ANION GAP SERPL CALC-SCNC: 4 MMOL/L (ref 0–18)
AST SERPL-CCNC: 13 U/L (ref 15–37)
BASOPHILS # BLD AUTO: 0.03 X10(3) UL (ref 0–0.2)
BASOPHILS NFR BLD AUTO: 0.6 %
BILIRUB SERPL-MCNC: 1.1 MG/DL (ref 0.1–2)
BILIRUB UR QL: NEGATIVE
BUN BLD-MCNC: 12 MG/DL (ref 7–18)
BUN/CREAT SERPL: 15 (ref 10–20)
CALCIUM BLD-MCNC: 9.7 MG/DL (ref 8.5–10.1)
CHLORIDE SERPL-SCNC: 101 MMOL/L (ref 98–112)
CLARITY UR: CLEAR
CO2 SERPL-SCNC: 32 MMOL/L (ref 21–32)
COLOR UR: YELLOW
CREAT BLD-MCNC: 0.8 MG/DL
CRP SERPL-MCNC: <0.29 MG/DL (ref ?–0.3)
DEPRECATED RDW RBC AUTO: 48.5 FL (ref 35.1–46.3)
EOSINOPHIL # BLD AUTO: 0.04 X10(3) UL (ref 0–0.7)
EOSINOPHIL NFR BLD AUTO: 0.8 %
ERYTHROCYTE [DISTWIDTH] IN BLOOD BY AUTOMATED COUNT: 13.3 % (ref 11–15)
ERYTHROCYTE [SEDIMENTATION RATE] IN BLOOD: 46 MM/HR
FASTING STATUS PATIENT QL REPORTED: YES
GFR SERPLBLD BASED ON 1.73 SQ M-ARVRAT: 76 ML/MIN/1.73M2 (ref 60–?)
GLOBULIN PLAS-MCNC: 4.2 G/DL (ref 2.8–4.4)
GLUCOSE BLD-MCNC: 93 MG/DL (ref 70–99)
GLUCOSE UR-MCNC: NEGATIVE MG/DL
HCT VFR BLD AUTO: 37.5 %
HCV AB SERPL QL IA: NONREACTIVE
HGB BLD-MCNC: 11.7 G/DL
IMM GRANULOCYTES # BLD AUTO: 0.01 X10(3) UL (ref 0–1)
IMM GRANULOCYTES NFR BLD: 0.2 %
KETONES UR-MCNC: NEGATIVE MG/DL
LYMPHOCYTES # BLD AUTO: 1.69 X10(3) UL (ref 1–4)
LYMPHOCYTES NFR BLD AUTO: 35.4 %
MCH RBC QN AUTO: 30.9 PG (ref 26–34)
MCHC RBC AUTO-ENTMCNC: 31.2 G/DL (ref 31–37)
MCV RBC AUTO: 98.9 FL
MONOCYTES # BLD AUTO: 0.36 X10(3) UL (ref 0.1–1)
MONOCYTES NFR BLD AUTO: 7.5 %
NEUTROPHILS # BLD AUTO: 2.64 X10 (3) UL (ref 1.5–7.7)
NEUTROPHILS # BLD AUTO: 2.64 X10(3) UL (ref 1.5–7.7)
NEUTROPHILS NFR BLD AUTO: 55.5 %
NITRITE UR QL STRIP.AUTO: NEGATIVE
OSMOLALITY SERPL CALC.SUM OF ELEC: 283 MOSM/KG (ref 275–295)
PH UR: 7 [PH] (ref 5–8)
PLATELET # BLD AUTO: 316 10(3)UL (ref 150–450)
POTASSIUM SERPL-SCNC: 3.9 MMOL/L (ref 3.5–5.1)
PROT SERPL-MCNC: 8.2 G/DL (ref 6.4–8.2)
PROT UR-MCNC: NEGATIVE MG/DL
RBC # BLD AUTO: 3.79 X10(6)UL
SODIUM SERPL-SCNC: 137 MMOL/L (ref 136–145)
SP GR UR STRIP: 1.02 (ref 1–1.03)
TSI SER-ACNC: 0.91 MIU/ML (ref 0.36–3.74)
UROBILINOGEN UR STRIP-ACNC: 0.2
WBC # BLD AUTO: 4.8 X10(3) UL (ref 4–11)

## 2022-09-15 PROCEDURE — 84443 ASSAY THYROID STIM HORMONE: CPT

## 2022-09-15 PROCEDURE — 87086 URINE CULTURE/COLONY COUNT: CPT

## 2022-09-15 PROCEDURE — 85025 COMPLETE CBC W/AUTO DIFF WBC: CPT

## 2022-09-15 PROCEDURE — 71046 X-RAY EXAM CHEST 2 VIEWS: CPT | Performed by: NURSE PRACTITIONER

## 2022-09-15 PROCEDURE — 81015 MICROSCOPIC EXAM OF URINE: CPT

## 2022-09-15 PROCEDURE — 86140 C-REACTIVE PROTEIN: CPT

## 2022-09-15 PROCEDURE — 3008F BODY MASS INDEX DOCD: CPT | Performed by: NURSE PRACTITIONER

## 2022-09-15 PROCEDURE — 3074F SYST BP LT 130 MM HG: CPT | Performed by: NURSE PRACTITIONER

## 2022-09-15 PROCEDURE — 3078F DIAST BP <80 MM HG: CPT | Performed by: NURSE PRACTITIONER

## 2022-09-15 PROCEDURE — 87389 HIV-1 AG W/HIV-1&-2 AB AG IA: CPT

## 2022-09-15 PROCEDURE — 81001 URINALYSIS AUTO W/SCOPE: CPT

## 2022-09-15 PROCEDURE — 1126F AMNT PAIN NOTED NONE PRSNT: CPT | Performed by: NURSE PRACTITIONER

## 2022-09-15 PROCEDURE — 82274 ASSAY TEST FOR BLOOD FECAL: CPT

## 2022-09-15 PROCEDURE — 87338 HPYLORI STOOL AG IA: CPT | Performed by: NURSE PRACTITIONER

## 2022-09-15 PROCEDURE — 86803 HEPATITIS C AB TEST: CPT

## 2022-09-15 PROCEDURE — 36415 COLL VENOUS BLD VENIPUNCTURE: CPT

## 2022-09-15 PROCEDURE — 85652 RBC SED RATE AUTOMATED: CPT

## 2022-09-15 PROCEDURE — 80053 COMPREHEN METABOLIC PANEL: CPT

## 2022-09-15 PROCEDURE — 99214 OFFICE O/P EST MOD 30 MIN: CPT | Performed by: NURSE PRACTITIONER

## 2022-09-15 RX ORDER — OMEPRAZOLE 20 MG/1
20 CAPSULE, DELAYED RELEASE ORAL
Qty: 30 CAPSULE | Refills: 3 | Status: SHIPPED | OUTPATIENT
Start: 2022-09-15

## 2022-09-19 ENCOUNTER — HOSPITAL ENCOUNTER (OUTPATIENT)
Dept: CV DIAGNOSTICS | Facility: HOSPITAL | Age: 77
Discharge: HOME | End: 2022-09-19
Attending: NURSE PRACTITIONER

## 2022-09-19 DIAGNOSIS — R00.2 PALPITATIONS: ICD-10-CM

## 2022-09-19 PROCEDURE — 93242 EXT ECG>48HR<7D RECORDING: CPT | Performed by: NURSE PRACTITIONER

## 2022-09-19 PROCEDURE — 93243 EXT ECG>48HR<7D SCAN A/R: CPT | Performed by: NURSE PRACTITIONER

## 2022-09-21 ENCOUNTER — APPOINTMENT (OUTPATIENT)
Dept: LAB | Facility: HOSPITAL | Age: 77
End: 2022-09-21
Attending: NURSE PRACTITIONER

## 2022-09-21 PROCEDURE — 82274 ASSAY TEST FOR BLOOD FECAL: CPT

## 2022-09-22 ENCOUNTER — OFFICE VISIT (OUTPATIENT)
Dept: INTERNAL MEDICINE CLINIC | Facility: CLINIC | Age: 77
End: 2022-09-22

## 2022-09-22 ENCOUNTER — LAB ENCOUNTER (OUTPATIENT)
Dept: LAB | Facility: HOSPITAL | Age: 77
End: 2022-09-22
Attending: NURSE PRACTITIONER

## 2022-09-22 VITALS
HEART RATE: 66 BPM | WEIGHT: 123 LBS | DIASTOLIC BLOOD PRESSURE: 59 MMHG | HEIGHT: 63 IN | RESPIRATION RATE: 18 BRPM | BODY MASS INDEX: 21.79 KG/M2 | SYSTOLIC BLOOD PRESSURE: 121 MMHG | OXYGEN SATURATION: 96 %

## 2022-09-22 DIAGNOSIS — I10 ESSENTIAL HYPERTENSION: ICD-10-CM

## 2022-09-22 DIAGNOSIS — N39.490 OVERFLOW INCONTINENCE OF URINE: Primary | ICD-10-CM

## 2022-09-22 DIAGNOSIS — R63.4 WEIGHT LOSS, UNINTENTIONAL: ICD-10-CM

## 2022-09-22 DIAGNOSIS — R10.13 EPIGASTRIC PAIN: ICD-10-CM

## 2022-09-22 DIAGNOSIS — R00.2 PALPITATIONS: ICD-10-CM

## 2022-09-22 LAB — HEMOCCULT STL QL: POSITIVE

## 2022-09-22 PROCEDURE — 3008F BODY MASS INDEX DOCD: CPT | Performed by: INTERNAL MEDICINE

## 2022-09-22 PROCEDURE — G0008 ADMIN INFLUENZA VIRUS VAC: HCPCS | Performed by: INTERNAL MEDICINE

## 2022-09-22 PROCEDURE — 99214 OFFICE O/P EST MOD 30 MIN: CPT | Performed by: INTERNAL MEDICINE

## 2022-09-22 PROCEDURE — 3074F SYST BP LT 130 MM HG: CPT | Performed by: INTERNAL MEDICINE

## 2022-09-22 PROCEDURE — 1125F AMNT PAIN NOTED PAIN PRSNT: CPT | Performed by: INTERNAL MEDICINE

## 2022-09-22 PROCEDURE — 3078F DIAST BP <80 MM HG: CPT | Performed by: INTERNAL MEDICINE

## 2022-09-22 PROCEDURE — 90662 IIV NO PRSV INCREASED AG IM: CPT | Performed by: INTERNAL MEDICINE

## 2022-09-22 RX ORDER — ASPIRIN 81 MG/1
81 TABLET ORAL DAILY
COMMUNITY

## 2022-09-22 RX ORDER — METOPROLOL SUCCINATE 25 MG/1
25 TABLET, EXTENDED RELEASE ORAL DAILY
Qty: 90 TABLET | Refills: 1 | Status: SHIPPED | OUTPATIENT
Start: 2022-09-22

## 2022-09-22 NOTE — ASSESSMENT & PLAN NOTE
I reviewed her recent urinalysis and culture. Culture was negative. Patient would like to stop the hydrochlorothiazide. We will do that, however I advised her that this might not be enough to control her symptoms. If not, I recommend that she go for physical therapy.

## 2022-09-22 NOTE — ASSESSMENT & PLAN NOTE
Advised patient to start taking omeprazole for her abdominal pain. Follow-up in 2 to 3 weeks. pt comes to ED for fussiness and crying at home all night. mother states changed child formula in the last few days, stools have been more solid thinks the baby is constipated. child is sleeping on arrival. up to date on vaccinations. auscultated hr correlates with v/s machine

## 2022-09-25 DIAGNOSIS — E03.9 ACQUIRED HYPOTHYROIDISM: ICD-10-CM

## 2022-09-26 RX ORDER — LEVOTHYROXINE SODIUM 0.12 MG/1
125 TABLET ORAL
Qty: 90 TABLET | Refills: 1 | Status: SHIPPED | OUTPATIENT
Start: 2022-09-26 | End: 2023-01-17

## 2022-09-26 NOTE — TELEPHONE ENCOUNTER
[facility]. Requested Prescriptions   Pending Prescriptions Disp Refills    LEVOTHYROXINE 125 MCG Oral Tab [Pharmacy Med Name: LEVOTHYROXINE SODIUM 125 MCG Tablet] 90 tablet 1     Sig: TAKE 1 TABLET BEFORE BREAKFAST.         Thyroid Medication Protocol Passed - 9/25/2022  3:22 AM        Passed - TSH in past 12 months        Passed - Last TSH value is normal     Lab Results   Component Value Date    TSH 0.913 09/15/2022                 Passed - In person appointment or virtual visit in the past 12 mos or appointment in next 3 mos       Recent Outpatient Visits              4 days ago Overflow incontinence of urine    [facility], [address] [facility] Cristina Bustos MD    Office Visit    1 week ago Palpitations    [facility], [address] [facility], [place] Danniel Given., APRN    Office Visit    1 month ago     [address] [place], [place]    Office Visit    1 month ago       [place], [place]    Office Visit    1 month ago       [place]    Office Visit     Future Appointments         Provider Department Appt Notes    In 2 weeks Porsche Aldana, [address] [facility], [address] [facility] Female incontinence    In 10 months Brent Rangel MD [facility], [address], [place] 1 yr f/u                     Recent Outpatient Visits              4 days ago Overflow incontinence of urine    [facility]    Office Visit    1 week ago Palpitations    [facility] Fester, [place], Anice Pique., APRN    Office Visit    1 month ago     [address] [place], [place]    Office Visit    1 month ago       [facility] [place] [place]    Office Visit    1 [place] ago       [place]    [place] Visit            Future Appointments         Provider Department Appt Notes    In 2 weeks Brian Alvarez, [address] [facility], [facility]    In 10 months Heather Boucher MD [facility], [address] [facility] 1 yr f/u

## 2022-09-30 ENCOUNTER — APPOINTMENT (OUTPATIENT)
Dept: GENERAL RADIOLOGY | Facility: HOSPITAL | Age: 77
End: 2022-09-30
Attending: EMERGENCY MEDICINE
Payer: MEDICARE

## 2022-09-30 ENCOUNTER — HOSPITAL ENCOUNTER (EMERGENCY)
Facility: HOSPITAL | Age: 77
Discharge: HOME | End: 2022-09-30
Attending: EMERGENCY MEDICINE
Payer: MEDICARE

## 2022-09-30 VITALS
OXYGEN SATURATION: 96 % | TEMPERATURE: 98 F | HEART RATE: 101 BPM | RESPIRATION RATE: 20 BRPM | DIASTOLIC BLOOD PRESSURE: 53 MMHG | SYSTOLIC BLOOD PRESSURE: 150 MMHG | BODY MASS INDEX: 22 KG/M2 | WEIGHT: 124 LBS

## 2022-09-30 DIAGNOSIS — J06.9 UPPER RESPIRATORY TRACT INFECTION, UNSPECIFIED TYPE: Primary | ICD-10-CM

## 2022-09-30 PROCEDURE — 71045 X-RAY EXAM CHEST 1 VIEW: CPT | Performed by: EMERGENCY MEDICINE

## 2022-09-30 PROCEDURE — 99283 EMERGENCY DEPT VISIT LOW MDM: CPT

## 2022-10-01 ENCOUNTER — NURSE TRIAGE (OUTPATIENT)
Dept: INTERNAL MEDICINE CLINIC | Facility: CLINIC | Age: 77
End: 2022-10-01

## 2022-10-07 ENCOUNTER — TELEPHONE (OUTPATIENT)
Dept: INTERNAL MEDICINE CLINIC | Facility: CLINIC | Age: 77
End: 2022-10-07

## 2022-10-07 NOTE — TELEPHONE ENCOUNTER
Please call pt. I would like to see her this upcoming week. Can she come Tuesday to [facility] or Thursday to [place] (in the [facility] appt)?

## 2022-10-10 NOTE — TELEPHONE ENCOUNTER
Verified name and .     Patient scheduled per Dr. Hernández Lax:    Future Appointments   Date Time Provider Carmen Chapman   10/11/2022 11:20 AM MD DEB[facility] [place] [facility]   2022 10:00 AM JennaYanick APRN [facility]   2023  9:30 AM Michael Chong MD THE [facility]

## 2022-10-11 ENCOUNTER — OFFICE VISIT (OUTPATIENT)
Facility: CLINIC | Age: 77
End: 2022-10-11
Payer: MEDICARE

## 2022-10-11 VITALS
RESPIRATION RATE: 18 BRPM | SYSTOLIC BLOOD PRESSURE: 118 MMHG | OXYGEN SATURATION: 98 % | HEIGHT: 63 IN | WEIGHT: 122 LBS | HEART RATE: 77 BPM | DIASTOLIC BLOOD PRESSURE: 60 MMHG | BODY MASS INDEX: 21.62 KG/M2

## 2022-10-11 DIAGNOSIS — I10 ESSENTIAL HYPERTENSION: ICD-10-CM

## 2022-10-11 DIAGNOSIS — I47.1 ATRIAL TACHYCARDIA (HCC): Primary | ICD-10-CM

## 2022-10-11 DIAGNOSIS — R19.5 POSITIVE FIT (FECAL IMMUNOCHEMICAL TEST): ICD-10-CM

## 2022-10-11 DIAGNOSIS — M81.0 SENILE OSTEOPOROSIS: ICD-10-CM

## 2022-10-11 PROBLEM — I47.19 ATRIAL TACHYCARDIA (HCC): Status: ACTIVE | Noted: 2022-10-11

## 2022-10-11 PROBLEM — I47.19 ATRIAL TACHYCARDIA: Status: ACTIVE | Noted: 2022-10-11

## 2022-10-11 PROCEDURE — 3008F BODY MASS INDEX DOCD: CPT | Performed by: INTERNAL MEDICINE

## 2022-10-11 PROCEDURE — 99215 OFFICE O/P EST HI 40 MIN: CPT | Performed by: INTERNAL MEDICINE

## 2022-10-11 PROCEDURE — 1126F AMNT PAIN NOTED NONE PRSNT: CPT | Performed by: INTERNAL MEDICINE

## 2022-10-11 PROCEDURE — 3074F SYST BP LT 130 MM HG: CPT | Performed by: INTERNAL MEDICINE

## 2022-10-11 PROCEDURE — 3078F DIAST BP <80 MM HG: CPT | Performed by: INTERNAL MEDICINE

## 2022-10-11 RX ORDER — METOPROLOL SUCCINATE 25 MG/1
50 TABLET, EXTENDED RELEASE ORAL DAILY
Qty: 90 TABLET | Refills: 1 | COMMUNITY
Start: 2022-10-11

## 2022-10-11 NOTE — ASSESSMENT & PLAN NOTE
Patient was unable to do the Reclast infusion in August because she was recovering from knee surgery. She will call to schedule it now.

## 2022-10-11 NOTE — ASSESSMENT & PLAN NOTE
Patient continues to have intermittent brief episodes of palpitations, however she has not had lightheadedness or falls. She started the metoprolol approximately 1 week ago and has not noted any improvement. We will increase the metoprolol to 50 mg daily, keeping in mind that her slowest heart rate goes down to 55 bpm.  Refer to cardiology.

## 2022-10-11 NOTE — ASSESSMENT & PLAN NOTE
Status post panendoscopy 5/10/2022.   Patient states that she has intermittent hemorrhoidal bleeding and that might be the cause of the positive test.

## 2022-10-12 ENCOUNTER — TELEPHONE (OUTPATIENT)
Dept: INTERNAL MEDICINE CLINIC | Facility: CLINIC | Age: 77
End: 2022-10-12

## 2022-10-12 NOTE — TELEPHONE ENCOUNTER
Patient is requesting to have the referral for [address] [place] [address] faxed to [phone]. Patient has an appointment with Dr. Petty Cotto on 10/18.

## 2022-10-21 ENCOUNTER — TELEPHONE (OUTPATIENT)
Dept: INTERNAL MEDICINE CLINIC | Facility: CLINIC | Age: 77
End: 2022-10-21

## 2022-11-01 ENCOUNTER — OFFICE VISIT (OUTPATIENT)
Dept: SURGERY | Facility: CLINIC | Age: 77
End: 2022-11-01
Payer: MEDICARE

## 2022-11-01 VITALS — DIASTOLIC BLOOD PRESSURE: 71 MMHG | SYSTOLIC BLOOD PRESSURE: 111 MMHG | HEART RATE: 76 BPM

## 2022-11-01 DIAGNOSIS — N13.30 HYDRONEPHROSIS OF LEFT KIDNEY: ICD-10-CM

## 2022-11-01 DIAGNOSIS — R31.0 GROSS HEMATURIA: Primary | ICD-10-CM

## 2022-11-01 DIAGNOSIS — N39.41 URGE INCONTINENCE: ICD-10-CM

## 2022-11-01 PROCEDURE — 3078F DIAST BP <80 MM HG: CPT | Performed by: NURSE PRACTITIONER

## 2022-11-01 PROCEDURE — 3074F SYST BP LT 130 MM HG: CPT | Performed by: NURSE PRACTITIONER

## 2022-11-01 PROCEDURE — 99214 OFFICE O/P EST MOD 30 MIN: CPT | Performed by: NURSE PRACTITIONER

## 2022-11-02 ENCOUNTER — LAB ENCOUNTER (OUTPATIENT)
Dept: LAB | Facility: HOSPITAL | Age: 77
End: 2022-11-02
Attending: NURSE PRACTITIONER
Payer: MEDICARE

## 2022-11-02 DIAGNOSIS — R31.0 GROSS HEMATURIA: ICD-10-CM

## 2022-11-02 PROCEDURE — 87086 URINE CULTURE/COLONY COUNT: CPT

## 2022-11-02 PROCEDURE — 88108 CYTOPATH CONCENTRATE TECH: CPT

## 2022-11-02 PROCEDURE — 81001 URINALYSIS AUTO W/SCOPE: CPT

## 2022-11-03 LAB
BILIRUB UR QL: NEGATIVE
COLOR UR: YELLOW
GLUCOSE UR-MCNC: NEGATIVE MG/DL
KETONES UR-MCNC: NEGATIVE MG/DL
NITRITE UR QL STRIP.AUTO: NEGATIVE
PH UR: 6 [PH] (ref 5–8)
PROT UR-MCNC: 30 MG/DL
SP GR UR STRIP: 1.02 (ref 1–1.03)
UROBILINOGEN UR STRIP-ACNC: <2
VIT C UR-MCNC: NEGATIVE MG/DL

## 2022-11-21 ENCOUNTER — HOSPITAL ENCOUNTER (OUTPATIENT)
Dept: CT IMAGING | Facility: HOSPITAL | Age: 77
Discharge: HOME | End: 2022-11-21
Attending: NURSE PRACTITIONER
Payer: MEDICARE

## 2022-11-21 ENCOUNTER — PROCEDURE (OUTPATIENT)
Dept: SURGERY | Facility: CLINIC | Age: 77
End: 2022-11-21
Payer: MEDICARE

## 2022-11-21 VITALS
SYSTOLIC BLOOD PRESSURE: 114 MMHG | DIASTOLIC BLOOD PRESSURE: 64 MMHG | HEART RATE: 68 BPM | WEIGHT: 122 LBS | RESPIRATION RATE: 16 BRPM | HEIGHT: 63 IN | BODY MASS INDEX: 21.62 KG/M2

## 2022-11-21 DIAGNOSIS — R31.0 GROSS HEMATURIA: ICD-10-CM

## 2022-11-21 DIAGNOSIS — R31.0 GROSS HEMATURIA: Primary | ICD-10-CM

## 2022-11-21 DIAGNOSIS — N39.41 URGE INCONTINENCE: ICD-10-CM

## 2022-11-21 LAB
CREAT BLD-MCNC: 0.7 MG/DL
GFR SERPLBLD BASED ON 1.73 SQ M-ARVRAT: 88 ML/MIN/1.73M2 (ref 60–?)

## 2022-11-21 PROCEDURE — 52000 CYSTOURETHROSCOPY: CPT | Performed by: UROLOGY

## 2022-11-21 PROCEDURE — 82565 ASSAY OF CREATININE: CPT

## 2022-11-21 PROCEDURE — 3008F BODY MASS INDEX DOCD: CPT | Performed by: UROLOGY

## 2022-11-21 PROCEDURE — 76377 3D RENDER W/INTRP POSTPROCES: CPT | Performed by: NURSE PRACTITIONER

## 2022-11-21 PROCEDURE — 3074F SYST BP LT 130 MM HG: CPT | Performed by: UROLOGY

## 2022-11-21 PROCEDURE — 74178 CT ABD&PLV WO CNTR FLWD CNTR: CPT | Performed by: NURSE PRACTITIONER

## 2022-11-21 PROCEDURE — 3078F DIAST BP <80 MM HG: CPT | Performed by: UROLOGY

## 2022-11-21 NOTE — PROCEDURES
CYSTOSCOPY (FEMALE)    PRE-OP DIAGNOSIS: UUI and gross hematuria    POST-OP DIAGNOSIS: same    PROCEDURE: Cystsocopy    SURGEON: Zehra Moseley MD    ASSISTANT: none     EBL: minimal    FINDINGS:   1. Urethra: No urethral lesions, no urethral strictures  2. Bladder: Bilateral ureteral orifices in orthotopic position with efflux, right UO slightly smaller than left; no suspicious or concerning erythematous lesions, no papillary bladder tumors, no stones, small cellule at posterior bladder wall  3. Retroflexion: no abnormalities   4. Other findings: none    INDICATIONS: Gross hematuria with CTU pending. Cytology negative. UCx negative ( MDR Pseudomonas; 2022 pansensitive pseudomonas, 2021 - MDR ecoli). Prior smoker. Also with UUI 5-6 diapers a day. PROCEDURE: Patient was brought to the procedure suite and a timeout was performed identifying the patient,  and procedure being performed. The risks of the procedure were once again detailed to the patient including bleeding, infection, dysuria. The patient agreed to proceed. The patient had a negative urinalysis. No antibiotics were given to patient prior to this procedure. She was placed in a supine position on the table and a flexible cystoscope was inserted per urethra. There were no obvious urethral lesions or strictures. Once in the bladder we performed a full diagnostic/surveillance cystoscopy which demonstrated Bilateral ureteral orifices in orthotopic position with efflux, right UO slightly smaller than left; no suspicious or concerning erythematous lesions, no papillary bladder tumors, no stones, small cellule at posterior bladder wall. On retroflexion we noted no abnormalities. The scope was then carefully removed and once again no urethral abnormalities were noted. There were no complications after this procedure and the patient tolerated the procedure without issue.     IMPRESSION: Gross hematuria with negative cytology and cystoscopy today demonstrating no gross abnormalities. CTU pending. Treatment options for urgency urinary incontinence include behavioral (timed voiding, double voiding, avoiding bladder irritants), antimuscarinics (side effects include dry eyes, dry mouth, constipation, mental fogginess), beta 3 agonist (the side effects include hypertension), bladder Botox, PTNS, sacral neuromodulation. Antimuscarinics and beta 3 agonist take 6 to 8 weeks to start working. Bladder Botox has the side effect of urinary retention in 10 to 12% of patients and does not start working for about 2 weeks post procedure. Additionally it wears off with time and repeat treatments may be required every 3 to 12 months. Sacroneuromodulation is a surgical treatment, and that PTNS requires a significant amount of time commitment coming in every week for 12 weeks followed by monthly treatments if it is effective. If CTU negative, can start with medical therapy. May need UDS in future if symptoms do not improve. PLAN:    1. CTU  2.  Follow up with Alejandro Martin NP regarding CTU and urinary issues

## 2022-11-22 ENCOUNTER — OFFICE VISIT (OUTPATIENT)
Facility: CLINIC | Age: 77
End: 2022-11-22
Payer: MEDICARE

## 2022-11-22 VITALS
BODY MASS INDEX: 22.32 KG/M2 | OXYGEN SATURATION: 99 % | DIASTOLIC BLOOD PRESSURE: 58 MMHG | RESPIRATION RATE: 16 BRPM | HEIGHT: 63 IN | SYSTOLIC BLOOD PRESSURE: 112 MMHG | HEART RATE: 84 BPM | WEIGHT: 126 LBS

## 2022-11-22 DIAGNOSIS — Z12.31 BREAST CANCER SCREENING BY MAMMOGRAM: ICD-10-CM

## 2022-11-22 DIAGNOSIS — I47.1 ATRIAL TACHYCARDIA (HCC): Primary | ICD-10-CM

## 2022-11-22 DIAGNOSIS — N39.490 OVERFLOW INCONTINENCE OF URINE: ICD-10-CM

## 2022-11-22 DIAGNOSIS — I10 ESSENTIAL HYPERTENSION: ICD-10-CM

## 2022-11-22 PROBLEM — R00.2 PALPITATIONS: Status: RESOLVED | Noted: 2022-09-22 | Resolved: 2022-11-22

## 2022-11-22 PROBLEM — Z01.818 PRE-OP EXAM: Status: RESOLVED | Noted: 2022-06-07 | Resolved: 2022-11-22

## 2022-11-22 PROBLEM — R10.13 EPIGASTRIC PAIN: Status: RESOLVED | Noted: 2021-09-30 | Resolved: 2022-11-22

## 2022-11-22 PROBLEM — L82.1 SEBORRHEIC KERATOSIS: Status: RESOLVED | Noted: 2022-04-21 | Resolved: 2022-11-22

## 2022-11-22 PROCEDURE — 99214 OFFICE O/P EST MOD 30 MIN: CPT | Performed by: INTERNAL MEDICINE

## 2022-11-22 PROCEDURE — 3078F DIAST BP <80 MM HG: CPT | Performed by: INTERNAL MEDICINE

## 2022-11-22 PROCEDURE — 3008F BODY MASS INDEX DOCD: CPT | Performed by: INTERNAL MEDICINE

## 2022-11-22 PROCEDURE — 1126F AMNT PAIN NOTED NONE PRSNT: CPT | Performed by: INTERNAL MEDICINE

## 2022-11-22 PROCEDURE — 3074F SYST BP LT 130 MM HG: CPT | Performed by: INTERNAL MEDICINE

## 2022-11-22 RX ORDER — HYDROCHLOROTHIAZIDE 12.5 MG/1
12.5 CAPSULE, GELATIN COATED ORAL DAILY
Qty: 90 CAPSULE | Refills: 1 | Status: SHIPPED | OUTPATIENT
Start: 2022-11-22

## 2022-11-22 NOTE — ASSESSMENT & PLAN NOTE
According a paper that patient with her from her cardiologist, patient had a cardiac PET scan on 11/11/2022. I am unable to find a record of this in epic or as a scanned document.   Patient does have a follow-up appointment with Dr. Penny Clemens in early January

## 2022-11-22 NOTE — ASSESSMENT & PLAN NOTE
I reviewed the patient's cystoscopy report from yesterday. It was normal.  Patient will follow-up with urology.

## 2022-12-25 RX ORDER — POTASSIUM CHLORIDE 750 MG/1
TABLET, EXTENDED RELEASE ORAL
Qty: 90 TABLET | Refills: 1 | Status: SHIPPED | OUTPATIENT
Start: 2022-12-25

## 2022-12-25 NOTE — TELEPHONE ENCOUNTER
Please review. Protocol failed / No protocol. Routing to Dr. Judit Barrera ( on call 12/25) for Dr. Leesa Kwan. Patient on hydrochlorothiazide.     Requested Prescriptions   Pending Prescriptions Disp Refills    POTASSIUM CHLORIDE 10 MEQ Oral Tab CR [Pharmacy Med Name: POTASSIUM CHLORIDE ER 10 MEQ Tablet Extended Release] 90 tablet 1     Sig: TAKE 1 TABLET EVERY DAY       There is no refill protocol information for this order         Future Appointments         Provider Department Appt Notes    In 1 month MD Jun Jovel [facility] - 3rd floor, [facility] Return in about 3 months (around 2/22/2023) for a Medicare physical.    In 7 months Isidro Gomes MD [facility], [address], Jean 1 yr f/u           Recent Outpatient Visits              1 month ago Atrial tachycardia [facility])    [employer], [address], Wayne Nicole MD    Office Visit    1 month ago Gross hematuria    [facility], [address], [employer], APRN    Office Visit    2 months ago Atrial tachycardia [facility])    [employer], [address] [facility], Wayne Nicole MD    Office Visit    3 months ago Overflow incontinence of urine    Jun Fraire, [address] [facility], Yesenia Aviles MD    Office Visit    3 months ago Palpitations    [facility], [address] [place] [address]., APRN    Office Visit

## 2023-01-11 ENCOUNTER — TELEPHONE (OUTPATIENT)
Dept: CASE MANAGEMENT | Age: 78
End: 2023-01-11

## 2023-01-11 ENCOUNTER — PATIENT OUTREACH (OUTPATIENT)
Dept: CASE MANAGEMENT | Age: 78
End: 2023-01-11

## 2023-01-11 DIAGNOSIS — M17.12 PRIMARY OSTEOARTHRITIS OF LEFT KNEE: Primary | ICD-10-CM

## 2023-01-11 NOTE — PROGRESS NOTES
VM received; pt requesting a referral (dc 09/30) - this is not an HFU - can transfer pt to her PCP  Transferred pt to her PCP  Closing encounter

## 2023-01-11 NOTE — TELEPHONE ENCOUNTER
Dr. Mcintosh Spine,     Patient is requesting a referral for follow up visits with   Haydee Wang. Pended referral please review diagnosis and sign off if you agree. Thank you.   Shun Matos

## 2023-01-14 ENCOUNTER — HOSPITAL ENCOUNTER (OUTPATIENT)
Dept: MAMMOGRAPHY | Facility: HOSPITAL | Age: 78
Discharge: HOME | End: 2023-01-14
Attending: INTERNAL MEDICINE
Payer: MEDICARE

## 2023-01-14 ENCOUNTER — LAB ENCOUNTER (OUTPATIENT)
Dept: LAB | Facility: HOSPITAL | Age: 78
End: 2023-01-14
Attending: INTERNAL MEDICINE
Payer: MEDICARE

## 2023-01-14 DIAGNOSIS — Z12.31 BREAST CANCER SCREENING BY MAMMOGRAM: ICD-10-CM

## 2023-01-14 DIAGNOSIS — E03.9 ACQUIRED HYPOTHYROIDISM: ICD-10-CM

## 2023-01-14 LAB
T4 FREE SERPL-MCNC: 0.8 NG/DL (ref 0.8–1.7)
TSI SER-ACNC: 17.1 MIU/ML (ref 0.36–3.74)

## 2023-01-14 PROCEDURE — 77063 BREAST TOMOSYNTHESIS BI: CPT | Performed by: INTERNAL MEDICINE

## 2023-01-14 PROCEDURE — 77067 SCR MAMMO BI INCL CAD: CPT | Performed by: INTERNAL MEDICINE

## 2023-01-14 PROCEDURE — 84443 ASSAY THYROID STIM HORMONE: CPT

## 2023-01-14 PROCEDURE — 36415 COLL VENOUS BLD VENIPUNCTURE: CPT

## 2023-01-14 PROCEDURE — 84439 ASSAY OF FREE THYROXINE: CPT

## 2023-01-15 NOTE — PROGRESS NOTES
Please call pt: Your thyroid level is very low. Are you taking the thyroid medication (levothyroxine) every day?

## 2023-01-18 NOTE — PROGRESS NOTES
Please call pt: I would like to increase her thyroid medication. I sent the prescription to the mail order. Repeat the blood test in April.

## 2023-01-20 ENCOUNTER — LAB ENCOUNTER (OUTPATIENT)
Dept: LAB | Facility: HOSPITAL | Age: 78
End: 2023-01-20
Attending: INTERNAL MEDICINE
Payer: MEDICARE

## 2023-01-20 DIAGNOSIS — R82.71 BACTERIA IN URINE: ICD-10-CM

## 2023-01-20 PROCEDURE — 87186 SC STD MICRODIL/AGAR DIL: CPT

## 2023-01-20 PROCEDURE — 87077 CULTURE AEROBIC IDENTIFY: CPT

## 2023-01-20 PROCEDURE — 87086 URINE CULTURE/COLONY COUNT: CPT

## 2023-01-22 ENCOUNTER — TELEPHONE (OUTPATIENT)
Facility: CLINIC | Age: 78
End: 2023-01-22

## 2023-01-22 NOTE — TELEPHONE ENCOUNTER
Pharmacist called regarding pt's age and question of macrobid.   Advised to fill, since kidney function normal.

## 2023-02-03 NOTE — PAT NURSING NOTE
Spoke with maryann at Dr. Argu[facility] office that we have been unable to contact patient. Office will try to contact patient and have patient call PAT. No - the patient is unable to be screened due to medical condition

## 2023-02-21 ENCOUNTER — OFFICE VISIT (OUTPATIENT)
Facility: CLINIC | Age: 78
End: 2023-02-21

## 2023-02-21 VITALS
OXYGEN SATURATION: 97 % | HEART RATE: 53 BPM | WEIGHT: 131 LBS | SYSTOLIC BLOOD PRESSURE: 100 MMHG | BODY MASS INDEX: 23.21 KG/M2 | RESPIRATION RATE: 18 BRPM | DIASTOLIC BLOOD PRESSURE: 58 MMHG | HEIGHT: 63 IN

## 2023-02-21 DIAGNOSIS — H43.399 VITREOUS FLOATERS, UNSPECIFIED LATERALITY: ICD-10-CM

## 2023-02-21 DIAGNOSIS — M81.0 AGE-RELATED OSTEOPOROSIS WITHOUT CURRENT PATHOLOGICAL FRACTURE: ICD-10-CM

## 2023-02-21 DIAGNOSIS — Z00.00 ENCOUNTER FOR MEDICARE ANNUAL WELLNESS EXAM: Primary | ICD-10-CM

## 2023-02-21 DIAGNOSIS — E78.5 HYPERLIPIDEMIA, UNSPECIFIED HYPERLIPIDEMIA TYPE: ICD-10-CM

## 2023-02-21 DIAGNOSIS — R07.81 RIB PAIN ON RIGHT SIDE: ICD-10-CM

## 2023-02-21 DIAGNOSIS — E03.9 ACQUIRED HYPOTHYROIDISM: ICD-10-CM

## 2023-02-21 DIAGNOSIS — I67.9 CEREBROVASCULAR DISEASE: ICD-10-CM

## 2023-02-21 DIAGNOSIS — M81.0 SENILE OSTEOPOROSIS: ICD-10-CM

## 2023-02-21 DIAGNOSIS — H40.009 PREGLAUCOMA, UNSPECIFIED LATERALITY: ICD-10-CM

## 2023-02-21 DIAGNOSIS — K59.04 CHRONIC IDIOPATHIC CONSTIPATION: ICD-10-CM

## 2023-02-21 DIAGNOSIS — D64.9 ANEMIA, UNSPECIFIED TYPE: ICD-10-CM

## 2023-02-21 DIAGNOSIS — N39.490 OVERFLOW INCONTINENCE OF URINE: ICD-10-CM

## 2023-02-21 DIAGNOSIS — I47.1 ATRIAL TACHYCARDIA (HCC): ICD-10-CM

## 2023-02-21 DIAGNOSIS — M15.9 PRIMARY OSTEOARTHRITIS INVOLVING MULTIPLE JOINTS: ICD-10-CM

## 2023-02-21 DIAGNOSIS — I10 ESSENTIAL HYPERTENSION: ICD-10-CM

## 2023-02-21 PROBLEM — M15.0 PRIMARY OSTEOARTHRITIS INVOLVING MULTIPLE JOINTS: Status: ACTIVE | Noted: 2020-12-21

## 2023-02-21 PROBLEM — M79.89 LEFT LEG SWELLING: Status: RESOLVED | Noted: 2019-11-19 | Resolved: 2023-02-21

## 2023-02-21 PROBLEM — M25.559 HIP PAIN: Status: RESOLVED | Noted: 2022-05-23 | Resolved: 2023-02-21

## 2023-02-21 PROBLEM — M17.12 OSTEOARTHRITIS OF LEFT KNEE: Status: RESOLVED | Noted: 2022-04-21 | Resolved: 2023-02-21

## 2023-02-21 PROBLEM — M17.12 PRIMARY OSTEOARTHRITIS OF LEFT KNEE: Status: RESOLVED | Noted: 2022-06-23 | Resolved: 2023-02-21

## 2023-02-21 PROBLEM — M17.11 OSTEOARTHRITIS OF RIGHT KNEE: Status: RESOLVED | Noted: 2022-06-07 | Resolved: 2023-02-21

## 2023-02-21 PROBLEM — R19.5 POSITIVE FIT (FECAL IMMUNOCHEMICAL TEST): Status: RESOLVED | Noted: 2022-10-11 | Resolved: 2023-02-21

## 2023-02-21 PROCEDURE — 96160 PT-FOCUSED HLTH RISK ASSMT: CPT | Performed by: INTERNAL MEDICINE

## 2023-02-21 PROCEDURE — 99397 PER PM REEVAL EST PAT 65+ YR: CPT | Performed by: INTERNAL MEDICINE

## 2023-02-21 PROCEDURE — 3008F BODY MASS INDEX DOCD: CPT | Performed by: INTERNAL MEDICINE

## 2023-02-21 PROCEDURE — G0439 PPPS, SUBSEQ VISIT: HCPCS | Performed by: INTERNAL MEDICINE

## 2023-02-21 PROCEDURE — 1126F AMNT PAIN NOTED NONE PRSNT: CPT | Performed by: INTERNAL MEDICINE

## 2023-02-21 PROCEDURE — 3074F SYST BP LT 130 MM HG: CPT | Performed by: INTERNAL MEDICINE

## 2023-02-21 PROCEDURE — 3078F DIAST BP <80 MM HG: CPT | Performed by: INTERNAL MEDICINE

## 2023-02-21 NOTE — PATIENT INSTRUCTIONS
rBooke Roblero's SCREENING SCHEDULE   Tests on this list are recommended by your physician but may not be covered, or covered at this frequency, by your insurer. Please check with your insurance carrier before scheduling to verify coverage. PREVENTATIVE SERVICES FREQUENCY &  COVERAGE DETAILS LAST COMPLETION DATE   Diabetes Screening    Fasting Blood Sugar /  Glucose    One screening every 12 months if never tested or if previously tested but not diagnosed with pre-diabetes   One screening every 6 months if diagnosed with pre-diabetes Lab Results   Component Value Date    GLU 93 09/15/2022        Cardiovascular Disease Screening    Lipid Panel  Cholesterol  Lipoprotein (HDL)  Triglycerides Covered every 5 years for all Medicare beneficiaries without apparent signs or symptoms of cardiovascular disease Lab Results   Component Value Date    CHOLEST 238 (H) 03/08/2022    HDL 87 (H) 03/08/2022     (H) 03/08/2022    TRIG 65 03/08/2022         Electrocardiogram (EKG)   Covered if needed at Welcome to Medicare, and non-screening if indicated for medical reasons 05/02/2022      Ultrasound Screening for Abdominal Aortic Aneurysm (AAA) Covered once in a lifetime for one of the following risk factors    Men who are 73-68 years old and have ever smoked    Anyone with a family history -     Colorectal Cancer Screening  Covered for ages 52-80; only need ONE of the following:    Colonoscopy   Covered every 10 years    Covered every 2 years if patient is at high risk or previous colonoscopy was abnormal 05/10/2022    No recommendations at this time    Flexible Sigmoidoscopy   Covered every 4 years -    Fecal Occult Blood Test Covered annually 09/21/2022   Bone Density Screening    Bone density screening    Covered every 2 years after age 72 if diagnosed with risk of osteoporosis or estrogen deficiency.     Covered yearly for long-term glucocorticoid medication use (Steroids) Last Dexa Scan:    XR DEXA BONE DENSITOMETRY (CPT=77080) 05/10/2022      No recommendations at this time   Pap and Pelvic    Pap   Covered every 2 years for women at normal risk; Annually if at high risk -  No recommendations at this time    Chlamydia Annually if high risk 09/21/2022  No recommendations at this time   Screening Mammogram    Mammogram     Recommend annually for all female patients aged 36 and older    One baseline mammogram covered for patients aged 32-38 01/14/2023    Mammogram due on 01/14/2024    Immunizations    Influenza Covered once per flu season  Please get every year 09/22/2022  No recommendations at this time    Pneumococcal Each vaccine ([id number] & [id number]) covered once after 65 Prevnar 13: 08/10/2017    [id number]: 12/11/2012     No recommendations at this time    Hepatitis B One screening covered for patients with certain risk factors   -  No recommendations at this time    Tetanus Toxoid Not covered by Medicare Part B unless medically necessary (cut with metal); may be covered with your pharmacy prescription benefits -    Tetanus, Diptheria and Pertusis TD and TDaP Not covered by Medicare Part B -  No recommendations at this time    Zoster Not covered by Medicare Part B; may be covered with your pharmacy  prescription benefits 06/06/2013  Zoster Vaccines(2 of 3) due on 08/01/2013       Annual Monitoring of Persistent Medications (ACE/ARB, digoxin diuretics, anticonvulsants)    Potassium Annually Lab Results   Component Value Date    K 3.9 09/15/2022         Creatinine   Annually Lab Results   Component Value Date    CREATSERUM 0.80 09/15/2022         BUN Annually Lab Results   Component Value Date    BUN 12 09/15/2022       Drug Serum Conc Annually No results found for: DIGOXIN, DIG, VALP           Do fasting labs in mid-April. Fast for 12 hours. Water is okay. You can walk-in or schedule an appointment.   Do not take vitamins or supplements for 3 days prior to the blood test.

## 2023-02-21 NOTE — ASSESSMENT & PLAN NOTE
Controlled. Continue present management. Patient's recent TSH was high. Dose was increased. Patient is due for follow-up TSH in April.

## 2023-02-21 NOTE — ASSESSMENT & PLAN NOTE
No recent prolonged symptoms. Continue metoprolol. Patient will call if she has worsening of her symptoms.

## 2023-02-22 DIAGNOSIS — E78.00 HYPERCHOLESTEROLEMIA: ICD-10-CM

## 2023-02-22 RX ORDER — OMEPRAZOLE 20 MG/1
20 CAPSULE, DELAYED RELEASE ORAL
Qty: 90 CAPSULE | Refills: 1 | Status: SHIPPED | OUTPATIENT
Start: 2023-02-22

## 2023-02-22 NOTE — TELEPHONE ENCOUNTER
Please review. Protocol failed/ No protocol      Requested Prescriptions   Pending Prescriptions Disp Refills    ATORVASTATIN 40 MG Oral Tab [Pharmacy Med Name: ATORVASTATIN CALCIUM 40 MG Tablet] 90 tablet 1     Sig: TAKE 1 TABLET EVERY NIGHT       Cholesterol Medication Protocol Failed - 2/22/2023 11:52 AM        Failed - Last LDL < 130     Lab Results   Component Value Date     (H) 03/08/2022             Passed - ALT in past 12 months        Passed - LDL in past 12 months        Passed - Last ALT < 80     Lab Results   Component Value Date    ALT 15 09/15/2022             Passed - In person appointment or virtual visit in the past 12 mos or appointment in next 3 mos     Recent Outpatient Visits              Yesterday Encounter for Medicare annual wellness exam    Konstantin Tamez MD    Office Visit    3 months ago Atrial tachycardia [facility])    [address], Tami Espinoza MD    Office Visit    3 months ago [facility], [address], [place], AMIRAH Emanuel    Office Visit    4 months ago Atrial tachycardia [facility])    [address], Tami Espinoza MD    Office Visit    5 months ago Overflow incontinence of urine    [address] [facility], Ajith Lauren MD    Office Visit          Future Appointments         Provider Department Appt Notes    In 6 months Kaia Harden MD [address], [address], [employer] 1 yr f/u                    Future Appointments         Provider Department Appt Notes    In 6 months Kaia Harden MD [address], [address], [employer] 1 yr f/u             Recent Outpatient Visits              Yesterday Encounter for [facility] annual wellness exam    [address], Tami Espinoza MD    Office Visit    3 months ago Atrial tachycardia [facility])    [address], Elmer Khan MD    Office Visit    3 months ago Gross hematuria    [facility], [address], [facility], Marielle Daniel, AMIRAH    Office Visit    4 months ago Atrial tachycardia [facility])    [address], Elmer Khan MD    Office Visit    5 months ago Overflow incontinence of urine    [address], [facility] MD Millie    Office Visit

## 2023-02-22 NOTE — TELEPHONE ENCOUNTER
Refill passed per [place] [employer] [facility] protocol.       Requested Prescriptions   Pending Prescriptions Disp Refills    OMEPRAZOLE 20 MG Oral Capsule Delayed Release [Pharmacy Med Name: OMEPRAZOLE 20 MG Capsule Delayed Release] 90 capsule 0     Sig: TAKE 1 CAPSULE EVERY MORNING BEFORE BREAKFAST       Gastrointestional Medication Protocol Passed - 2/22/2023 11:52 AM        Passed - In person appointment or virtual visit in the past 12 mos or appointment in next 3 mos     Recent Outpatient Visits              Yesterday Encounter for Medicare annual wellness exam    Linda Kwan MD    Office Visit    3 months ago Atrial tachycardia [facility])    Ez Forman MD    Office Visit    3 months ago Gross hematuria    [facility], [address], [facility], AMIRAH Willingham    Office Visit    4 months ago Atrial tachycardia [facility])    Ez Forman MD    Office Visit    5 months ago Overflow incontinence of urine    [address] [place] [address], Filiberto Frank MD    Office Visit          Future Appointments         Provider Department Appt Notes    In 6 months MD Gabby Kulkarni, [address], [place] 1 yr f/u                     Future Appointments         Provider Department Appt Notes    In 6 months MD Gabby Kulkarni, [address], [place] 1 yr f/u            Recent Outpatient Visits              Yesterday Encounter for [employer] annual wellness exam    Ez Forman MD    Office Visit    3 months ago Atrial tachycardia [facility])    Ez Forman MD    Office Visit    3 months ago Gross hematuria    [address] [facility] [address] [facility] [address], [employer] APRN    Office Visit    4 months ago Atrial tachycardia [facility])    [employer], Nathanael Fish MD    Office Visit    5 months ago Overflow incontinence of urine    [employer], [facility], Suraj Pinzon MD    Office Visit

## 2023-02-23 RX ORDER — ATORVASTATIN CALCIUM 40 MG/1
TABLET, FILM COATED ORAL
Qty: 90 TABLET | Refills: 1 | Status: SHIPPED | OUTPATIENT
Start: 2023-02-23

## 2023-02-24 ENCOUNTER — LAB ENCOUNTER (OUTPATIENT)
Dept: LAB | Facility: HOSPITAL | Age: 78
End: 2023-02-24
Attending: INTERNAL MEDICINE
Payer: MEDICARE

## 2023-02-24 DIAGNOSIS — D64.9 ANEMIA, UNSPECIFIED TYPE: ICD-10-CM

## 2023-02-24 LAB
DEPRECATED HBV CORE AB SER IA-ACNC: 47.5 NG/ML
FOLATE SERPL-MCNC: 15.5 NG/ML (ref 8.7–?)
IRON SATN MFR SERPL: 28 %
IRON SERPL-MCNC: 108 UG/DL
TIBC SERPL-MCNC: 381 UG/DL (ref 240–450)
TRANSFERRIN SERPL-MCNC: 256 MG/DL (ref 200–360)
VIT B12 SERPL-MCNC: 1400 PG/ML (ref 193–986)

## 2023-02-24 PROCEDURE — 84466 ASSAY OF TRANSFERRIN: CPT

## 2023-02-24 PROCEDURE — 82607 VITAMIN B-12: CPT

## 2023-02-24 PROCEDURE — 82728 ASSAY OF FERRITIN: CPT

## 2023-02-24 PROCEDURE — 83540 ASSAY OF IRON: CPT

## 2023-02-24 PROCEDURE — 82746 ASSAY OF FOLIC ACID SERUM: CPT

## 2023-02-24 PROCEDURE — 36415 COLL VENOUS BLD VENIPUNCTURE: CPT

## 2023-03-29 ENCOUNTER — LAB ENCOUNTER (OUTPATIENT)
Dept: LAB | Facility: HOSPITAL | Age: 78
End: 2023-03-29
Attending: INTERNAL MEDICINE
Payer: MEDICARE

## 2023-03-29 DIAGNOSIS — M81.0 SENILE OSTEOPOROSIS: ICD-10-CM

## 2023-03-29 DIAGNOSIS — E03.9 ACQUIRED HYPOTHYROIDISM: ICD-10-CM

## 2023-03-29 DIAGNOSIS — E78.5 HYPERLIPIDEMIA, UNSPECIFIED HYPERLIPIDEMIA TYPE: ICD-10-CM

## 2023-03-29 LAB
ALBUMIN SERPL-MCNC: 3.8 G/DL (ref 3.4–5)
ALBUMIN/GLOB SERPL: 0.9 {RATIO} (ref 1–2)
ALP LIVER SERPL-CCNC: 53 U/L
ALT SERPL-CCNC: 17 U/L
ANION GAP SERPL CALC-SCNC: 4 MMOL/L (ref 0–18)
AST SERPL-CCNC: 11 U/L (ref 15–37)
BASOPHILS # BLD AUTO: 0.04 X10(3) UL (ref 0–0.2)
BASOPHILS NFR BLD AUTO: 0.6 %
BILIRUB SERPL-MCNC: 1 MG/DL (ref 0.1–2)
BUN BLD-MCNC: 20 MG/DL (ref 7–18)
BUN/CREAT SERPL: 22.5 (ref 10–20)
CALCIUM BLD-MCNC: 9.6 MG/DL (ref 8.5–10.1)
CHLORIDE SERPL-SCNC: 100 MMOL/L (ref 98–112)
CHOLEST SERPL-MCNC: 257 MG/DL (ref ?–200)
CO2 SERPL-SCNC: 33 MMOL/L (ref 21–32)
CREAT BLD-MCNC: 0.89 MG/DL
DEPRECATED RDW RBC AUTO: 45.2 FL (ref 35.1–46.3)
EOSINOPHIL # BLD AUTO: 0.09 X10(3) UL (ref 0–0.7)
EOSINOPHIL NFR BLD AUTO: 1.4 %
ERYTHROCYTE [DISTWIDTH] IN BLOOD BY AUTOMATED COUNT: 12.6 % (ref 11–15)
FASTING PATIENT LIPID ANSWER: YES
FASTING STATUS PATIENT QL REPORTED: YES
GFR SERPLBLD BASED ON 1.73 SQ M-ARVRAT: 66 ML/MIN/1.73M2 (ref 60–?)
GLOBULIN PLAS-MCNC: 4.2 G/DL (ref 2.8–4.4)
GLUCOSE BLD-MCNC: 98 MG/DL (ref 70–99)
HCT VFR BLD AUTO: 37.1 %
HDLC SERPL-MCNC: 95 MG/DL (ref 40–59)
HGB BLD-MCNC: 11.9 G/DL
IMM GRANULOCYTES # BLD AUTO: 0.01 X10(3) UL (ref 0–1)
IMM GRANULOCYTES NFR BLD: 0.2 %
LDLC SERPL CALC-MCNC: 153 MG/DL (ref ?–100)
LYMPHOCYTES # BLD AUTO: 2.28 X10(3) UL (ref 1–4)
LYMPHOCYTES NFR BLD AUTO: 36.4 %
MCH RBC QN AUTO: 31.3 PG (ref 26–34)
MCHC RBC AUTO-ENTMCNC: 32.1 G/DL (ref 31–37)
MCV RBC AUTO: 97.6 FL
MONOCYTES # BLD AUTO: 0.51 X10(3) UL (ref 0.1–1)
MONOCYTES NFR BLD AUTO: 8.1 %
NEUTROPHILS # BLD AUTO: 3.33 X10 (3) UL (ref 1.5–7.7)
NEUTROPHILS # BLD AUTO: 3.33 X10(3) UL (ref 1.5–7.7)
NEUTROPHILS NFR BLD AUTO: 53.3 %
NONHDLC SERPL-MCNC: 162 MG/DL (ref ?–130)
OSMOLALITY SERPL CALC.SUM OF ELEC: 287 MOSM/KG (ref 275–295)
PLATELET # BLD AUTO: 260 10(3)UL (ref 150–450)
POTASSIUM SERPL-SCNC: 3.9 MMOL/L (ref 3.5–5.1)
PROT SERPL-MCNC: 8 G/DL (ref 6.4–8.2)
RBC # BLD AUTO: 3.8 X10(6)UL
SODIUM SERPL-SCNC: 137 MMOL/L (ref 136–145)
T3FREE SERPL-MCNC: 2.99 PG/ML (ref 2.4–4.2)
T4 FREE SERPL-MCNC: 1.7 NG/DL (ref 0.8–1.7)
TRIGL SERPL-MCNC: 59 MG/DL (ref 30–149)
TSI SER-ACNC: 0.08 MIU/ML (ref 0.36–3.74)
VIT D+METAB SERPL-MCNC: 39.1 NG/ML (ref 30–100)
VLDLC SERPL CALC-MCNC: 11 MG/DL (ref 0–30)
WBC # BLD AUTO: 6.3 X10(3) UL (ref 4–11)

## 2023-03-29 PROCEDURE — 36415 COLL VENOUS BLD VENIPUNCTURE: CPT

## 2023-03-29 PROCEDURE — 85025 COMPLETE CBC W/AUTO DIFF WBC: CPT

## 2023-03-29 PROCEDURE — 84443 ASSAY THYROID STIM HORMONE: CPT

## 2023-03-29 PROCEDURE — 84481 FREE ASSAY (FT-3): CPT

## 2023-03-29 PROCEDURE — 80061 LIPID PANEL: CPT

## 2023-03-29 PROCEDURE — 84439 ASSAY OF FREE THYROXINE: CPT

## 2023-03-29 PROCEDURE — 80053 COMPREHEN METABOLIC PANEL: CPT

## 2023-03-29 PROCEDURE — 82306 VITAMIN D 25 HYDROXY: CPT

## 2023-04-03 ENCOUNTER — OFFICE VISIT (OUTPATIENT)
Facility: CLINIC | Age: 78
End: 2023-04-03

## 2023-04-03 VITALS
BODY MASS INDEX: 23 KG/M2 | HEIGHT: 63 IN | OXYGEN SATURATION: 99 % | DIASTOLIC BLOOD PRESSURE: 60 MMHG | RESPIRATION RATE: 16 BRPM | HEART RATE: 86 BPM | SYSTOLIC BLOOD PRESSURE: 102 MMHG

## 2023-04-03 DIAGNOSIS — E03.9 ACQUIRED HYPOTHYROIDISM: ICD-10-CM

## 2023-04-03 DIAGNOSIS — I10 ESSENTIAL HYPERTENSION: ICD-10-CM

## 2023-04-03 DIAGNOSIS — M17.11 OSTEOARTHRITIS OF RIGHT KNEE, UNSPECIFIED OSTEOARTHRITIS TYPE: Primary | ICD-10-CM

## 2023-04-03 PROCEDURE — 1126F AMNT PAIN NOTED NONE PRSNT: CPT | Performed by: INTERNAL MEDICINE

## 2023-04-03 PROCEDURE — 99214 OFFICE O/P EST MOD 30 MIN: CPT | Performed by: INTERNAL MEDICINE

## 2023-04-03 PROCEDURE — 3074F SYST BP LT 130 MM HG: CPT | Performed by: INTERNAL MEDICINE

## 2023-04-03 PROCEDURE — 3078F DIAST BP <80 MM HG: CPT | Performed by: INTERNAL MEDICINE

## 2023-04-03 RX ORDER — LEVOTHYROXINE SODIUM 0.12 MG/1
TABLET ORAL
Qty: 45 TABLET | Refills: 1 | Status: SHIPPED | OUTPATIENT
Start: 2023-04-03

## 2023-04-03 RX ORDER — LEVOTHYROXINE SODIUM 137 UG/1
TABLET ORAL
Qty: 50 TABLET | Refills: 1 | COMMUNITY
Start: 2023-04-03

## 2023-04-03 RX ORDER — METOPROLOL SUCCINATE 25 MG/1
50 TABLET, EXTENDED RELEASE ORAL DAILY
Qty: 90 TABLET | Refills: 1 | Status: SHIPPED | OUTPATIENT
Start: 2023-04-03

## 2023-04-03 RX ORDER — HYDROCHLOROTHIAZIDE 12.5 MG/1
12.5 CAPSULE, GELATIN COATED ORAL DAILY
Qty: 90 CAPSULE | Refills: 1 | Status: SHIPPED | OUTPATIENT
Start: 2023-04-03

## 2023-04-03 NOTE — PATIENT INSTRUCTIONS
Please get the new recombinant Shingles vaccine at a pharmacy ([facility]). This is covered under your Medicare prescription plan. I recommend that you ask the pharmacist about the cost to you. Do a non-fasting blood test in July.

## 2023-04-03 NOTE — ASSESSMENT & PLAN NOTE
Form for DMV signed today.   Patient will continue to use a cane and will follow-up with the orthopedist.

## 2023-04-03 NOTE — ASSESSMENT & PLAN NOTE
Patient's recent TSH result was too low. We need to decrease her dose slightly. Unfortunately she seems to need an alternating dose of 125 mcg 3 days a week and 137 mcg 4 days a week. Advised patient to take the medicine on an empty stomach in the morning and not eat for 30 minutes. Recheck blood test in 3 months.

## 2023-06-05 ENCOUNTER — LAB ENCOUNTER (OUTPATIENT)
Dept: LAB | Facility: HOSPITAL | Age: 78
End: 2023-06-05
Attending: INTERNAL MEDICINE
Payer: MEDICARE

## 2023-06-05 ENCOUNTER — NURSE TRIAGE (OUTPATIENT)
Dept: INTERNAL MEDICINE CLINIC | Facility: CLINIC | Age: 78
End: 2023-06-05

## 2023-06-05 DIAGNOSIS — E03.9 ACQUIRED HYPOTHYROIDISM: ICD-10-CM

## 2023-06-05 DIAGNOSIS — H57.12 EYE PAIN, LEFT: Primary | ICD-10-CM

## 2023-06-05 LAB — TSI SER-ACNC: 0.21 MIU/ML (ref 0.36–3.74)

## 2023-06-05 PROCEDURE — 84443 ASSAY THYROID STIM HORMONE: CPT

## 2023-06-05 PROCEDURE — 36415 COLL VENOUS BLD VENIPUNCTURE: CPT

## 2023-06-05 NOTE — TELEPHONE ENCOUNTER
Please call Dr. Melisa Gannon office and see if they can see her. Btw, I do not think they are [place]. Do not send pt to the ER. I have been notified by ER that they can't do much for this. If [facility] can't see her, please call Dr. Juli Richter office.

## 2023-06-05 NOTE — TELEPHONE ENCOUNTER
Attempted to call Dr. Pozo Ulm office x2. Received voicemail for routine line but did not leave voicemail. Next called and selected \"eye emergency\" option. No answer received and left message. Voicemail stated call back would occur within an hour. Asked that they call us or patient. Called patient to provide her with update. Per patient Dr. Pozo [place] office has already offered her a 1pm appointment. FYI.

## 2023-08-01 ENCOUNTER — OFFICE VISIT (OUTPATIENT)
Facility: CLINIC | Age: 78
End: 2023-08-01

## 2023-08-01 ENCOUNTER — HOSPITAL ENCOUNTER (OUTPATIENT)
Dept: GENERAL RADIOLOGY | Age: 78
Discharge: HOME | End: 2023-08-01
Attending: INTERNAL MEDICINE
Payer: MEDICARE

## 2023-08-01 VITALS
RESPIRATION RATE: 18 BRPM | SYSTOLIC BLOOD PRESSURE: 104 MMHG | HEIGHT: 63 IN | HEART RATE: 82 BPM | OXYGEN SATURATION: 96 % | DIASTOLIC BLOOD PRESSURE: 58 MMHG | WEIGHT: 130 LBS | BODY MASS INDEX: 23.04 KG/M2

## 2023-08-01 DIAGNOSIS — M17.11 OSTEOARTHRITIS OF RIGHT KNEE, UNSPECIFIED OSTEOARTHRITIS TYPE: ICD-10-CM

## 2023-08-01 DIAGNOSIS — M54.50 ACUTE LEFT-SIDED LOW BACK PAIN WITHOUT SCIATICA: ICD-10-CM

## 2023-08-01 DIAGNOSIS — E03.9 ACQUIRED HYPOTHYROIDISM: Primary | ICD-10-CM

## 2023-08-01 PROCEDURE — 3078F DIAST BP <80 MM HG: CPT | Performed by: INTERNAL MEDICINE

## 2023-08-01 PROCEDURE — 1125F AMNT PAIN NOTED PAIN PRSNT: CPT | Performed by: INTERNAL MEDICINE

## 2023-08-01 PROCEDURE — 99214 OFFICE O/P EST MOD 30 MIN: CPT | Performed by: INTERNAL MEDICINE

## 2023-08-01 PROCEDURE — 3008F BODY MASS INDEX DOCD: CPT | Performed by: INTERNAL MEDICINE

## 2023-08-01 PROCEDURE — 72110 X-RAY EXAM L-2 SPINE 4/>VWS: CPT | Performed by: INTERNAL MEDICINE

## 2023-08-01 PROCEDURE — 3074F SYST BP LT 130 MM HG: CPT | Performed by: INTERNAL MEDICINE

## 2023-08-01 PROCEDURE — 1159F MED LIST DOCD IN RCRD: CPT | Performed by: INTERNAL MEDICINE

## 2023-08-01 PROCEDURE — 1160F RVW MEDS BY RX/DR IN RCRD: CPT | Performed by: INTERNAL MEDICINE

## 2023-08-01 RX ORDER — LEVOTHYROXINE SODIUM 137 UG/1
TABLET ORAL
Qty: 50 TABLET | Refills: 1 | COMMUNITY
Start: 2023-08-01

## 2023-08-01 RX ORDER — LEVOTHYROXINE SODIUM 0.12 MG/1
TABLET ORAL
Qty: 45 TABLET | Refills: 1 | COMMUNITY
Start: 2023-08-01

## 2023-08-01 NOTE — ASSESSMENT & PLAN NOTE
Patient's recent TSH was slightly suppressed. We will try changing the alternating thyroid dose to 137 mcg Monday Wednesday Friday and 125 mcg Tuesday Thursday Saturday Sunday. Recheck TSH in 3 months. Principal Discharge DX:	Pyelonephritis  Instructions for follow-up, activity and diet:	Call your primary care doctor today or tomorrow to schedule follow up appointment for within the next 3-5 days.  Take cefuroxime 1 tablet twice daily for 7 days Finish the entire course of antibiotics as prescribed. If you have any belly pain after the antibiotics, yogurt has been shown to help with this. Do not use any alcohol or grapefruit juice with any antibiotics.  Drink plenty of fluids to stay well hydrated.  Take ibuprofen (or Motrin) 600mg (3 tablets) up to 4 times per day as needed for pain with food or milk.   Return to this Emergency Department if you experience worsening condition or for any other emergency. Respiratory

## 2023-08-01 NOTE — PATIENT INSTRUCTIONS
Take Aleve (naproxen), 1 tab twice daily, unless it bothers your stomach. Please schedule your next appointment in November. Please do your blood tests 2-5 days prior to your appointment with me.   You do not need to fast.     Please do not take vitamins or supplements for 3 days prior to the blood test.

## 2023-08-01 NOTE — ASSESSMENT & PLAN NOTE
Patient has an appointment with the orthopedist.  She previously had left knee replacement and was told she would need to have the right knee replaced also.

## 2023-08-07 RX ORDER — POTASSIUM CHLORIDE 750 MG/1
10 TABLET, EXTENDED RELEASE ORAL DAILY
Qty: 90 TABLET | Refills: 1 | Status: SHIPPED | OUTPATIENT
Start: 2023-08-07

## 2023-08-07 NOTE — TELEPHONE ENCOUNTER
Please review. Protocol failed/ No protocol      Requested Prescriptions   Pending Prescriptions Disp Refills    POTASSIUM CHLORIDE 10 MEQ Oral Tab CR [Pharmacy Med Name: POTASSIUM CHLORIDE ER 10 MEQ Tablet Extended Release] 90 tablet 1     Sig: TAKE 1 TABLET EVERY DAY       There is no refill protocol information for this order          Future Appointments         Provider Department Appt Notes    In 2 weeks Butch Deng MD [address] 1 yr f/u             Recent Outpatient Visits              6 days ago Acquired hypothyroidism    [address], Elmer hKan MD    Office Visit    4 months ago Osteoarthritis of right knee, unspecified osteoarthritis type    [address], Elmer Khan MD    Office Visit    5 months ago Encounter for [employer] annual wellness exam    Vidhi Boyer MD    Office Visit    8 months ago Atrial tachycardia [facility])    [address], Elmer Khan MD    Office Visit    9 months ago Gross hematuria    [address] [facility], [employer], AMIRAH Lara    Office Visit

## 2023-08-21 ENCOUNTER — OFFICE VISIT (OUTPATIENT)
Dept: ORTHOPEDICS CLINIC | Facility: CLINIC | Age: 78
End: 2023-08-21

## 2023-08-21 ENCOUNTER — HOSPITAL ENCOUNTER (OUTPATIENT)
Dept: GENERAL RADIOLOGY | Facility: HOSPITAL | Age: 78
Discharge: HOME | End: 2023-08-21
Attending: ORTHOPAEDIC SURGERY
Payer: MEDICARE

## 2023-08-21 DIAGNOSIS — M25.561 ACUTE PAIN OF RIGHT KNEE: ICD-10-CM

## 2023-08-21 DIAGNOSIS — M17.12 PRIMARY OSTEOARTHRITIS OF LEFT KNEE: ICD-10-CM

## 2023-08-21 DIAGNOSIS — Z96.652 S/P TKR (TOTAL KNEE REPLACEMENT), LEFT: ICD-10-CM

## 2023-08-21 DIAGNOSIS — M17.11 PRIMARY OSTEOARTHRITIS OF RIGHT KNEE: Primary | ICD-10-CM

## 2023-08-21 PROCEDURE — 73562 X-RAY EXAM OF KNEE 3: CPT | Performed by: ORTHOPAEDIC SURGERY

## 2023-08-21 NOTE — PROGRESS NOTES
NURSING INTAKE COMMENTS: Patient presents with:  Knee Pain: Right - onset about 2 mo ago - no injury - has swelling and pain in the knee rated as 6-8/10 on and off - had her  Left TKA done 6/23/22 and has no issues with it -         HPI: This 65year old female presents today to schedule right knee replacement. I replaced her left knee 1 year ago and she said \"I love how it feels\". Her right knee has decreased motion, swelling, and pain. She remains in very good shape physically. She lives independently by herself but her sister can stay with her for a few days after surgery as she did last time. She does not use cane crutch or walker. She denies anticoagulation. Past Medical History:   Diagnosis Date    Cataract     Cervical vertebral fusion 10/2/2014    Disorder of thyroid     Essential hypertension     High blood pressure     Hyperlipidemia     Hyperthyroidism     Incontinence     Positive FIT (fecal immunochemical test) 10/11/2022    05/10/22 EGD w/cold biopsy & Colonoscopy:  Normal EGD, internal hemorrhoids, tortuous colon    Screen for colon cancer 2022    no polyps noted on c-scope    Visual impairment     Readers     Past Surgical History:   Procedure Laterality Date    BACK SURGERY      CARPAL TUNNEL RELEASE Left     CATARACT Left 3/16/17    CATARACT Right 06/2017    COLONOSCOPY N/A 5/10/2022    Procedure: COLONOSCOPY;  Surgeon: Rozina Stallworth MD;  Location: [address] [place] [address] ENDOSCOPY    KNEE SURGERY Left 1982    OTHER SURGICAL HISTORY      Bladder nodules removed    OTHER SURGICAL HISTORY      Thyroid nodule removed     Current Outpatient Medications   Medication Sig Dispense Refill    potassium chloride 10 MEQ Oral Tab CR Take 1 tablet (10 mEq total) by mouth daily. 90 tablet 1    levothyroxine 137 MCG Oral Tab Take 137 mcg on Monday, Wed, Fridays. Take 125 mcg all other days.  50 tablet 1    levothyroxine 125 MCG Oral Tab Take 125 mcg in am on Sundays, Tuesdays, Thursdays, Saturdays Take the 137 mcg dose on all other days. 45 tablet 1    metoprolol succinate ER 25 MG Oral Tablet 24 Hr Take 2 tablets (50 mg total) by mouth daily. 90 tablet 1    hydroCHLOROthiazide 12.5 MG Oral Cap Take 1 capsule (12.5 mg total) by mouth daily. 90 capsule 1    ATORVASTATIN 40 MG Oral Tab TAKE 1 TABLET EVERY NIGHT 90 tablet 1    omeprazole 20 MG Oral Capsule Delayed Release Take 1 capsule (20 mg total) by mouth before breakfast. 90 capsule 1    cyanocobalamin 500 MCG Oral Tab Take 1 tablet (500 mcg total) by mouth daily. aspirin 81 MG Oral Tab EC Take 1 tablet (81 mg total) by mouth daily. docusate sodium 100 MG Oral Cap Take 100 mg by mouth 2 (two) times daily. Stop if loose stool. 20 capsule 0    multivitamin with minerals Oral Tab Take 1 tablet by mouth daily. 30 tablet 0    Calcium Carbonate 600 MG Oral Tab 1 tab twice a day with food. 60 tablet 0    Cholecalciferol (VITAMIN D3) 400 units Oral Tab Take by mouth. Vitamin C 500 MG Oral Tab Take 1 tablet (500 mg total) by mouth daily. Robaxin [Methocarba*    ITCHING  Family History   Problem Relation Age of Onset    Cancer Father     Heart Disorder Mother         Stroke    Cancer Brother     Breast Cancer Maternal Aunt 61    Breast Cancer Maternal Cousin Female 79    Cancer Niece 28        pancreatic ca    Hypertension Sister     Hypertension Sister     Other (Other) Sister         TIM    Diabetes Neg      No family Hx of DVT/PE    Social History    Occupational History      Not on file    Tobacco Use      Smoking status: Former        Packs/day: 0.50        Types: Cigarettes      Smokeless tobacco: Never    Vaping Use      Vaping Use: Never used    Substance and Sexual Activity      Alcohol use:  Yes        Alcohol/week: 1.0 standard drink of alcohol        Types: 1 Glasses of wine per week        Comment: ocassionally      Drug use: Not Currently        Types: Cannabis        Comment: Quit when she was 28yrs old      Sexual activity: Not Currently       Review of Systems:  GENERAL: feels generally well, no significant weight loss or weight gain  SKIN: no ulcerated or worrisome skin lesions  EYES:denies blurred vision or double vision  HEENT: denies new nasal congestion, sinus pain or ST  LUNGS: denies shortness of breath  CARDIOVASCULAR: denies chest pain  GI: no hematemesis, no worsening heartburn, no diarrhea  : no dysuria, no blood in urine, no difficulty urinating, no incontinence  MUSCULOSKELETAL: no other musculoskeletal complaints other than in HPI  NEURO: no numbness or tingling, no weakness or balance disorder  PSYCHE: no depression or anxiety  HEMATOLOGIC: no hx of blood dyscrasia, no Hx DVT/PE  ENDOCRINE: no thyroid or diabetes issues  ALL/ASTHMA: no new hx of severe allergy or asthma    Physical Examination:    There were no vitals taken for this visit. Constitutional: appears well hydrated, alert and responsive, no acute distress noted  Extremities: The skin on both legs was healthy and she did not have pitting edema today. There is no calf tenderness bilaterally. Neither knee had effusion or asymmetric warmth. The left knee incision is healed very cosmetically. The right knee has a slight varus alignment compared to the left knee which appears normal.  Musculoskeletal: The left knee replacement ranges 0 to 135 degrees. The right knee that needs to be replaced ranges of 5-125 degrees. Neither knee had instability or patellar tracking issues. She does have some pain to the patellofemoral joint of the right knee with some crepitus. Neurological: Normal motor and sensory bilateral lower extremities. Imaging: X-rays of both knees show the left knee replacement well fixed and in proper alignment. The right knee has bone-on-bone in the medial and patellofemoral joints with arthritis. XR LUMBAR SPINE (MIN 4 VIEWS) (CPT=72110)    Result Date: 8/1/2023  PROCEDURE: XR LUMBAR SPINE (MIN 4 VIEWS) (CPT=72110)  COMPARISON: None.   INDICATIONS: Acute left-sided low back pain without sciatica x few days. No trauma. TECHNIQUE: Lumbar spine radiographs (minimum 4 views)   FINDINGS:   ALIGNMENT:   Straightening of the normal lumbar lordosis. VERTEBRAL BODIES:   Multilevel spondylosis. No acute fracture or malalignment. Endplate marginal osteophyte formation throughout. OBLIQUE VIEWS: Bilateral facet arthrosis mid to lower lumbar spine OTHER: Negative. CONCLUSION:  1. No acute fracture or malalignment. 2. Straightening of the normal lumbar lordosis. Multilevel spondylosis. Dictated by (CST): Leslie Galo MD on 8/01/2023 at 5:28 PM     Finalized by (CST): Leslie Galo MD on 8/01/2023 at 5:29 PM             Lab Results   Component Value Date    WBC 6.3 03/29/2023    HGB 11.9 (L) 03/29/2023    .0 03/29/2023      Lab Results   Component Value Date    GLU 98 03/29/2023    BUN 20 (H) 03/29/2023    CREATSERUM 0.89 03/29/2023    GFRNAA 84 06/24/2022    GFRAA 97 06/24/2022        Assessment and Plan:  Diagnoses and all orders for this visit:    Primary osteoarthritis of right knee  -     MRI KNEE MARCEL, RIGHT (CPT=73721); Future    Acute pain of right knee  -     XR KNEE (3 VIEWS) AP LAT OBL BILAT EM (CPT=73562-50); Future  -     MRI KNEE MARCEL, RIGHT (CPT=73721); Future    Primary osteoarthritis of left knee    S/P TKR (total knee replacement), left        Assessment: 1 year after left knee replacement doing very well. Right knee osteoarthritis and desires right knee replacement. Plan: I told her that we would follow the same plan as we executed last year. She will get MRI templating, medical and dental clearance, and her sister will stay with her for a few days when she goes home the day after surgery.     We discussed potential risks of surgery such as death, heart attack, stroke, bleeding, infection, blood clot, nerve injury, artery injury, failure fixation, fracture, stiffness, loosening, pain despite surgery, potential need for further surgery, numbness lateral to the incision, and instability. She has questions which were answered to her satisfaction. She wishes to proceed. [facility]ley protocol MRI was ordered. Follow Up: No follow-ups on file.     Brandon Alegre MD

## 2023-08-26 DIAGNOSIS — E03.9 ACQUIRED HYPOTHYROIDISM: ICD-10-CM

## 2023-08-27 RX ORDER — LEVOTHYROXINE SODIUM 0.12 MG/1
TABLET ORAL
Qty: 39 TABLET | Refills: 0 | Status: SHIPPED | OUTPATIENT
Start: 2023-08-27

## 2023-08-27 NOTE — TELEPHONE ENCOUNTER
Please review. Protocol failed / No Protocol. Requested Prescriptions   Pending Prescriptions Disp Refills    LEVOTHYROXINE 125 MCG Oral Tab [Pharmacy Med Name: LEVOTHYROXINE SODIUM 125 MCG Tablet] 39 tablet 0     Sig: TAKE 1 TABLET IN THE MORNING ON MONDAYS, [address], AND FRIDAYS. TAKE  MCG DOSE ON ALL OTHER DAYS.        Thyroid Medication Protocol Failed - 8/26/2023  8:44 AM        Failed - Last TSH value is normal     Lab Results   Component Value Date    TSH 0.208 (L) 06/05/2023                 Passed - TSH in past 12 months        Passed - In person appointment or virtual visit in the past 12 mos or appointment in next 3 mos     Recent Outpatient Visits              6 days ago Primary osteoarthritis of right knee    [address], Trevor Palomino MD    Office Visit    3 weeks ago Acquired hypothyroidism    [address], Yury Shipley MD    Office Visit    4 months ago Osteoarthritis of right knee, unspecified osteoarthritis type    [address], Yury Shipley MD    Office Visit    6 months ago Encounter for [employer] annual wellness exam    [address], Yury Shipley MD    Office Visit    9 months ago Atrial tachycardia [facility])    [address], Yury Shipley MD    Office Visit

## 2023-09-01 ENCOUNTER — TELEPHONE (OUTPATIENT)
Dept: ORTHOPEDICS CLINIC | Facility: CLINIC | Age: 78
End: 2023-09-01

## 2023-09-13 DIAGNOSIS — I10 ESSENTIAL HYPERTENSION: ICD-10-CM

## 2023-09-14 RX ORDER — METOPROLOL SUCCINATE 25 MG/1
50 TABLET, EXTENDED RELEASE ORAL DAILY
Qty: 180 TABLET | Refills: 3 | Status: SHIPPED | OUTPATIENT
Start: 2023-09-14

## 2023-10-24 ENCOUNTER — LAB ENCOUNTER (OUTPATIENT)
Dept: LAB | Facility: HOSPITAL | Age: 78
End: 2023-10-24
Attending: INTERNAL MEDICINE

## 2023-10-24 DIAGNOSIS — E03.9 ACQUIRED HYPOTHYROIDISM: ICD-10-CM

## 2023-10-24 LAB — TSI SER-ACNC: 3.79 MIU/ML (ref 0.36–3.74)

## 2023-10-24 PROCEDURE — 84439 ASSAY OF FREE THYROXINE: CPT

## 2023-10-24 PROCEDURE — 36415 COLL VENOUS BLD VENIPUNCTURE: CPT

## 2023-10-24 PROCEDURE — 84443 ASSAY THYROID STIM HORMONE: CPT

## 2023-10-25 LAB — T4 FREE SERPL-MCNC: 1.1 NG/DL (ref 0.8–1.7)

## 2023-10-31 ENCOUNTER — OFFICE VISIT (OUTPATIENT)
Facility: CLINIC | Age: 78
End: 2023-10-31

## 2023-10-31 VITALS
DIASTOLIC BLOOD PRESSURE: 67 MMHG | SYSTOLIC BLOOD PRESSURE: 120 MMHG | BODY MASS INDEX: 23.57 KG/M2 | WEIGHT: 133 LBS | HEIGHT: 63 IN

## 2023-10-31 DIAGNOSIS — K21.9 GASTROESOPHAGEAL REFLUX DISEASE, UNSPECIFIED WHETHER ESOPHAGITIS PRESENT: ICD-10-CM

## 2023-10-31 DIAGNOSIS — E03.9 ACQUIRED HYPOTHYROIDISM: Primary | ICD-10-CM

## 2023-10-31 DIAGNOSIS — Z12.31 BREAST CANCER SCREENING BY MAMMOGRAM: ICD-10-CM

## 2023-10-31 DIAGNOSIS — I10 ESSENTIAL HYPERTENSION: ICD-10-CM

## 2023-10-31 DIAGNOSIS — M81.0 SENILE OSTEOPOROSIS: ICD-10-CM

## 2023-10-31 PROCEDURE — 90662 IIV NO PRSV INCREASED AG IM: CPT | Performed by: INTERNAL MEDICINE

## 2023-10-31 PROCEDURE — 1159F MED LIST DOCD IN RCRD: CPT | Performed by: INTERNAL MEDICINE

## 2023-10-31 PROCEDURE — 1126F AMNT PAIN NOTED NONE PRSNT: CPT | Performed by: INTERNAL MEDICINE

## 2023-10-31 PROCEDURE — 3074F SYST BP LT 130 MM HG: CPT | Performed by: INTERNAL MEDICINE

## 2023-10-31 PROCEDURE — G0008 ADMIN INFLUENZA VIRUS VAC: HCPCS | Performed by: INTERNAL MEDICINE

## 2023-10-31 PROCEDURE — 3008F BODY MASS INDEX DOCD: CPT | Performed by: INTERNAL MEDICINE

## 2023-10-31 PROCEDURE — 3078F DIAST BP <80 MM HG: CPT | Performed by: INTERNAL MEDICINE

## 2023-10-31 PROCEDURE — 99214 OFFICE O/P EST MOD 30 MIN: CPT | Performed by: INTERNAL MEDICINE

## 2023-10-31 PROCEDURE — 1160F RVW MEDS BY RX/DR IN RCRD: CPT | Performed by: INTERNAL MEDICINE

## 2023-10-31 RX ORDER — DILTIAZEM HYDROCHLORIDE 120 MG/1
120 TABLET, FILM COATED ORAL 4 TIMES DAILY
COMMUNITY

## 2023-10-31 NOTE — ASSESSMENT & PLAN NOTE
Letter mailed to pt with results.    Dr. Crowe recommends repeat colonoscopy in 5 year(s).  Checklist:  Surgical history updated.  Colonoscopy tracking completed.  Episode resolved.   Reminder entered.          She saw endocrinology a few years ago, but lost her phone number and forgot to schedule follow-up. Advised her to schedule another appointment.

## 2023-10-31 NOTE — ASSESSMENT & PLAN NOTE
I reviewed patient's medications that she brought with her. She has been taking both pantoprazole from an old prescription from 2022 and omeprazole. Advised patient to stop the pantoprazole and continue omeprazole.

## 2023-10-31 NOTE — PATIENT INSTRUCTIONS
Stop metoprolol and pantoprazole. My last day will  be January 11, 2024.   I recommend the following providers:    Kalpana Cain [facility] (Internal Medicine) [phone]  Dr. Elvia Corley (Internal Medicine):  [phone] [address] [address]

## 2023-10-31 NOTE — ASSESSMENT & PLAN NOTE
Reviewed all the patient's bottles that she has with her. She was advised to stop metoprolol, however she has been taking it because the pharmacy has been refilling it. We discarded that today. Continue diltiazem per cardiologist.  Continue hydrochlorothiazide.

## 2024-02-08 ENCOUNTER — OFFICE VISIT (OUTPATIENT)
Dept: INTERNAL MEDICINE CLINIC | Facility: CLINIC | Age: 79
End: 2024-02-08

## 2024-02-08 VITALS
HEIGHT: 63 IN | HEART RATE: 76 BPM | DIASTOLIC BLOOD PRESSURE: 64 MMHG | BODY MASS INDEX: 23.1 KG/M2 | SYSTOLIC BLOOD PRESSURE: 102 MMHG | OXYGEN SATURATION: 98 % | WEIGHT: 130.38 LBS

## 2024-02-08 DIAGNOSIS — K21.9 GASTROESOPHAGEAL REFLUX DISEASE, UNSPECIFIED WHETHER ESOPHAGITIS PRESENT: ICD-10-CM

## 2024-02-08 DIAGNOSIS — E03.9 ACQUIRED HYPOTHYROIDISM: Primary | ICD-10-CM

## 2024-02-08 DIAGNOSIS — I10 ESSENTIAL HYPERTENSION: ICD-10-CM

## 2024-02-08 PROCEDURE — 1126F AMNT PAIN NOTED NONE PRSNT: CPT | Performed by: INTERNAL MEDICINE

## 2024-02-08 PROCEDURE — 1159F MED LIST DOCD IN RCRD: CPT | Performed by: INTERNAL MEDICINE

## 2024-02-08 PROCEDURE — 3074F SYST BP LT 130 MM HG: CPT | Performed by: INTERNAL MEDICINE

## 2024-02-08 PROCEDURE — 3078F DIAST BP <80 MM HG: CPT | Performed by: INTERNAL MEDICINE

## 2024-02-08 PROCEDURE — 99213 OFFICE O/P EST LOW 20 MIN: CPT | Performed by: INTERNAL MEDICINE

## 2024-02-08 PROCEDURE — 1160F RVW MEDS BY RX/DR IN RCRD: CPT | Performed by: INTERNAL MEDICINE

## 2024-02-08 PROCEDURE — 3008F BODY MASS INDEX DOCD: CPT | Performed by: INTERNAL MEDICINE

## 2024-02-08 NOTE — PROGRESS NOTES
Penny Roblero is a 78 year old female who is here for  No diagnosis found.      HPI:   Penny Roblero is a 78 year old female presents to follow up on hypertension. Feeling okay, sister had a stroke and was out of state for a few months.  Hypothyroid takes  mcg, and 137 mcg on the other days.   She has been having increased urinary frequency for months, on hydrochlorothiazide, /64. No burning, urgency, fever, abdominal discomfort or dysuria.     Past Medical History:   Diagnosis Date    Cataract     Cervical vertebral fusion 10/2/2014    Disorder of thyroid     Essential hypertension     High blood pressure     Hyperlipidemia     Hyperthyroidism     Incontinence     Positive FIT (fecal immunochemical test) 10/11/2022    05/10/22 EGD w/cold biopsy & Colonoscopy:  Normal EGD, internal hemorrhoids, tortuous colon    Screen for colon cancer 2022    no polyps noted on c-scope    Visual impairment     Readers     Past Surgical History:   Procedure Laterality Date    BACK SURGERY      CARPAL TUNNEL RELEASE Left     CATARACT Left 3/16/17    CATARACT Right 06/2017    COLONOSCOPY N/A 5/10/2022    Procedure: COLONOSCOPY;  Surgeon: CRISS Fonseca MD;  Location: [facility] ENDOSCOPY    KNEE SURGERY Left 1982    OTHER SURGICAL HISTORY      Bladder nodules removed    OTHER SURGICAL HISTORY      Thyroid nodule removed       Current Outpatient Medications:     dilTIAZem HCl 120 MG Oral Tab, Take 1 tablet (120 mg total) by mouth 4 (four) times daily., Disp: , Rfl:     levothyroxine 125 MCG Oral Tab, TAKE 1 TABLET IN THE MORNING ON MONDAYS, WEDNESDAYS, AND FRIDAYS. TAKE  MCG DOSE ON ALL OTHER DAYS., Disp: 39 tablet, Rfl: 0    potassium chloride 10 MEQ Oral Tab CR, Take 1 tablet (10 mEq total) by mouth daily., Disp: 90 tablet, Rfl: 1    levothyroxine 137 MCG Oral Tab, Take 137 mcg on Monday, Wed, Fridays.  Take 125 mcg all other days., Disp: 50 tablet, Rfl: 1    hydroCHLOROthiazide 12.5 MG Oral Cap, Take 1 capsule (12.5  mg total) by mouth daily., Disp: 90 capsule, Rfl: 1    ATORVASTATIN 40 MG Oral Tab, TAKE 1 TABLET EVERY NIGHT, Disp: 90 tablet, Rfl: 1    omeprazole 20 MG Oral Capsule Delayed Release, Take 1 capsule (20 mg total) by mouth before breakfast., Disp: 90 capsule, Rfl: 1    cyanocobalamin 500 MCG Oral Tab, Take 1 tablet (500 mcg total) by mouth daily., Disp: , Rfl:     aspirin 81 MG Oral Tab EC, Take 1 tablet (81 mg total) by mouth daily., Disp: , Rfl:     docusate sodium 100 MG Oral Cap, Take 100 mg by mouth 2 (two) times daily. Stop if loose stool., Disp: 20 capsule, Rfl: 0    multivitamin with minerals Oral Tab, Take 1 tablet by mouth daily., Disp: 30 tablet, Rfl: 0    Calcium Carbonate 600 MG Oral Tab, 1 tab twice a day with food., Disp: 60 tablet, Rfl: 0    Cholecalciferol (VITAMIN D3) 400 units Oral Tab, Take by mouth., Disp: , Rfl:     Vitamin C 500 MG Oral Tab, Take 1 tablet (500 mg total) by mouth daily., Disp: , Rfl:     Allergies:  Allergies   Allergen Reactions    Robaxin [Methocarbamol] ITCHING     Social History     Socioeconomic History    Marital status:      Spouse name: Not on file    Number of children: Not on file    Years of education: Not on file    Highest education level: Not on file   Occupational History    Not on file   Tobacco Use    Smoking status: Former     Packs/day: .5     Types: Cigarettes    Smokeless tobacco: Never   Vaping Use    Vaping Use: Never used   Substance and Sexual Activity    Alcohol use: Yes     Alcohol/week: 1.0 standard drink of alcohol     Types: 1 Glasses of wine per week     Comment: ocassionally    Drug use: Not Currently     Types: Cannabis     Comment: Quit when she was 29yrs old    Sexual activity: Not Currently   Other Topics Concern    Grew up on a farm Not Asked    History of tanning Not Asked    Outdoor occupation Not Asked    Breast feeding Not Asked    Reaction to local anesthetic No   Social History Narrative    Not on file     Social Determinants of  Health     Financial Resource Strain: Not on file   Food Insecurity: Not on file   Transportation Needs: Not on file   Physical Activity: Not on file   Stress: Not on file   Social Connections: Not on file   Housing Stability: Not on file       REVIEW OF SYSTEMS:     GENERAL HEALTH: No fevers, chills, sweats, fatigue  VISION: No recent vision problems, blurry vision or double vision  HEENT: No decreased hearing ear pain nasal congestion or sore throat  SKIN: denies any unusual skin lesions or rashes  RESPIRATORY: denies shortness of breath, cough, wheezing  CARDIOVASCULAR: denies chest pain on exertion, palpitations, swelling in feet  GI: denies abdominal pain and denies heartburn, nausea or vomiting  : No Pain on urination, change in the color of urine, discharge, +urinating frequently  MUS: No back pain, joint pain, muscle pain  NEURO: denies headaches , anxiety, depression    EXAM:     Vitals:    02/08/24 1339   BP: 102/64   Pulse: 76   SpO2: 98%   Weight: 130 lb 6.4 oz (59.1 kg)   Height: 5' 3\" (1.6 m)     GENERAL: well developed, well nourished,in no apparent distress  SKIN: no rashes,no suspicious lesions  HEENT: atraumatic, normocephalic,ears and throat are clear,   NECK: supple,no adenopathy,  LUNGS: clear to auscultation, no wheeze  CARDIO: RRR without murmur  GI: good BS's,no masses or tenderness  EXTREMITIES: no cyanosis, or edema    ASSESSMENT AND PLAN:   1. Acquired hypothyroidism  -TSH 3.7 10/2023  -takes levothyroxine 125 mcg MWF, and 137 mcg on the other days.     2. Essential hypertension  -been experiencing increased urinartion frequency  -/64  Plan:  -stop hydrochlorothiazide  -continue diltiazem   -return to office in 2 weeks    3. Gastroesophageal reflux disease, unspecified whether esophagitis present  -stable on omeprazole        The patient indicates understanding of these issues and agrees to the plan.    No follow-ups on file.        Lizy Burroughs MD  2/8/2024

## 2024-02-12 ENCOUNTER — NURSE TRIAGE (OUTPATIENT)
Dept: INTERNAL MEDICINE CLINIC | Facility: CLINIC | Age: 79
End: 2024-02-12

## 2024-02-12 NOTE — TELEPHONE ENCOUNTER
Pt called and she stated that was told she has to stop a medication but pt was not sure of the name of the medication. Per office notes on 2/8/2024 pt is to stop hydrochlorothiazide. Pt was inform of this information and she verbalized understanding.

## 2024-02-15 ENCOUNTER — HOSPITAL ENCOUNTER (OUTPATIENT)
Dept: MAMMOGRAPHY | Facility: HOSPITAL | Age: 79
Discharge: HOME | End: 2024-02-15
Attending: INTERNAL MEDICINE
Payer: MEDICARE

## 2024-02-15 DIAGNOSIS — Z12.31 BREAST CANCER SCREENING BY MAMMOGRAM: ICD-10-CM

## 2024-02-15 PROCEDURE — 77067 SCR MAMMO BI INCL CAD: CPT | Performed by: INTERNAL MEDICINE

## 2024-02-15 PROCEDURE — 77063 BREAST TOMOSYNTHESIS BI: CPT | Performed by: INTERNAL MEDICINE

## 2024-02-20 ENCOUNTER — OFFICE VISIT (OUTPATIENT)
Dept: INTERNAL MEDICINE CLINIC | Facility: CLINIC | Age: 79
End: 2024-02-20

## 2024-02-20 VITALS
BODY MASS INDEX: 22.2 KG/M2 | HEART RATE: 73 BPM | WEIGHT: 130 LBS | SYSTOLIC BLOOD PRESSURE: 121 MMHG | HEIGHT: 64 IN | RESPIRATION RATE: 16 BRPM | DIASTOLIC BLOOD PRESSURE: 71 MMHG

## 2024-02-20 DIAGNOSIS — L84 CORN OF TOE: ICD-10-CM

## 2024-02-20 DIAGNOSIS — I10 ESSENTIAL HYPERTENSION: Primary | ICD-10-CM

## 2024-02-20 DIAGNOSIS — M79.674 PAIN OF TOE OF RIGHT FOOT: ICD-10-CM

## 2024-02-20 PROCEDURE — 3008F BODY MASS INDEX DOCD: CPT | Performed by: NURSE PRACTITIONER

## 2024-02-20 PROCEDURE — 1159F MED LIST DOCD IN RCRD: CPT | Performed by: NURSE PRACTITIONER

## 2024-02-20 PROCEDURE — 1160F RVW MEDS BY RX/DR IN RCRD: CPT | Performed by: NURSE PRACTITIONER

## 2024-02-20 PROCEDURE — 99213 OFFICE O/P EST LOW 20 MIN: CPT | Performed by: NURSE PRACTITIONER

## 2024-02-20 PROCEDURE — 1170F FXNL STATUS ASSESSED: CPT | Performed by: NURSE PRACTITIONER

## 2024-02-20 PROCEDURE — 3078F DIAST BP <80 MM HG: CPT | Performed by: NURSE PRACTITIONER

## 2024-02-20 PROCEDURE — 3074F SYST BP LT 130 MM HG: CPT | Performed by: NURSE PRACTITIONER

## 2024-02-20 RX ORDER — HYDROCHLOROTHIAZIDE 12.5 MG/1
CAPSULE, GELATIN COATED ORAL
COMMUNITY
Start: 2024-02-20

## 2024-02-20 NOTE — PATIENT INSTRUCTIONS
Ok to take hydrochlorothiazide 2 times a week as needed for swelling.     You do not need to take the medication daily. Your blood pressure is normal in office.

## 2024-02-20 NOTE — PROGRESS NOTES
Penny Roblero is a 78 year old female.  Chief Complaint   Patient presents with    Pain     Right side     HPI:   Patient presents for follow up. She was seen by Dr Burroughs on 2/8 and was told to discontinue hydrochlorothiazide due to having urinary frequency. She stopped the medication and her symptoms of urinary frequency has improved but she has noticed swelling in her legs and feet.     She also has a corn on the right 5th toe. She is requesting a referral for a podiatrist.       Current Outpatient Medications   Medication Sig Dispense Refill    hydroCHLOROthiazide 12.5 MG Oral Cap Take 2 times a week as needed for leg swelling.      dilTIAZem HCl 120 MG Oral Tab Take 1 tablet (120 mg total) by mouth 4 (four) times daily.      levothyroxine 125 MCG Oral Tab TAKE 1 TABLET IN THE MORNING ON MONDAYS, WEDNESDAYS, AND FRIDAYS. TAKE  MCG DOSE ON ALL OTHER DAYS. 39 tablet 0    potassium chloride 10 MEQ Oral Tab CR Take 1 tablet (10 mEq total) by mouth daily. 90 tablet 1    levothyroxine 137 MCG Oral Tab Take 137 mcg on Monday, Wed, Fridays.  Take 125 mcg all other days. 50 tablet 1    ATORVASTATIN 40 MG Oral Tab TAKE 1 TABLET EVERY NIGHT 90 tablet 1    omeprazole 20 MG Oral Capsule Delayed Release Take 1 capsule (20 mg total) by mouth before breakfast. 90 capsule 1    cyanocobalamin 500 MCG Oral Tab Take 1 tablet (500 mcg total) by mouth daily.      aspirin 81 MG Oral Tab EC Take 1 tablet (81 mg total) by mouth daily.      docusate sodium 100 MG Oral Cap Take 100 mg by mouth 2 (two) times daily. Stop if loose stool. 20 capsule 0    multivitamin with minerals Oral Tab Take 1 tablet by mouth daily. 30 tablet 0    Calcium Carbonate 600 MG Oral Tab 1 tab twice a day with food. 60 tablet 0    Cholecalciferol (VITAMIN D3) 400 units Oral Tab Take by mouth.      Vitamin C 500 MG Oral Tab Take 1 tablet (500 mg total) by mouth daily.        Past Medical History:   Diagnosis Date    Cataract     Cervical vertebral fusion  10/2/2014    Disorder of thyroid     Essential hypertension     High blood pressure     Hyperlipidemia     Hyperthyroidism     Incontinence     Positive FIT (fecal immunochemical test) 10/11/2022    05/10/22 EGD w/cold biopsy & Colonoscopy:  Normal EGD, internal hemorrhoids, tortuous colon    Screen for colon cancer 2022    no polyps noted on c-scope    Visual impairment     Readers      Past Surgical History:   Procedure Laterality Date    BACK SURGERY      CARPAL TUNNEL RELEASE Left     CATARACT Left 3/16/17    CATARACT Right 06/2017    COLONOSCOPY N/A 5/10/2022    Procedure: COLONOSCOPY;  Surgeon: CRISS Fonseca MD;  Location: [facility] ENDOSCOPY    KNEE SURGERY Left 1982    OTHER SURGICAL HISTORY      Bladder nodules removed    OTHER SURGICAL HISTORY      Thyroid nodule removed      Social History:  Social History     Socioeconomic History    Marital status:    Tobacco Use    Smoking status: Former     Packs/day: .5     Types: Cigarettes    Smokeless tobacco: Never   Vaping Use    Vaping Use: Never used   Substance and Sexual Activity    Alcohol use: Yes     Alcohol/week: 1.0 standard drink of alcohol     Types: 1 Glasses of wine per week     Comment: ocassionally    Drug use: Not Currently     Types: Cannabis     Comment: Quit when she was 29yrs old    Sexual activity: Not Currently   Other Topics Concern    Reaction to local anesthetic No      Family History   Problem Relation Age of Onset    Cancer Father     Heart Disorder Mother         Stroke    Cancer Brother     Breast Cancer Maternal Aunt 60    Breast Cancer Maternal Cousin Female 70    Cancer Niece 32        pancreatic ca    Hypertension Sister     Hypertension Sister     Other (Other) Sister         TIM    Diabetes Neg       Allergies   Allergen Reactions    Robaxin [Methocarbamol] ITCHING        REVIEW OF SYSTEMS:     Review of Systems   Constitutional:  Negative for fever.   HENT: Negative.     Respiratory:  Negative for cough, shortness of  breath and wheezing.    Cardiovascular:  Positive for leg swelling. Negative for chest pain.   Gastrointestinal:  Negative for abdominal pain.   Genitourinary: Negative.  Negative for frequency.   Musculoskeletal: Negative.    Skin: Negative.    Neurological: Negative.    Psychiatric/Behavioral: Negative.        Wt Readings from Last 5 Encounters:   02/20/24 130 lb (59 kg)   02/08/24 130 lb 6.4 oz (59.1 kg)   10/31/23 133 lb (60.3 kg)   08/01/23 130 lb (59 kg)   02/21/23 131 lb (59.4 kg)     Body mass index is 22.31 kg/m².      EXAM:   /71   Pulse 73   Resp 16   Ht 5' 4\" (1.626 m)   Wt 130 lb (59 kg)   BMI 22.31 kg/m²     Physical Exam  Vitals reviewed.   Constitutional:       Appearance: Normal appearance.   HENT:      Head: Normocephalic.   Cardiovascular:      Rate and Rhythm: Normal rate and regular rhythm.      Pulses: Normal pulses.   Pulmonary:      Breath sounds: Normal breath sounds. No wheezing.   Musculoskeletal:         General: No swelling. Normal range of motion.      Right lower leg: Edema present.      Left lower leg: Edema present.        Feet:    Feet:      Comments: Corn present on 5th toe of right foot  Skin:     General: Skin is warm and dry.   Neurological:      Mental Status: She is alert and oriented to person, place, and time.   Psychiatric:         Mood and Affect: Mood normal.         Behavior: Behavior normal.            ASSESSMENT AND PLAN:   1. Essential hypertension  - BP stable in office  - dw patient to take hydrochlorothiazide as needed for leg swelling  - monitor salt intake and elevate BLE as tolerated  - discussed compression stocking to reduce leg swelling but they are too hard for the patient to put on    - hydroCHLOROthiazide 12.5 MG Oral Cap; Take 2 times a week as needed for leg swelling.    2. Pain of toe of right foot  - Podiatry Referral - [facility])    3. Corn of toe  - Podiatry Referral - [facility])      The  patient indicates understanding of these issues and agrees to the plan.

## 2024-02-21 ENCOUNTER — OFFICE VISIT (OUTPATIENT)
Dept: ENDOCRINOLOGY CLINIC | Facility: CLINIC | Age: 79
End: 2024-02-21

## 2024-02-21 VITALS
WEIGHT: 130 LBS | BODY MASS INDEX: 21.66 KG/M2 | HEIGHT: 65 IN | DIASTOLIC BLOOD PRESSURE: 57 MMHG | HEART RATE: 81 BPM | SYSTOLIC BLOOD PRESSURE: 121 MMHG

## 2024-02-21 DIAGNOSIS — E55.9 VITAMIN D DEFICIENCY: ICD-10-CM

## 2024-02-21 DIAGNOSIS — M81.0 AGE-RELATED OSTEOPOROSIS WITHOUT CURRENT PATHOLOGICAL FRACTURE: Primary | ICD-10-CM

## 2024-02-21 PROCEDURE — 3008F BODY MASS INDEX DOCD: CPT | Performed by: INTERNAL MEDICINE

## 2024-02-21 PROCEDURE — 99214 OFFICE O/P EST MOD 30 MIN: CPT | Performed by: INTERNAL MEDICINE

## 2024-02-21 PROCEDURE — 3078F DIAST BP <80 MM HG: CPT | Performed by: INTERNAL MEDICINE

## 2024-02-21 PROCEDURE — 3074F SYST BP LT 130 MM HG: CPT | Performed by: INTERNAL MEDICINE

## 2024-02-21 NOTE — PROGRESS NOTES
Follow-up - Reason for Visit:  Osteoporosis.  Requesting Physician: Dr. Caitlyn Thomas.    CHIEF COMPLAINT:    Chief Complaint   Patient presents with    Osteoporosis     Follow up  Pt denies any recent falls/fractures/ or dental work          HISTORY OF PRESENT ILLNESS:   Penny Roblero is a 78 year old female who presents with Osteoporosis for follow-up. At the last visit, we had decided that Reclast would be the best management for the patient's osteoporosis. She had one infusion of Reclast and then she has had a DEXA scan. She has been taking calcium and vitamin D, and has been exercising. She is taking care of her sister right now, who just had a stroke.     She had this on 3/22/21.  She has had no falls and fractures  She takes vitamin D and calcium  She is complaining of some elbow pain.    FRACTURE HISTORY  o Low trauma, atraumatic or high trauma (specifics regarding circumstances of fracture)  Yes- fractured ribs on the right side  o Fall-related fracture and circumstances of fall  Fell in the bathtub twice  o Prior history of fracture(s)   See above  o Site, age at fracture, interventions  Right three ribs  o Prior history of osteoporosis  None, just discovered in November 2020  o Prior history of osteoporosis treatment (medication, age or date used, duration of use, pre or postmenopausal ,when used, and reason for discontinuation)   Started Alendronate on 2/01/21  o Prolonged history of steroids, aromatase inhibitors, anticonvulsants, and other meds that may affect skeleton  None  o Chronic use vs. intermittent use with dates of usage  N/A  o Secondary conditions associated with osteoporosis  N/A  o DXA tests in past ( age or date when performed and results)   PROCEDURE:  XR DEXA BONE DENSITOMETRY (CPT=77080)     COMPARISON: [facility], XR DEXA BONE DENSITOMETRY (CPT=77080), 9/18/2018, 11:47 AM.     INDICATIONS:  M81.0 Age-related osteoporosis without current pathological fracture      TECHNIQUE:    Measurement of bone mineral density of the lumbar spine and hip was performed on a Hologic dual energy x-ray absorptiometry scanner.     FINDINGS:             LEFT FEMORAL NECK               BMD:    0.550 gm/sq. cm.            T SCORE:         -2.7       Z SCORE:         -1.2     LEFT TOTAL HIP               BMD:    0.632 gm/sq. cm.            T SCORE:         -2.5       Z SCORE:         -1.2     PA LUMBAR SPINE (L1 - L4)               BMD:    0.901 gm/sq. cm.            T SCORE:         -1.3       Z SCORE:         0.4                     T scores are a comparison to sex-matched patients with mean peak bone mass and are given in standard deviation (s.d.).  Each 1 s.d. corresponds to approximately 10% below peak normal bone density.       WOR[employer] ORGANIZATION CRITERIA  NORMAL T SCORE:      Above -1 s.d.    OSTEOPENIA T SCORE:           Between -1 and -2.5 s.d.    OSTEOPOROSIS T SCORE:     -2.5 s.d.     National Osteoporosis Foundation Clinician's Guide to Prevention and Treatment of Osteoporosis recommendations for treatment:  Post menopausal women and men age 50 and older presenting with the following should be considered for treatment:  * A hip or vertebral (clinical or morphometric) fracture  * T score < -2.5 at the femoral neck or spine after appropriate evaluation to exclude secondary causes.  * Low bone mass (T score between -1.0 and -2.5 at the femoral neck or spine) and a 10-year probability of a hip fracture > 3% or a 10-year probability of a major osteoporosis-related fracture > 20% based on the [place]-adapted WHO algorithm    =====  CONCLUSION:   1.          Osteoporosis with high fracture risk.   2.          When compared to baseline the left hip has gone from 0.733 to 0.632 a  loss of  13.8 %  and the AP lumbar has gone from 0.888 to 0.901 a rise of 1.5 %.     10 year Fracture Risk:      Major Osteoporotic Fracture:  7.9 %.   Hip Fracture:  2.7 %.    REPRODUCTIVE HISTORY  o Menarche age    [age]  o Regular/Irregular menses   irregular  o History of amenorrhea  yes  o [facility]    o Breastfeeding  Started breasfeeding  o Contraceptives? Infertility?  Used IUD after second son, and then had it taken out, and then had another child after 13 years  o Natural menopause age   Age 49  o Hormone therapy (start and stop)  None  o Concurrent diseases and medications    PAST MEDICAL HISTORY:   Past Medical History:   Diagnosis Date    Cataract     Cervical vertebral fusion 10/2/2014    Disorder of thyroid     Essential hypertension     High blood pressure     Hyperlipidemia     Hyperthyroidism     Incontinence     Positive FIT (fecal immunochemical test) 10/11/2022    05/10/22 EGD w/cold biopsy & Colonoscopy:  Normal EGD, internal hemorrhoids, tortuous colon    Screen for colon cancer     no polyps noted on c-scope    Visual impairment     Readers       SURGICAL HISTORY  Past Surgical History:   Procedure Laterality Date    BACK SURGERY      CARPAL TUNNEL RELEASE Left     CATARACT Left 3/16/17    CATARACT Right 2017    COLONOSCOPY N/A 5/10/2022    Procedure: COLONOSCOPY;  Surgeon: CRISS Fonseca MD;  Location: [facility] ENDOSCOPY    KNEE SURGERY Left     OTHER SURGICAL HISTORY      Bladder nodules removed    OTHER SURGICAL HISTORY      Thyroid nodule removed   Thyroidectomy  Left Knee Surgery    o Hysterectomy  None  o Oopherectomy  None  o Gastric bypass   None  o Other including h/o transplant:  None    SOCIAL HISTORY    o Occupation    and a [employer] , as a polisher     [place]   [place]  o Tobacco  Smoked for only a few years  o Alcohol  Occasionally, only wine, very rarely  o Daily calcium intake (food and supplements)  Takes calcium supplements- CaCO3 600mg PO bid, MV, Vitamin D 400 units  o Daily exercise  Has a treadmill and a stationary bike    FAMILY HISTORY   Family History   Problem Relation Age of Onset    Cancer Father     Heart  Disorder Mother         Stroke    Cancer Brother     Breast Cancer Maternal Aunt 60    Breast Cancer Maternal Cousin Female 70    Cancer Niece 32        pancreatic ca    Hypertension Sister     Hypertension Sister     Other (Other) Sister         TIM    Diabetes Neg      Family history of fractures, osteoporosis, osteopenia  None    CURRENT MEDICATIONS:    Current Outpatient Medications   Medication Sig Dispense Refill    hydroCHLOROthiazide 12.5 MG Oral Cap Take 2 times a week as needed for leg swelling.      dilTIAZem HCl 120 MG Oral Tab Take 1 tablet (120 mg total) by mouth 4 (four) times daily.      levothyroxine 125 MCG Oral Tab TAKE 1 TABLET IN THE MORNING ON MONDAYS, WEDNESDAYS, AND FRIDAYS. TAKE  MCG DOSE ON ALL OTHER DAYS. 39 tablet 0    potassium chloride 10 MEQ Oral Tab CR Take 1 tablet (10 mEq total) by mouth daily. 90 tablet 1    levothyroxine 137 MCG Oral Tab Take 137 mcg on Monday, Wed, Fridays.  Take 125 mcg all other days. 50 tablet 1    ATORVASTATIN 40 MG Oral Tab TAKE 1 TABLET EVERY NIGHT 90 tablet 1    omeprazole 20 MG Oral Capsule Delayed Release Take 1 capsule (20 mg total) by mouth before breakfast. 90 capsule 1    cyanocobalamin 500 MCG Oral Tab Take 1 tablet (500 mcg total) by mouth daily.      aspirin 81 MG Oral Tab EC Take 1 tablet (81 mg total) by mouth daily.      docusate sodium 100 MG Oral Cap Take 100 mg by mouth 2 (two) times daily. Stop if loose stool. 20 capsule 0    multivitamin with minerals Oral Tab Take 1 tablet by mouth daily. 30 tablet 0    Calcium Carbonate 600 MG Oral Tab 1 tab twice a day with food. 60 tablet 0    Cholecalciferol (VITAMIN D3) 400 units Oral Tab Take by mouth.      Vitamin C 500 MG Oral Tab Take 1 tablet (500 mg total) by mouth daily.         ALLERGIES:  Allergies   Allergen Reactions    Robaxin [Methocarbamol] ITCHING         ASSESSMENTS:   PAIN ASSESSMENT: (Patient reports pain) yes  In knees, elbows  PAIN SCALE: (0-10, please indicate if  applicable):  5    FALL ASSESSMENT: yes, is getting bathroom and house retrofitted, once things get better (pandemic -wise)    FUNCTIONAL ASSESSMENT (Able to perform all ADLs):  yes    REVIEW OF SYSTEMS:  No falls in past 12 months, no loss of height- none  Constitutional: Negative for: weight change, fever, fatigue, cold/heat intolerance  Eyes: Negative for:  Visual changes, proptosis, blurring  ENT: Negative for:  dysphagia, neck swelling, dysphonia  Respiratory: Negative for:  dyspnea, cough  Cardiovascular: Negative for:  chest pain, palpitations, orthopnea  GI: Negative for:  abdominal pain, nausea, vomiting, diarrhea, constipation, bleeding  Neurology: Negative for: headache, numbness, weakness  Genito-Urinary: Negative for: dysuria, frequency  Psychiatric: Negative for:  depression, anxiety  Hematology/Lymphatics: Negative for: bruising, lower extremity edema  Endocrine: Negative for: polyuria, polydypsia  Skin: Negative for: rash, blister, cellulitis,      PHYSICAL EXAM:   Vitals:    02/21/24 1709   BP: 121/57   Pulse: 81   Weight: 130 lb (59 kg)   Height: 5' 5\" (1.651 m)       BMI: Body mass index is 21.63 kg/m².     CONSTITUTIONAL:  awake, alert, cooperative, no apparent distress, and appears stated age  PSYCH: normal affect  EYES:  No proptosis, no ptosis, conjunctiva normal  ENT:  Normocephalic, atraumatic  NECK:  Supple, symmetrical, trachea midline, no adenopathy, thyroid symmetric, not enlarged and no tenderness- thyroidectomy scar  HEMATOLOGIC/LYMPHATICS:  no cervical lymphadenopathy and no supraclavicular lymphadenopathy  LUNGS: clear to auscultation bilaterally, no crackles or wheezing  CARDIOVASCULAR:  regular rate and rhythm, normal S1 and S2, no S3 or S4  ABDOMEN:  normal bowel sounds, soft, non-distended, non-tender  SKIN:  no bruising or bleeding, no rashes and no lesions  EXTREMITIES:normal pulses, no edema      DATA:   CMP:    Lab Results   Component Value Date     03/29/2023    K 3.9  03/29/2023     03/29/2023    CO2 33.0 (H) 03/29/2023    BUN 20 (H) 03/29/2023    GLU 98 03/29/2023    ALB 3.8 03/29/2023    CA 9.6 03/29/2023     FLP (Lipid Profile):    Lab Results   Component Value Date    TRIG 59 03/29/2023    HDL 95 (H) 03/29/2023     LDL direct : No components found for: \"LDLDIRECT\"  Microalbumin/Creatinine ratio:  No components found for: \"RUCREAT\", \"RUMICROAL\", \"MCRATIO\"  TSH:    Lab Results   Component Value Date    TSH 3.790 (H) 10/24/2023     ASSESSMENT AND PLAN:    78 year-old woman who presents for follow-up of management of osteoporosis. She used to take alendronate and now has had an infusion of Reclast. She was supposed to have another infusion of Reclast, but this was delayed.     I would like to check a DXA scan in May of 2024, before we decide upon further course of management. We will re-evaluate what to do at that time about further management.    Continue Ca+D 600 mg twice daily (citrate is better for patients on PPI)  Diet: Eat foods with high calcium: millk with vitamin D added, fish from the ocean, yogurt, green leafy vegetables  Exercise: 30 min atleast daily x most days of the week  Alcohol: very rarely  Tobacco: does not smoke  Fall precautions and resistance exercises discussed      She will also return to clinic at that time.     Prior to this encounter, I spent over 15 minutes with preparing for the visit, including reviewing documents from other specialties as well as from PCP and going over test results and imaging studies. During the face to face encounter, I spent an additional 15 minutes which were determined for follow-up. Greater than 50% of the time was spent in counseling, anticipatory guidance, and coordination of care. Patient concerns were answered to the best of my knowledge.          2/21/24  Ruby Hunt MD     Yes

## 2024-02-22 ENCOUNTER — OFFICE VISIT (OUTPATIENT)
Dept: PODIATRY CLINIC | Facility: CLINIC | Age: 79
End: 2024-02-22

## 2024-02-22 DIAGNOSIS — L84 CORN OF TOE: Primary | ICD-10-CM

## 2024-02-22 DIAGNOSIS — M79.674 PAIN OF TOE OF RIGHT FOOT: ICD-10-CM

## 2024-02-22 PROCEDURE — 1159F MED LIST DOCD IN RCRD: CPT | Performed by: STUDENT IN AN ORGANIZED HEALTH CARE EDUCATION/TRAINING PROGRAM

## 2024-02-22 PROCEDURE — 99203 OFFICE O/P NEW LOW 30 MIN: CPT | Performed by: STUDENT IN AN ORGANIZED HEALTH CARE EDUCATION/TRAINING PROGRAM

## 2024-02-22 PROCEDURE — 1126F AMNT PAIN NOTED NONE PRSNT: CPT | Performed by: STUDENT IN AN ORGANIZED HEALTH CARE EDUCATION/TRAINING PROGRAM

## 2024-02-22 NOTE — PROGRESS NOTES
[facility] Podiatry  Progress Note      Penny Roblero is a 78 year old female.   Chief Complaint   Patient presents with    Toe Pain     Right 5th toe - has a corn which is painful with walking - rates pain as 9-10/10 with shoes on              HPI:     Patient is a pleasant 78-year-old female who presents to clinic for evaluation of painful corn to her right fifth digit.  She admits to putting pads on it for relief.  Patient will be going out of [place] for a few months and will be back in April.      Allergies: Robaxin [methocarbamol]    Current Outpatient Medications   Medication Sig Dispense Refill    hydroCHLOROthiazide 12.5 MG Oral Cap Take 2 times a week as needed for leg swelling.      dilTIAZem HCl 120 MG Oral Tab Take 1 tablet (120 mg total) by mouth 4 (four) times daily.      levothyroxine 125 MCG Oral Tab TAKE 1 TABLET IN THE MORNING ON MONDAYS, WEDNESDAYS, AND FRIDAYS. TAKE  MCG DOSE ON ALL OTHER DAYS. 39 tablet 0    potassium chloride 10 MEQ Oral Tab CR Take 1 tablet (10 mEq total) by mouth daily. 90 tablet 1    levothyroxine 137 MCG Oral Tab Take 137 mcg on Monday, Wed, Fridays.  Take 125 mcg all other days. 50 tablet 1    ATORVASTATIN 40 MG Oral Tab TAKE 1 TABLET EVERY NIGHT 90 tablet 1    omeprazole 20 MG Oral Capsule Delayed Release Take 1 capsule (20 mg total) by mouth before breakfast. 90 capsule 1    cyanocobalamin 500 MCG Oral Tab Take 1 tablet (500 mcg total) by mouth daily.      aspirin 81 MG Oral Tab EC Take 1 tablet (81 mg total) by mouth daily.      docusate sodium 100 MG Oral Cap Take 100 mg by mouth 2 (two) times daily. Stop if loose stool. 20 capsule 0    multivitamin with minerals Oral Tab Take 1 tablet by mouth daily. 30 tablet 0    Calcium Carbonate 600 MG Oral Tab 1 tab twice a day with food. 60 tablet 0    Cholecalciferol (VITAMIN D3) 400 units Oral Tab Take by mouth.      Vitamin C 500 MG Oral Tab Take 1 tablet (500 mg total) by mouth daily.        Past Medical History:    Diagnosis Date    Cataract     Cervical vertebral fusion 10/2/2014    Disorder of thyroid     Essential hypertension     High blood pressure     Hyperlipidemia     Hyperthyroidism     Incontinence     Positive FIT (fecal immunochemical test) 10/11/2022    05/10/22 EGD w/cold biopsy & Colonoscopy:  Normal EGD, internal hemorrhoids, tortuous colon    Screen for colon cancer 2022    no polyps noted on c-scope    Visual impairment     Readers      Past Surgical History:   Procedure Laterality Date    BACK SURGERY      CARPAL TUNNEL RELEASE Left     CATARACT Left 3/16/17    CATARACT Right 06/2017    COLONOSCOPY N/A 5/10/2022    Procedure: COLONOSCOPY;  Surgeon: CRISS Fonseca MD;  [facility] ENDOSCOPY    KNEE SURGERY Left 1982    OTHER SURGICAL HISTORY      Bladder nodules removed    OTHER SURGICAL HISTORY      Thyroid nodule removed      Family History   Problem Relation Age of Onset    Cancer Father     Heart Disorder Mother         Stroke    Cancer Brother     Breast Cancer Maternal Aunt 60    Breast Cancer Maternal Cousin Female 70    Cancer Niece 32        pancreatic ca    Hypertension Sister     Hypertension Sister     Other (Other) Sister         TIM    Diabetes Neg       Social History     Socioeconomic History    Marital status:    Tobacco Use    Smoking status: Former     Packs/day: .5     Types: Cigarettes    Smokeless tobacco: Never   Vaping Use    Vaping Use: Never used   Substance and Sexual Activity    Alcohol use: Yes     Alcohol/week: 1.0 standard drink of alcohol     Types: 1 Glasses of wine per week     Comment: ocassionally    Drug use: Not Currently     Types: Cannabis     Comment: Quit when she was 29yrs old    Sexual activity: Not Currently   Other Topics Concern    Reaction to local anesthetic No           REVIEW OF SYSTEMS:     Denies nause, fever, chills  No calf pain  Denies chest pain or SOB      EXAM:   There were no vitals taken for this visit.  GENERAL: well developed, well  nourished, in no apparent distress  EXTREMITIES:   1. Integument: Normal skin temperature and turgor.  Hyperkeratotic tissue on the dorsal aspect of the right fifth PIPJ  2. Vascular: Dorsalis pedis two out of four bilateral and posterior tibial pulses two out of   four bilateral, capillary refill normal.   3. Musculoskeletal: Pain with palpation to right fifth digit   4. Neurological: Normal sharp dull sensation; reflexes normal.             ASSESSMENT AND PLAN:   Diagnoses and all orders for this visit:    Corn of toe    Pain of toe of right foot        Plan:     Patient seen and examined and findings discussed with patient.  We debrided the corn with sterile #15 blade to healthy skin providing relief for patient.  Advised patient to purchase silicone toe sleeves for extra padding.  We also discussed surgical treatment in case conservative treatment fails.  Patient to return to clinic when she is back in April.    The patient indicates understanding of these issues and agrees to the plan.        Julia Liz DPM

## 2024-02-23 PROBLEM — E55.9 VITAMIN D DEFICIENCY: Status: ACTIVE | Noted: 2024-02-23

## 2024-03-01 ENCOUNTER — NURSE TRIAGE (OUTPATIENT)
Dept: INTERNAL MEDICINE CLINIC | Facility: CLINIC | Age: 79
End: 2024-03-01

## 2024-03-01 NOTE — TELEPHONE ENCOUNTER
Action Requested: Summary for Provider     []  Critical Lab, Recommendations Needed  [] Need Additional Advice  []   FYI    []   Need Orders  [] Need Medications Sent to Pharmacy  []  Other     SUMMARY: Per protocol: OV    Future Appointments   Date Time Provider Department Center   3/2/2024  8:30 AM [facility]   5/22/2024 11:15 AM Ruby Hunt MD [facility]     Reason for call: Swelling  Onset: Data Unavailable    Yesterday patient noticed foot and leg swelling. The swelling goes up to both knees. Her last blood pressure was 144/81. She denies shortness of breath or chest pain.     Reason for Disposition   MODERATE swelling of both ankles (e.g., swelling extends up to the knees) AND new-onset or worsening    Protocols used: Leg Swelling and Edema-A-OH

## 2024-03-02 ENCOUNTER — OFFICE VISIT (OUTPATIENT)
Dept: INTERNAL MEDICINE CLINIC | Facility: CLINIC | Age: 79
End: 2024-03-02

## 2024-03-02 VITALS
WEIGHT: 133 LBS | DIASTOLIC BLOOD PRESSURE: 78 MMHG | SYSTOLIC BLOOD PRESSURE: 132 MMHG | HEIGHT: 64 IN | BODY MASS INDEX: 22.71 KG/M2 | HEART RATE: 74 BPM

## 2024-03-02 DIAGNOSIS — G89.29 CHRONIC RIGHT-SIDED LOW BACK PAIN WITHOUT SCIATICA: ICD-10-CM

## 2024-03-02 DIAGNOSIS — R60.0 LOCALIZED EDEMA: Primary | ICD-10-CM

## 2024-03-02 DIAGNOSIS — M54.50 CHRONIC RIGHT-SIDED LOW BACK PAIN WITHOUT SCIATICA: ICD-10-CM

## 2024-03-02 PROCEDURE — 1159F MED LIST DOCD IN RCRD: CPT | Performed by: INTERNAL MEDICINE

## 2024-03-02 PROCEDURE — 3075F SYST BP GE 130 - 139MM HG: CPT | Performed by: INTERNAL MEDICINE

## 2024-03-02 PROCEDURE — 99214 OFFICE O/P EST MOD 30 MIN: CPT | Performed by: INTERNAL MEDICINE

## 2024-03-02 PROCEDURE — 1160F RVW MEDS BY RX/DR IN RCRD: CPT | Performed by: INTERNAL MEDICINE

## 2024-03-02 PROCEDURE — 3008F BODY MASS INDEX DOCD: CPT | Performed by: INTERNAL MEDICINE

## 2024-03-02 PROCEDURE — 3078F DIAST BP <80 MM HG: CPT | Performed by: INTERNAL MEDICINE

## 2024-03-02 PROCEDURE — 1125F AMNT PAIN NOTED PAIN PRSNT: CPT | Performed by: INTERNAL MEDICINE

## 2024-03-02 NOTE — PROGRESS NOTES
Penny Roblero is a 78 year old female.   Chief Complaint   Patient presents with    Back Pain    Leg Swelling     HPI:   Ms. Roblero presents this morning to discuss 2 concerns    She has chronic waxing and waning swelling in both lower legs, especially in both feet.  She was previously on HCTZ, but HCTZ was stopped in early February because of urinary frequency.  She subsequently noticed worsening lower leg swelling and HCTZ was resumed on February 20.  Swelling persists intermittently.  She does try to limit dietary salt and sodium.  She has compression stockings but she does not wear them daily.  No shortness of breath at rest or with activity.  No orthopnea or PND.  No history of heart disease.  No chest pain.  She is now taking HCTZ daily.    Also, since a fall 2 years ago, she has had intermittent right-sided low back pain.  Pain does not radiate and she has no leg pain numbness tingling or weakness.    Medications reviewed, as listed below.  Usual PCP is Dr. Burroughs.  Current Outpatient Medications   Medication Sig Dispense Refill    hydroCHLOROthiazide 12.5 MG Oral Cap Take 1 capsule (12.5 mg total) by mouth every morning. Take 2 times a week as needed for leg swelling.      dilTIAZem HCl 120 MG Oral Tab Take 1 tablet (120 mg total) by mouth 4 (four) times daily.      levothyroxine 125 MCG Oral Tab TAKE 1 TABLET IN THE MORNING ON MONDAYS, WEDNESDAYS, AND FRIDAYS. TAKE  MCG DOSE ON ALL OTHER DAYS. 39 tablet 0    potassium chloride 10 MEQ Oral Tab CR Take 1 tablet (10 mEq total) by mouth daily. 90 tablet 1    levothyroxine 137 MCG Oral Tab Take 137 mcg on Monday, Wed, Fridays.  Take 125 mcg all other days. 50 tablet 1    ATORVASTATIN 40 MG Oral Tab TAKE 1 TABLET EVERY NIGHT 90 tablet 1    omeprazole 20 MG Oral Capsule Delayed Release Take 1 capsule (20 mg total) by mouth before breakfast. 90 capsule 1    cyanocobalamin 500 MCG Oral Tab Take 1 tablet (500 mcg total) by mouth daily.      aspirin 81 MG  Oral Tab EC Take 1 tablet (81 mg total) by mouth daily.      docusate sodium 100 MG Oral Cap Take 100 mg by mouth 2 (two) times daily. Stop if loose stool. 20 capsule 0    multivitamin with minerals Oral Tab Take 1 tablet by mouth daily. 30 tablet 0    Calcium Carbonate 600 MG Oral Tab 1 tab twice a day with food. 60 tablet 0    Cholecalciferol (VITAMIN D3) 400 units Oral Tab Take by mouth.      Vitamin C 500 MG Oral Tab Take 1 tablet (500 mg total) by mouth daily.       Allergies   Allergen Reactions    Robaxin [Methocarbamol] ITCHING      Past Medical History:   Diagnosis Date    Cataract     Cervical vertebral fusion 10/2/2014    Disorder of thyroid     Essential hypertension     High blood pressure     Hyperlipidemia     Hyperthyroidism     Incontinence     Positive FIT (fecal immunochemical test) 10/11/2022    05/10/22 EGD w/cold biopsy & Colonoscopy:  Normal EGD, internal hemorrhoids, tortuous colon    Screen for colon cancer 2022    no polyps noted on c-scope    Visual impairment     Readers     Past Surgical History:   Procedure Laterality Date    BACK SURGERY      CARPAL TUNNEL RELEASE Left     CATARACT Left 3/16/17    CATARACT Right 06/2017    COLONOSCOPY N/A 5/10/2022    Procedure: COLONOSCOPY;  Surgeon: CRISS Fonseca MD;  Location: [facility] ENDOSCOPY    KNEE SURGERY Left 1982    OTHER SURGICAL HISTORY      Bladder nodules removed    OTHER SURGICAL HISTORY      Thyroid nodule removed      Social History:  Social History     Socioeconomic History    Marital status:    Tobacco Use    Smoking status: Former     Packs/day: .5     Types: Cigarettes    Smokeless tobacco: Never   Vaping Use    Vaping Use: Never used   Substance and Sexual Activity    Alcohol use: Yes     Alcohol/week: 1.0 standard drink of alcohol     Types: 1 Glasses of wine per week     Comment: ocassionally    Drug use: Not Currently     Types: Cannabis     Comment: Quit when she was 29yrs old    Sexual activity: Not Currently   Other  Topics Concern    Reaction to local anesthetic No        EXAM:   GENERAL: Pleasant female appearing well in no distress  /78 (BP Location: Right arm, Patient Position: Sitting, Cuff Size: adult)   Pulse 74   Ht 5' 4\" (1.626 m)   Wt 133 lb (60.3 kg)   BMI 22.83 kg/m²   LUNGS: Resonant to percussion and clear to auscultation without crackles or wheezes  BACK: Mild right-sided lumbar paraspinal muscle tenderness without spasm and without spinal or CVA tenderness  CARDIAC: Rhythm regular S1 S2 normal without murmur, with 1+ pitting edema distal lower extremities bilaterally and symmetrically  ABDOMEN: Bowel sounds normal soft nontender without mass or hepatosplenomegaly  EXTREMITIES: Straight leg raising negative bilaterally.  No calf erythema warmth tenderness or palpable cord  NEURO: Reflexes 1-2+ bilateral patellar and bilateral Achilles.  Strength 5/5 bilateral lower extremities without weakness      ASSESSMENT AND PLAN:   1. Localized edema  Chronic.  Continue HCTZ daily.  Would not increase dose given urinary frequency  Reinforced dietary sodium restriction and leg elevation while seated  Recommend she wear compression stockings daily.    2. Chronic right-sided low back pain without sciatica  No symptoms or signs of lumbar radiculopathy  Recommend rest, Tylenol as needed, heat application 2-3 times daily as needed      The patient indicates understanding of these issues and agrees to the plan.  The patient is asked to return as needed.    Orlin Lucia MD  3/2/2024  8:51 AM

## 2024-03-02 NOTE — PATIENT INSTRUCTIONS
Please continue to limit the amount of salt in your diet, and elevate your legs while seated  Continue HCTZ daily  Wear compression stockings daily  Take Tylenol and apply heat to your right lower back as needed

## 2024-03-07 ENCOUNTER — MED REC SCAN ONLY (OUTPATIENT)
Dept: INTERNAL MEDICINE CLINIC | Facility: CLINIC | Age: 79
End: 2024-03-07

## 2024-03-11 NOTE — PROGRESS NOTES
AUDIOLOGY REPORT      Nahum Mehta is a 75year old female     Referring Provider: Medardo Vera   YOB: 1945  Medical Record: [id number]      Patient Hearing History:  Patient reported left ear pain and dizziness.   She noted a history of n
Imaging Studies

## 2024-03-18 ENCOUNTER — TELEPHONE (OUTPATIENT)
Dept: INTERNAL MEDICINE CLINIC | Facility: CLINIC | Age: 79
End: 2024-03-18

## 2024-03-19 RX ORDER — POTASSIUM CHLORIDE 750 MG/1
10 TABLET, EXTENDED RELEASE ORAL DAILY
Qty: 90 TABLET | Refills: 3 | Status: SHIPPED | OUTPATIENT
Start: 2024-03-19

## 2024-03-21 NOTE — TELEPHONE ENCOUNTER
potassium chloride 10 MEQ Oral Tab CR, Take 1 tablet (10 mEq total) by mouth daily., Disp: 90 tablet, Rfl: 1  
1st attempt- [employer] message sent to schedule appointment  
CSS: please reach out to patient. Remind her to schedule appt to establish care with Dr Mason.     
Dr. Nila Mason - although you have not seen patient, can you advise? Because former Dr. Thomas patient and has you listed at PCP, can you advise on potassium refill request?     Pended.         Former Dr. Thomas patient  Last Seen by Dr Lucia   New PCP Dr Nila Mason   Potassium last ordered by Dr. Burroughs   
Potassium was normal when she had it drawn this month so most likely it is normal because she has been taking the potassium chronically so we will refill.  Please ask her to make an appointment to establish care  
Talked to patient told her message below understood and she will make an appointment thru her MyChart.  
Statement Selected

## 2024-03-22 DIAGNOSIS — E03.9 ACQUIRED HYPOTHYROIDISM: ICD-10-CM

## 2024-03-22 DIAGNOSIS — E78.00 HYPERCHOLESTEROLEMIA: ICD-10-CM

## 2024-03-22 RX ORDER — OMEPRAZOLE 20 MG/1
20 CAPSULE, DELAYED RELEASE ORAL
Qty: 90 CAPSULE | Refills: 3 | Status: SHIPPED | OUTPATIENT
Start: 2024-03-22

## 2024-03-22 NOTE — TELEPHONE ENCOUNTER
Refill passed per [facility] protocol.     Requested Prescriptions   Pending Prescriptions Disp Refills    OMEPRAZOLE 20 MG Oral Capsule Delayed Release [Pharmacy Med Name: OMEPRAZOLE 20 MG Capsule Delayed Release] 90 capsule 3     Sig: TAKE 1 CAPSULE BEFORE BREAKFAST       Gastrointestional Medication Protocol Passed - 3/21/2024  3:18 PM        Passed - In person appointment or virtual visit in the past 12 mos or appointment in next 3 mos     Recent Outpatient Visits              2 weeks ago Localized edema    [facility] Orlin Lucia MD    Office Visit    4 weeks ago [place] of toe    [facility] Julia Liz DPM    Office Visit    1 month ago Age-related osteoporosis without current pathological fracture    [facility] Ruby Hunt MD    Office Visit    1 month ago Essential hypertension    [facility] Katarzyna Garrido APRN    Office Visit    1 month ago Acquired hypothyroidism    [facility] Lizy Burroughs MD    Office Visit          Future Appointments         Provider Department Appt Notes    In 2 months Ruby Hunt MD [facility] 3 months

## 2024-03-22 NOTE — TELEPHONE ENCOUNTER
Current Outpatient Medications:       levothyroxine 137 MCG Oral Tab, Take 137 mcg on Monday, Wed, Fridays.  Take 125 mcg all other days., Disp: 50 tablet, Rfl: 1      ATORVASTATIN 40 MG Oral Tab, TAKE 1 TABLET EVERY NIGHT, Disp: 90 tablet, Rfl: 1

## 2024-03-25 NOTE — TELEPHONE ENCOUNTER
Refill passed per [facility] protocol.  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       Cholesterol Medication Protocol Passed - 3/22/2024  3:07 PM        Passed - ALT < 80     Lab Results   Component Value Date    ALT 17 03/29/2023             Passed - ALT resulted within past year        Passed - Lipid panel within past 12 months     Lab Results   Component Value Date    CHOLEST 257 (H) 03/29/2023    TRIG 59 03/29/2023    HDL 95 (H) 03/29/2023     (H) 03/29/2023    VLDL 11 03/29/2023    NONHDLC 162 (H) 03/29/2023             Passed - In person appointment or virtual visit in the past 12 mos or appointment in next 3 mos     Recent Outpatient Visits              3 weeks ago Localized edema    [facility] Orlin Lucia MD    Office Visit    1 month ago [place] of toe    [facility] Julia Liz DPM    Office Visit    1 month ago Age-related osteoporosis without current pathological fracture    [facility] Ruby [facility]    Office Visit    1 month ago Essential hypertension    [facility] Katarzyna Garrido APRN    Office Visit    1 month ago Acquired hypothyroidism    [facility] Lizy Burroughs MD    Office Visit          Future Appointments         Provider Department Appt Notes    In 1 month [facility] 3 [facility]                 levothyroxine 125 MCG Oral Tab 39 tablet 0     Sig: TAKE 1 TABLET IN THE MORNING ON MONDAYS, WEDNESDAYS, AND FRIDAYS. TAKE  MCG DOSE ON ALL OTHER DAYS.       Thyroid Medication Protocol Failed - 3/22/2024  3:07 PM         Failed - Last TSH value is normal     Lab Results   Component Value Date    TSH 3.790 (H) 10/24/2023                 Passed - TSH in past 12 months        Passed - In person appointment or virtual visit in the past 12 mos or appointment in next 3 mos     Recent Outpatient Visits              3 weeks ago Localized edema    [facility]    Office Visit    1 month ago [place] [facility] toe    [facility]    [facility] Visit    1 month ago Age-related osteoporosis without current pathological fracture    [facility]    Office Visit    1 month ago Essential hypertension    [facility]    Office Visit    1 month ago Acquired hypothyroidism    [facility]    [facility]          Future Appointments         Provider Department Appt Notes    In 1 month Ruby Hunt MD [facility] 3 months                  Recent Outpatient Visits              3 weeks ago Localized edema    [facility]    Office Visit    1 month ago [place] [facility] toe    [facility]    Office Visit    1 month ago Age-related osteoporosis without current pathological fracture    [facility] Ruby Hunt MD    Office Visit    1 month ago Essential hypertension    [facility]    Office Visit    1 month ago Acquired hypothyroidism    [facility] Lizy Burroughs MD    Office Visit          Future  Appointments         Provider Department Appt Notes    In 1 month Ruby Hunt MD [facility], [facility] 3 months

## 2024-03-25 NOTE — TELEPHONE ENCOUNTER
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       Cholesterol Medication Protocol Passed - 3/22/2024  3:07 PM        Passed - ALT < 80     Lab Results   Component Value Date    ALT 17 03/29/2023             Passed - ALT resulted within past year        Passed - Lipid panel within past 12 months     Lab Results   Component Value Date    CHOLEST 257 (H) 03/29/2023    TRIG 59 03/29/2023    HDL 95 (H) 03/29/2023     (H) 03/29/2023    VLDL 11 03/29/2023    NONHDLC 162 (H) 03/29/2023             Passed - In person appointment or virtual visit in the past 12 mos or appointment in next 3 mos     Recent Outpatient Visits              3 weeks ago Localized edema    [facility] Orlin Lucia MD    Office Visit    1 month ago [place] of toe    [facility] Julia Liz DPM    Office Visit    1 month ago Age-related osteoporosis without current pathological fracture    [facility] Ruby Hunt MD    Office Visit    1 month ago Essential hypertension    [facility] Katarzyna Garrido APRN    Office Visit    1 month ago Acquired hypothyroidism    [facility] Lizy Burroughs MD    Office Visit          Future Appointments         Provider Department Appt Notes    In 1 month Ruby Hunt MD [facility] 3 months                 levothyroxine 125 MCG Oral Tab 39 tablet 0     Sig: TAKE 1 TABLET IN THE MORNING ON MONDAYS, WEDNESDAYS, AND FRIDAYS. TAKE  MCG DOSE ON ALL OTHER DAYS.       Thyroid Medication Protocol Failed - 3/22/2024   3:07 PM        Failed - Last TSH value is normal     Lab Results   Component Value Date    TSH 3.790 (H) 10/24/2023                 Passed - TSH in past 12 months        Passed - In person appointment or virtual visit in the past 12 mos or appointment in next 3 mos     Recent Outpatient Visits              3 weeks ago Localized edema    [facility]    [facility]    1 month ago [place] [facility]    [facility]    1 month ago Age-related osteoporosis without current pathological fracture    [facility]    [facility]    1 month ago [facility]ential hypertension    [facility]    Office Visit    1 month ago Acquired hypothyroidism    [facility]    [facility]          Future Appointments         Provider Department Appt Notes    In 1 month Ruby Hunt MD [facility] 3 months                  Recent Outpatient Visits              3 weeks ago Localized edema    [facility]    [facility]    1 month ago [place] [facility] toe    [facility]    Office Visit    1 month ago Age-related osteoporosis without current pathological fracture    [facility] Ruby Hunt MD    Office Visit    1 month ago Essential hypertension    [facility]    Office Visit    1 month ago Acquired hypothyroidism    [facility] Lizy Burroughs MD    Office Visit           Future Appointments         Provider Department Appt Notes    In 1 month Ruby Hunt MD [facility], [facility] 3 months

## 2024-03-26 RX ORDER — LEVOTHYROXINE SODIUM 0.12 MG/1
TABLET ORAL
Qty: 39 TABLET | Refills: 0 | Status: SHIPPED | OUTPATIENT
Start: 2024-03-26

## 2024-03-26 RX ORDER — ATORVASTATIN CALCIUM 40 MG/1
40 TABLET, FILM COATED ORAL NIGHTLY
Qty: 90 TABLET | Refills: 0 | Status: SHIPPED | OUTPATIENT
Start: 2024-03-26

## 2024-05-02 ENCOUNTER — NURSE TRIAGE (OUTPATIENT)
Dept: INTERNAL MEDICINE CLINIC | Facility: CLINIC | Age: 79
End: 2024-05-02

## 2024-05-02 ENCOUNTER — OFFICE VISIT (OUTPATIENT)
Dept: INTERNAL MEDICINE CLINIC | Facility: CLINIC | Age: 79
End: 2024-05-02
Payer: MEDICARE

## 2024-05-02 VITALS
HEIGHT: 64 IN | WEIGHT: 127.81 LBS | BODY MASS INDEX: 21.82 KG/M2 | HEART RATE: 70 BPM | DIASTOLIC BLOOD PRESSURE: 80 MMHG | SYSTOLIC BLOOD PRESSURE: 133 MMHG | OXYGEN SATURATION: 97 %

## 2024-05-02 DIAGNOSIS — E03.9 ACQUIRED HYPOTHYROIDISM: ICD-10-CM

## 2024-05-02 DIAGNOSIS — I47.19 ATRIAL TACHYCARDIA (HCC): ICD-10-CM

## 2024-05-02 DIAGNOSIS — I10 ESSENTIAL HYPERTENSION: ICD-10-CM

## 2024-05-02 DIAGNOSIS — I67.9 CEREBROVASCULAR DISEASE: ICD-10-CM

## 2024-05-02 DIAGNOSIS — R07.9 CHEST PAIN, UNSPECIFIED TYPE: Primary | ICD-10-CM

## 2024-05-02 DIAGNOSIS — E78.5 HYPERLIPIDEMIA, UNSPECIFIED HYPERLIPIDEMIA TYPE: ICD-10-CM

## 2024-05-02 PROCEDURE — 3008F BODY MASS INDEX DOCD: CPT | Performed by: INTERNAL MEDICINE

## 2024-05-02 PROCEDURE — 1159F MED LIST DOCD IN RCRD: CPT | Performed by: INTERNAL MEDICINE

## 2024-05-02 PROCEDURE — 1125F AMNT PAIN NOTED PAIN PRSNT: CPT | Performed by: INTERNAL MEDICINE

## 2024-05-02 PROCEDURE — 3079F DIAST BP 80-89 MM HG: CPT | Performed by: INTERNAL MEDICINE

## 2024-05-02 PROCEDURE — 1160F RVW MEDS BY RX/DR IN RCRD: CPT | Performed by: INTERNAL MEDICINE

## 2024-05-02 PROCEDURE — 99214 OFFICE O/P EST MOD 30 MIN: CPT | Performed by: INTERNAL MEDICINE

## 2024-05-02 PROCEDURE — 3075F SYST BP GE 130 - 139MM HG: CPT | Performed by: INTERNAL MEDICINE

## 2024-05-02 RX ORDER — LEVOTHYROXINE SODIUM 137 UG/1
TABLET ORAL
Qty: 50 TABLET | Refills: 1 | Status: SHIPPED | OUTPATIENT
Start: 2024-05-02

## 2024-05-02 NOTE — PROGRESS NOTES
Penny Rbolero is a 78 year old female who is here for  1. Chest pain, unspecified type    2. Essential hypertension    3. Atrial tachycardia (HCC)    4. Cerebrovascular disease    5. Hyperlipidemia, unspecified hyperlipidemia type    6. Acquired hypothyroidism          HPI:   Penny Roblero is a 78 year old female presents for persistant vertigo and chest pain for 4 days, on-off,     Did not pass out, vertigo chronic but lately room spinning x 4 days  Been in bed  Poor appetite  No GI symptoms  No URI symptoms  No urinary symptoms    Old visits; sister had a stroke and was out of state for a few months.  Hypothyroid takes  mcg, and 137 mcg on the other days.     Past Medical History:    Cataract    Cervical vertebral fusion    Disorder of thyroid    Essential hypertension    High blood pressure    Hyperlipidemia    Hyperthyroidism    Incontinence    Positive FIT (fecal immunochemical test)    05/10/22 EGD w/cold biopsy & Colonoscopy:  Normal EGD, internal hemorrhoids, tortuous colon    Screen for colon cancer    no polyps noted on c-scope    Visual impairment    Readers     Past Surgical History:   Procedure Laterality Date    Back surgery      Carpal tunnel release Left     Cataract Left 3/16/17    Cataract Right 06/2017    Colonoscopy N/A 5/10/2022    Procedure: COLONOSCOPY;  Surgeon: CRISS Fonseca MD;  Location: [facility] ENDOSCOPY    Knee surgery Left 1982    Other surgical history      Bladder nodules removed    Other surgical history      Thyroid nodule removed       Current Outpatient Medications:     levothyroxine 137 MCG Oral Tab, Take 137 mcg on Monday, Wed, Fridays.  Take 125 mcg all other days., Disp: 50 tablet, Rfl: 1    atorvastatin 40 MG Oral Tab, Take 1 tablet (40 mg total) by mouth nightly., Disp: 90 tablet, Rfl: 0    levothyroxine 125 MCG Oral Tab, TAKE 1 TABLET IN THE MORNING ON MONDAYS, WEDNESDAYS, AND FRIDAYS. TAKE  MCG DOSE ON ALL OTHER DAYS., Disp: 39 tablet, Rfl: 0    omeprazole 20  MG Oral Capsule Delayed Release, Take 1 capsule (20 mg total) by mouth before breakfast., Disp: 90 capsule, Rfl: 3    potassium chloride 10 MEQ Oral Tab CR, Take 1 tablet (10 mEq total) by mouth daily., Disp: 90 tablet, Rfl: 3    hydroCHLOROthiazide 12.5 MG Oral Cap, Take 1 capsule (12.5 mg total) by mouth every morning. Take 2 times a week as needed for leg swelling., Disp: , Rfl:     dilTIAZem HCl 120 MG Oral Tab, Take 1 tablet (120 mg total) by mouth 4 (four) times daily., Disp: , Rfl:     cyanocobalamin 500 MCG Oral Tab, Take 1 tablet (500 mcg total) by mouth daily., Disp: , Rfl:     aspirin 81 MG Oral Tab EC, Take 1 tablet (81 mg total) by mouth daily., Disp: , Rfl:     docusate sodium 100 MG Oral Cap, Take 100 mg by mouth 2 (two) times daily. Stop if loose stool., Disp: 20 capsule, Rfl: 0    multivitamin with minerals Oral Tab, Take 1 tablet by mouth daily., Disp: 30 tablet, Rfl: 0    Calcium Carbonate 600 MG Oral Tab, 1 tab twice a day with food., Disp: 60 tablet, Rfl: 0    Cholecalciferol (VITAMIN D3) 400 units Oral Tab, Take by mouth., Disp: , Rfl:     Vitamin C 500 MG Oral Tab, Take 1 tablet (500 mg total) by mouth daily., Disp: , Rfl:     Allergies:  Allergies   Allergen Reactions    Robaxin [Methocarbamol] ITCHING     Social History     Socioeconomic History    Marital status:      Spouse name: Not on file    Number of children: Not on file    Years of education: Not on file    Highest education level: Not on file   Occupational History    Not on file   Tobacco Use    Smoking status: Former     Current packs/day: 0.50     Types: Cigarettes    Smokeless tobacco: Never   Vaping Use    Vaping status: Never Used   Substance and Sexual Activity    Alcohol use: Yes     Alcohol/week: 1.0 standard drink of alcohol     Types: 1 Glasses of wine per week     Comment: ocassionally    Drug use: Not Currently     Types: Cannabis     Comment: Quit when she was 29yrs old    Sexual activity: Not Currently   Other  Topics Concern    Grew up on a farm Not Asked    History of tanning Not Asked    Outdoor occupation Not Asked    Breast feeding Not Asked    Reaction to local anesthetic No   Social History Narrative    Not on file     Social Determinants of Health     Financial Resource Strain: Not on file   Food Insecurity: Not on file   Transportation Needs: Not on file   Physical Activity: Not on file   Stress: Not on file   Social Connections: Not on file   Housing Stability: Not on file       REVIEW OF SYSTEMS:     GENERAL HEALTH: No fevers, chills, sweats, fatigue  VISION: No recent vision problems, blurry vision or double vision  HEENT: No decreased hearing ear pain nasal congestion or sore throat  SKIN: denies any unusual skin lesions or rashes  RESPIRATORY: denies shortness of breath, cough, wheezing  CARDIOVASCULAR: chest pain , no palpitations, swelling in feet  GI: denies abdominal pain and denies heartburn, nausea or vomiting  : No Pain on urination, change in the color of urine, discharge,   MUS: No back pain, joint pain, muscle pain  NEURO: dizziness, denies headaches , anxiety, depression    EXAM:     Vitals:    05/02/24 0913   BP: 133/80   Pulse: 70   SpO2: 97%   Weight: 127 lb 12.8 oz (58 kg)   Height: 5' 4\" (1.626 m)     GENERAL: well developed, well nourished,in no apparent distress  SKIN: no rashes,no suspicious lesions  HEENT: atraumatic, normocephalic,ears and throat are clear,   NECK: supple,no adenopathy,  LUNGS: clear to auscultation, no wheeze  CARDIO: RRR without murmur  GI: good BS's,no masses or tenderness  EXTREMITIES: no cyanosis, or edema    ASSESSMENT AND PLAN:   1. Chest pain, unspecified type  2. Essential hypertension  3. Atrial tachycardia (HCC)  4. Cerebrovascular disease  5. Hyperlipidemia, unspecified hyperlipidemia type  -chest pain, on-off, with persistant dizziness x 5 days  -no syncope  -no GI, , or URI symptoms  Plan:  -patient needs to go to ER to r/o cardio/neuro disease  -patient  understands but frustrated, declined ambulance and wants to go  her grandson to go together to the ER, verbalized understanding risks.    6. Acquired hypothyroidism  -TSH 3.7 10/2023  -takes levothyroxine 125 mcg MWF, and 137 mcg on the other days.     7. Essential hypertension  -been experiencing increased urinartion frequency  -/64  Plan:  -stopped hydrochlorothiazide but swelling in the legs increased, resumed by Katarzyna Hemphill as needed for leg swelling  -continue diltiazem   -return to office in 2 weeks    3. Gastroesophageal reflux disease, unspecified whether esophagitis present  -stable on omeprazole        The patient indicates understanding of these issues and agrees to the plan.    Return GO TO ER..        Lizy Burroughs MD  2/8/2024

## 2024-05-02 NOTE — TELEPHONE ENCOUNTER
Hi, this patient should have been sent to the ER. She has been having chest pain and persistent dizziness.   I sent her to the ER.

## 2024-05-02 NOTE — TELEPHONE ENCOUNTER
Action Requested: Summary for Provider     []  Critical Lab, Recommendations Needed  [] Need Additional Advice  []   FYI    []   Need Orders  [] Need Medications Sent to Pharmacy  []  Other     SUMMARY: Per protocol advised :  Office visit   Future Appointments   Date Time Provider Department Center   2024  9:20 AM Lizy Burroughs MD [facility]   2024 11:15 AM Ruby Hunt MD [facility]     Reason for call: Chest Pain  Onset: Data Unavailable    Patient calling ( name and  of patient verified )  reports a pain to the right side of her chest , has had pain for a few days,   and has been having dizzy \" spells \" for  a few days      Yue active chest pain now, no shortness of breath     Advised to be seen ASAP     Reason for Disposition   All other patients with chest pain (Exception: fleeting chest pain lasting a few seconds)    Protocols used: Chest Pain-A-[place]

## 2024-05-03 NOTE — TELEPHONE ENCOUNTER
NO ED encounter, nothing under CARE EVERYWHERE.  RN tried calling patient's number, rings multiple times and then busy signal, no voice mail .    [employer] message sent .       Dr Burroughs's note 5/2/24;  5. Hyperlipidemia, unspecified hyperlipidemia type  -chest pain, on-off, with persistant dizziness x 5 days  -no syncope  -no GI, , or URI symptoms  Plan:  -patient needs to go to ER to r/o cardio/neuro disease  -patient understands but frustrated, declined ambulance and wants to go  her grandson to go together to the ER, verbalized understanding risks.      Instructions      Return GO TO ER..        Future Appointments   Date Time Provider Department Center   5/22/2024 11:15 AM Ruby Hunt MD [facility]

## 2024-05-04 NOTE — TELEPHONE ENCOUNTER
Patient states she is doing fine, She did not go to the emergency room. Patient states she probably had indigestion, denies chest pain or dizziness.    Discussed with patient that indigestion could be sign of heart problem, cardiac event. Patient verbalized understanding and once again said she was fine and not going to the ER.

## 2024-05-15 ENCOUNTER — APPOINTMENT (OUTPATIENT)
Dept: CT IMAGING | Facility: HOSPITAL | Age: 79
End: 2024-05-15
Attending: EMERGENCY MEDICINE

## 2024-05-15 ENCOUNTER — APPOINTMENT (OUTPATIENT)
Dept: GENERAL RADIOLOGY | Facility: HOSPITAL | Age: 79
End: 2024-05-15
Attending: EMERGENCY MEDICINE

## 2024-05-15 ENCOUNTER — HOSPITAL ENCOUNTER (EMERGENCY)
Facility: HOSPITAL | Age: 79
Discharge: HOME | End: 2024-05-15
Attending: EMERGENCY MEDICINE

## 2024-05-15 VITALS
BODY MASS INDEX: 22.53 KG/M2 | DIASTOLIC BLOOD PRESSURE: 89 MMHG | OXYGEN SATURATION: 97 % | HEART RATE: 56 BPM | SYSTOLIC BLOOD PRESSURE: 139 MMHG | TEMPERATURE: 99 F | HEIGHT: 64 IN | RESPIRATION RATE: 13 BRPM | WEIGHT: 132 LBS

## 2024-05-15 DIAGNOSIS — R07.9 CHEST PAIN OF UNCERTAIN ETIOLOGY: Primary | ICD-10-CM

## 2024-05-15 DIAGNOSIS — E03.9 ACQUIRED HYPOTHYROIDISM: ICD-10-CM

## 2024-05-15 LAB
ALBUMIN SERPL-MCNC: 4.4 G/DL (ref 3.2–4.8)
ALBUMIN/GLOB SERPL: 1.4 {RATIO} (ref 1–2)
ALP LIVER SERPL-CCNC: 55 U/L
ALT SERPL-CCNC: 11 U/L
ANION GAP SERPL CALC-SCNC: 4 MMOL/L (ref 0–18)
AST SERPL-CCNC: 19 U/L (ref ?–34)
ATRIAL RATE: 63 BPM
ATRIAL RATE: 66 BPM
BASOPHILS # BLD AUTO: 0.03 X10(3) UL (ref 0–0.2)
BASOPHILS NFR BLD AUTO: 0.5 %
BILIRUB SERPL-MCNC: 1.3 MG/DL (ref 0.2–1.1)
BUN BLD-MCNC: 10 MG/DL (ref 9–23)
BUN/CREAT SERPL: 14.3 (ref 10–20)
CALCIUM BLD-MCNC: 9.6 MG/DL (ref 8.7–10.4)
CHLORIDE SERPL-SCNC: 108 MMOL/L (ref 98–112)
CO2 SERPL-SCNC: 30 MMOL/L (ref 21–32)
CREAT BLD-MCNC: 0.7 MG/DL
D DIMER PPP FEU-MCNC: 1.4 UG/ML FEU (ref ?–0.79)
DEPRECATED RDW RBC AUTO: 46.8 FL (ref 35.1–46.3)
EGFRCR SERPLBLD CKD-EPI 2021: 88 ML/MIN/1.73M2 (ref 60–?)
EOSINOPHIL # BLD AUTO: 0.05 X10(3) UL (ref 0–0.7)
EOSINOPHIL NFR BLD AUTO: 0.9 %
ERYTHROCYTE [DISTWIDTH] IN BLOOD BY AUTOMATED COUNT: 13.2 % (ref 11–15)
GLOBULIN PLAS-MCNC: 3.1 G/DL (ref 2–3.5)
GLUCOSE BLD-MCNC: 88 MG/DL (ref 70–99)
HCT VFR BLD AUTO: 35.8 %
HGB BLD-MCNC: 11.1 G/DL
IMM GRANULOCYTES # BLD AUTO: 0.01 X10(3) UL (ref 0–1)
IMM GRANULOCYTES NFR BLD: 0.2 %
LYMPHOCYTES # BLD AUTO: 1.76 X10(3) UL (ref 1–4)
LYMPHOCYTES NFR BLD AUTO: 31.8 %
MCH RBC QN AUTO: 30.2 PG (ref 26–34)
MCHC RBC AUTO-ENTMCNC: 31 G/DL (ref 31–37)
MCV RBC AUTO: 97.5 FL
MONOCYTES # BLD AUTO: 0.4 X10(3) UL (ref 0.1–1)
MONOCYTES NFR BLD AUTO: 7.2 %
NEUTROPHILS # BLD AUTO: 3.28 X10 (3) UL (ref 1.5–7.7)
NEUTROPHILS # BLD AUTO: 3.28 X10(3) UL (ref 1.5–7.7)
NEUTROPHILS NFR BLD AUTO: 59.4 %
OSMOLALITY SERPL CALC.SUM OF ELEC: 292 MOSM/KG (ref 275–295)
P AXIS: 57 DEGREES
P AXIS: 66 DEGREES
P-R INTERVAL: 154 MS
P-R INTERVAL: 166 MS
PLATELET # BLD AUTO: 240 10(3)UL (ref 150–450)
POTASSIUM SERPL-SCNC: 3.6 MMOL/L (ref 3.5–5.1)
PROT SERPL-MCNC: 7.5 G/DL (ref 5.7–8.2)
Q-T INTERVAL: 384 MS
Q-T INTERVAL: 416 MS
QRS DURATION: 66 MS
QRS DURATION: 72 MS
QTC CALCULATION (BEZET): 402 MS
QTC CALCULATION (BEZET): 425 MS
R AXIS: 13 DEGREES
R AXIS: 18 DEGREES
RBC # BLD AUTO: 3.67 X10(6)UL
SODIUM SERPL-SCNC: 142 MMOL/L (ref 136–145)
T AXIS: 37 DEGREES
T AXIS: 46 DEGREES
TROPONIN I SERPL HS-MCNC: 3 NG/L
TROPONIN I SERPL HS-MCNC: 3 NG/L
VENTRICULAR RATE: 63 BPM
VENTRICULAR RATE: 66 BPM
WBC # BLD AUTO: 5.5 X10(3) UL (ref 4–11)

## 2024-05-15 PROCEDURE — 85025 COMPLETE CBC W/AUTO DIFF WBC: CPT | Performed by: EMERGENCY MEDICINE

## 2024-05-15 PROCEDURE — 99285 EMERGENCY DEPT VISIT HI MDM: CPT

## 2024-05-15 PROCEDURE — 93005 ELECTROCARDIOGRAM TRACING: CPT

## 2024-05-15 PROCEDURE — 93010 ELECTROCARDIOGRAM REPORT: CPT

## 2024-05-15 PROCEDURE — 80053 COMPREHEN METABOLIC PANEL: CPT | Performed by: EMERGENCY MEDICINE

## 2024-05-15 PROCEDURE — 84443 ASSAY THYROID STIM HORMONE: CPT

## 2024-05-15 PROCEDURE — 84484 ASSAY OF TROPONIN QUANT: CPT | Performed by: EMERGENCY MEDICINE

## 2024-05-15 PROCEDURE — 36415 COLL VENOUS BLD VENIPUNCTURE: CPT

## 2024-05-15 PROCEDURE — 71260 CT THORAX DX C+: CPT | Performed by: EMERGENCY MEDICINE

## 2024-05-15 PROCEDURE — 85379 FIBRIN DEGRADATION QUANT: CPT | Performed by: EMERGENCY MEDICINE

## 2024-05-15 PROCEDURE — 71045 X-RAY EXAM CHEST 1 VIEW: CPT | Performed by: EMERGENCY MEDICINE

## 2024-05-15 NOTE — ED PROVIDER NOTES
Patient Seen in: [facility] Emergency Department    History     Chief Complaint   Patient presents with    Chest Pain       HPI    78-year-old female with a history of hypertension who presents to the emergency department with 1 week of intermittent sensation of palpitations as well as 3 hours ago feeling slight chest pressure associated with the palpitations which concerned her.  She denies any dyspnea.  Denies any chest pressure at this moment.  She denies any nausea or dyspnea or any diaphoresis.    History reviewed.   Past Medical History:    Cataract    Cervical vertebral fusion    Disorder of thyroid    Essential hypertension    High blood pressure    Hyperlipidemia    Hyperthyroidism    Incontinence    Positive FIT (fecal immunochemical test)    05/10/22 EGD w/cold biopsy & Colonoscopy:  Normal EGD, internal hemorrhoids, tortuous colon    Screen for colon cancer    no polyps noted on c-scope    Visual impairment    Readers       History reviewed.   Past Surgical History:   Procedure Laterality Date    Back surgery      Carpal tunnel release Left     Cataract Left 3/16/17    Cataract Right 06/2017    Colonoscopy N/A 5/10/2022    Procedure: COLONOSCOPY;  Surgeon: CRISS Fonseca MD;  Location: [facility] ENDOSCOPY    Knee surgery Left 1982    Other surgical history      Bladder nodules removed    Other surgical history      Thyroid nodule removed         Medications :  (Not in a hospital admission)       Family History   Problem Relation Age of Onset    Cancer Father     Heart Disorder Mother         Stroke    Cancer Brother     Breast Cancer Maternal Aunt 60    Breast Cancer Maternal Cousin Female 70    Cancer Niece 32        pancreatic ca    Hypertension Sister     Hypertension Sister     Other (Other) Sister         TIM    Diabetes Neg        Smoking Status:   Social History     Socioeconomic History    Marital status:    Tobacco Use    Smoking status: Former     Current packs/day: 0.50     Types:  Cigarettes    Smokeless tobacco: Never   Vaping Use    Vaping status: Never Used   Substance and Sexual Activity    Alcohol use: Yes     Alcohol/week: 1.0 standard drink of alcohol     Types: 1 Glasses of wine per week     Comment: ocassionally    Drug use: Not Currently     Types: Cannabis     Comment: Quit when she was 29yrs old    Sexual activity: Not Currently   Other Topics Concern    Reaction to local anesthetic No       Constitutional and vital signs reviewed.      Social History and Family History elements reviewed from today, pertinent positives to the presenting problem noted.    Physical Exam     ED Triage Vitals [05/15/24 1112]   /74   Pulse 68   Resp 18   Temp 98.6 °F (37 °C)   Temp src    SpO2 98 %   O2 Device None (Room air)       All measures to prevent infection transmission during my interaction with the patient were taken. The patient was already wearing a droplet mask on my arrival to the room. Personal protective equipment was worn throughout the duration of the exam.  Handwashing was performed prior to and after the exam.  Stethoscope and any equipment used during my examination was cleaned with super sani-cloth germicidal wipes following the exam.     Physical Exam    General: NAD  Head: Normocephalic and atraumatic.  Mouth/Throat/Ears/Nose: Oropharynx is clear and moist.   Eyes: Conjunctivae and EOM are normal.   Neck: Normal range of motion. Supple.   Cardiovascular: Normal rate, regular rhythm, normal heart sounds.  Respiratory/Chest: Clear and equal bilaterally. Exhibits no tenderness.  Gastrointestinal: Soft, non-tender, non-distended. Bowel sounds are normal.   Musculoskeletal:No swelling or deformity.   Neurological: Alert and appropriate. No focal deficits.   Skin: Skin is warm and dry. No pallor.  Psychiatric: Has a normal mood and affect.      ED Course        Labs Reviewed   COMP METABOLIC PANEL (14) - Abnormal; Notable for the following components:       Result Value     Bilirubin, Total 1.3 (*)     All other components within normal limits   D-DIMER - Abnormal; Notable for the following components:    D-Dimer 1.40 (*)     All other components within normal limits   CBC W/ DIFFERENTIAL - Abnormal; Notable for the following components:    RBC 3.67 (*)     HGB 11.1 (*)     RDW-SD 46.8 (*)     All other components within normal limits   TROPONIN I HIGH SENSITIVITY - Normal   TROPONIN I HIGH SENSITIVITY - Normal   CBC WITH DIFFERENTIAL WITH PLATELET    Narrative:     The following orders were created for panel order CBC With Differential With Platelet.                  Procedure                               Abnormality         Status                                     ---------                               -----------         ------                                     CBC W/ DIFFERENTIAL[[id number]]          Abnormal            Final result                                                 Please view results for these tests on the individual orders.   RAINBOW DRAW LAVENDER   RAINBOW DRAW LIGHT GREEN   RAINBOW DRAW BLUE     EKG    Rate, intervals and axes as noted on EKG Report.  Rate: 66  Rhythm: Sinus Rhythm  Reading: No STEMI.  This is my interpretation.    Repeat EKG at 2:50 PM interpretation by me: Normal sinus rhythm at rate 63, normal axis, normal intervals, no STEMI.  This is my interpretation.  No significant change when compared to the earlier EKG today.         As Interpreted by me    Imaging Results Available and Reviewed while in ED: CT CHEST PE AORTA (IV ONLY) (CPT=71260)    Result Date: 5/15/2024  CONCLUSION: No PE or acute aortic syndrome    Dictated by (CST): Nate Zamora MD on 5/15/2024 at 2:13 PM     Finalized by (CST): Nate Zamora MD on 5/15/2024 at 2:17 PM          XR CHEST AP PORTABLE  (CPT=71045)    Result Date: 5/15/2024  PROCEDURE: XR CHEST AP PORTABLE  (CPT=71045) TIME: 1236  COMPARISON: None.  INDICATIONS: Chest pain for one day  TECHNIQUE:   Single view.    Findings and impression:  Heart is magnified by technique, likely normal  Clear lungs  Normal pleura     Dictated by (CST): Nate Zamora MD on 5/15/2024 at 12:51 PM     Finalized by (CST): Nate Zamora MD on 5/15/2024 at 12:51 PM         ED Medications Administered:   Medications   iopamidol 76% (ISOVUE-370) injection for power injector (65 mL Intravenous Given 5/15/24 1351)         MDM     Vitals:    05/15/24 1112 05/15/24 1600   BP: 147/74 139/89   Pulse: 68 56   Resp: 18 13   Temp: 98.6 °F (37 °C)    SpO2: 98% 97%   Weight: 59.9 kg    Height: 162.6 cm (5' 4\")      *I personally reviewed and interpreted all ED vitals.    Pulse Ox: 98%, Room air, Normal     Monitor Interpretation:   normal sinus rhythm as interpreted by me.  The cardiac monitor was ordered given chest pain.      Medical Decision Making      Differential Diagnosis/ Diagnostic Considerations: ACS, pulmonary embolus, GERD    Complicating Factors: The patient already has hypertension to contribute to the complexity of this ED evaluation.    I reviewed prior chart records including office visit note from May 2, 2024.  Labs reviewed and unremarkable for acute findings, serial troponins obtained and are negative on my interpretation.  Her D-dimer test is elevated, CT PE scan was obtained and unremarkable for PE.  Chest x-ray unremarkable for acute findings on my interpretation.  Discharged home in stable condition.  She was advised to follow-up closely with cardiology and PCP in the outpatient setting for further risk stratification of her chest pain..    Disposition and Plan     Clinical Impression:  1. Chest pain of uncertain etiology        Disposition:  Discharge    Follow-up:  Nila Mason MD  [address] [id number] [phone]    Schedule an appointment as soon as possible for a visit in 1 day(s)      Herson Murphy MD  [address]  [facility] [id number] [phone]    Schedule an appointment as soon as possible for a  visit in 1 day(s)        Medications Prescribed:  Discharge Medication List as of 5/15/2024  4:10 PM

## 2024-05-15 NOTE — ED INITIAL ASSESSMENT (HPI)
Pt presents to the ED with c/o mid chest pain since this morning. +pressure like. Denies sob/dizziness/lightheadedness. Pt reports similar pain last week. History of arrhythmias, on diltiazem.

## 2024-05-17 ENCOUNTER — NURSE TRIAGE (OUTPATIENT)
Dept: INTERNAL MEDICINE CLINIC | Facility: CLINIC | Age: 79
End: 2024-05-17

## 2024-05-17 DIAGNOSIS — E03.9 ACQUIRED HYPOTHYROIDISM: ICD-10-CM

## 2024-05-17 DIAGNOSIS — I10 ESSENTIAL HYPERTENSION: ICD-10-CM

## 2024-05-17 RX ORDER — LEVOTHYROXINE SODIUM 0.12 MG/1
125 TABLET ORAL
Qty: 48 TABLET | Refills: 3
Start: 2024-05-18

## 2024-05-17 RX ORDER — LEVOTHYROXINE SODIUM 0.12 MG/1
125 TABLET ORAL
Qty: 4 TABLET | Refills: 0 | OUTPATIENT
Start: 2024-05-18

## 2024-05-17 RX ORDER — LEVOTHYROXINE SODIUM 0.12 MG/1
125 TABLET ORAL
Qty: 4 TABLET | Refills: 0 | Status: CANCELLED
Start: 2024-05-18

## 2024-05-17 RX ORDER — DILTIAZEM HYDROCHLORIDE 120 MG/1
120 TABLET, FILM COATED ORAL 4 TIMES DAILY
Qty: 360 TABLET | Refills: 0 | Status: CANCELLED | OUTPATIENT
Start: 2024-05-17

## 2024-05-17 RX ORDER — LEVOTHYROXINE SODIUM 137 UG/1
137 TABLET ORAL
Qty: 3 TABLET | Refills: 0 | Status: CANCELLED
Start: 2024-05-17

## 2024-05-17 RX ORDER — LEVOTHYROXINE SODIUM 137 UG/1
137 TABLET ORAL
Qty: 3 TABLET | Refills: 0 | OUTPATIENT
Start: 2024-05-17

## 2024-05-17 RX ORDER — HYDROCHLOROTHIAZIDE 12.5 MG/1
CAPSULE, GELATIN COATED ORAL
Qty: 30 CAPSULE | Refills: 0 | Status: CANCELLED | OUTPATIENT
Start: 2024-05-17

## 2024-05-17 RX ORDER — LEVOTHYROXINE SODIUM 137 UG/1
137 TABLET ORAL
Qty: 36 TABLET | Refills: 3
Start: 2024-05-17

## 2024-05-17 NOTE — TELEPHONE ENCOUNTER
I have never seen her, we can forward this to her endocrinologist or surgical At[place] by  Katarzyna Garrido -- all who have seen her

## 2024-05-17 NOTE — TELEPHONE ENCOUNTER
Please review. Rx failed/no protocol.    Dr. Burroughs, please see previous telephone encounters. Patient called and clarified how she is taking her levothyroxine    Pended for the [facility] pharmacy  Levothyroxine 125 mcg: pended #4 x 7 days (until her mal order arrives)  Levothyroxine 137 mcg: pended for #3 x 7 days (until her mail order arrives)    Pended for mail order:  Levothyroxine 125 mcg: pended #48 x 1 year  Levothyroxine 137 mcg: pended #36 x 1 year    For questions:  Please reply to pool: EM RN TRIAGE    Requested Prescriptions   Pending Prescriptions Disp Refills    levothyroxine 137 MCG Oral Tab 36 tablet 3     Sig: Take 137 mcg by mouth 3 (three) times a week. TAKE IN THE MORNING ON MONDAYS, WEDNESDAYS, AND FRIDAYS       Thyroid Medication Protocol Failed - 5/17/2024  2:41 PM        Failed - Last TSH value is normal     Lab Results   Component Value Date    TSH 3.790 (H) 10/24/2023                 Passed - TSH in past 12 months        Passed - In person appointment or virtual visit in the past 12 mos or appointment in next 3 mos     Recent Outpatient Visits              2 weeks ago Chest pain, unspecified type    [facility] Lizy Burroughs MD    Office Visit    2 months ago Localized edema    [facility] Orlin Lucia MD    Office Visit    2 months ago [place] of toe    [facility] Julia Liz DPM    Office Visit    2 months ago Age-related osteoporosis without current pathological fracture    [facility] [place] Ruby Hunt MD    Office Visit    2 months ago Essential hypertension    [facility] Katarzyna Garrido APRN    Office Visit          Future Appointments         Provider Department Appt Notes    In 5 days Ruby Hunt MD [facility],  [facility] 3 months                      levothyroxine 125 MCG Oral Tab 48 tablet 3     Sig: Take 1 tablet (125 mcg total) by mouth 4 (four) times a week. TAKE 1 TABLET IN THE MORNING ON TUESDAYS, THURSDAYS, SATURDAYS AND SUNDAYS.       Thyroid Medication Protocol Failed - 5/17/2024  2:41 PM        Failed - Last TSH value is normal     Lab Results   Component Value Date    TSH 3.790 (H) 10/24/2023                 Passed - TSH in past 12 months        Passed - In person appointment or virtual visit in the past 12 mos or appointment in next 3 mos     Recent Outpatient Visits              2 weeks ago Chest pain, unspecified type    [facility] Lizy Burroughs MD    Office Visit    2 months ago Localized edema    [facility] Orlin Lucia MD    Office Visit    2 months ago [place] of toe    [place] [place] Julia Liz DPM    Office Visit    2 months ago Age-related osteoporosis without current pathological fracture    [facility] Ruby Hunt MD    Office Visit    2 months ago Essential hypertension    [facility] Katarzyna Garrido APRN    Office Visit          Future Appointments         Provider Department Appt Notes    In 5 days Ruby Hunt MD [facility] 3 months                      levothyroxine 125 MCG Oral Tab 4 tablet 0     Sig: Take 1 tablet (125 mcg total) by mouth 4 (four) times a week. TAKE 1 TABLET IN THE MORNING ON TUESDAYS, THURSDAYS, SATURDAYS AND SUNDAYS.       Thyroid Medication Protocol Failed - 5/17/2024  2:41 PM        Failed - Last TSH value is normal     Lab Results   Component Value Date    TSH 3.790 (H) 10/24/2023                 Passed - TSH in past 12 months        Passed - In person  appointment or virtual visit in the past 12 mos or appointment in next 3 mos     Recent Outpatient Visits              2 weeks ago Chest pain, unspecified type    [facility] Lizy Burroughs MD    Office Visit    2 months ago [facility] edema    [facility]    [facility]    2 months ago [place] [facility]    Office Visit    2 months ago Age-related osteoporosis without current pathological fracture    [facility] Ruby Hunt MD    Office Visit    2 months ago Essential hypertension    [facility] Katarzyna Garrido APRN    Office Visit          Future Appointments         Provider Department Appt Notes    In 5 days Ruby Hunt MD [facility] 3 months                      levothyroxine 137 MCG Oral Tab 3 tablet 0     Sig: Take 137 mcg by mouth 3 (three) times a week. TAKE IN THE MORNING ON MONDAYS, WEDNESDAYS, AND FRIDAYS       Thyroid Medication Protocol Failed - 5/17/2024  2:41 PM        Failed - Last TSH value is normal     Lab Results   Component Value Date    TSH 3.790 (H) 10/24/2023                 Passed - TSH in past 12 months        Passed - In person appointment or virtual visit in the past 12 mos or appointment in next 3 mos     Recent Outpatient Visits              2 weeks ago [facility] pain, unspecified type    [facility]    Office Visit    2 months ago [facility]ized edema    [facility]    Office Visit    2 months ago [place] [facility] toe    [facility]    Office Visit    2 months ago  Age-related osteoporosis without current pathological fracture    [facility] Ruby Hunt MD    Office Visit    2 months ago Essential hypertension    [facility] Katarzyna Garrido APRN    Office Visit          Future Appointments         Provider Department Appt Notes    In 5 days Ruby Hunt MD [facility] 3 months                     Refused Prescriptions Disp Refills    levothyroxine 125 MCG Oral Tab 4 tablet 0     Sig: Take 1 tablet (125 mcg total) by mouth 4 (four) times a week. TAKE EVERY TUESDAYS,THURSDAYS,SATURDAYS AND SUNDAYS       Thyroid Medication Protocol Failed - 5/17/2024  2:41 PM        Failed - Last TSH value is normal     Lab Results   Component Value Date    TSH 3.790 (H) 10/24/2023                 Passed - TSH in past 12 months        Passed - In person appointment or virtual visit in the past 12 mos or appointment in next 3 mos     Recent Outpatient Visits              2 weeks ago Chest pain, unspecified type    [facility] Lizy Burroughs MD    Office Visit    2 months ago Localized edema    [facility] Orlin Lucia MD    Office Visit    2 months ago [place] of toe    [facility] Julia Liz DPM    Office Visit    2 months ago Age-related osteoporosis without current pathological fracture    [facility] Ruby Hunt MD    Office Visit    2 months ago Essential hypertension    [facility] Katarzyna Garrido APRN    Office Visit          Future Appointments         Provider Department Appt Notes    In 5 days Ruby Hunt MD [facility] 3 months                       levothyroxine 137 MCG Oral Tab 3 tablet 0     Sig: Take 137 mcg by mouth 3 (three) times a week. Take 137 mcg on MONDAYS,WEDNESDAYS AND FRIDAYS       Thyroid Medication Protocol Failed - 5/17/2024  2:41 PM        Failed - Last TSH value is normal     Lab Results   Component Value Date    TSH 3.790 (H) 10/24/2023                 Passed - TSH in past 12 months        Passed - In person appointment or virtual visit in the past 12 mos or appointment in next 3 mos     Recent Outpatient Visits              2 weeks ago Chest pain, unspecified type    [facility] Lizy Burroughs MD    Office Visit    2 months ago Localized edema    [facility] Orlin Lucia MD    Office Visit    2 months ago [place] of toe    [facility] Julia Liz DPM    Office Visit    2 months ago Age-related osteoporosis without current pathological fracture    [facility] Ruby Hunt MD    Office Visit    2 months ago Essential hypertension    [facility] Katarzyna Garrido APRN    Office Visit          Future Appointments         Provider Department Appt Notes    In 5 days Ruby Hunt MD [facility] 3 months                      levothyroxine 125 MCG Oral Tab 48 tablet 3     Sig: Take 1 tablet (125 mcg total) by mouth 4 (four) times a week. TAKE EVERY TUESDAYS,THURSDAYS,SATURDAYS AND SUNDAYS       Thyroid Medication Protocol Failed - 5/17/2024  2:41 PM        Failed - Last TSH value is normal     Lab Results   Component Value Date    TSH 3.790 (H) 10/24/2023                 Passed - TSH in past 12 months        Passed - In person appointment or virtual visit in the past 12 mos or appointment in next 3 mos     Recent Outpatient Visits               2 weeks ago Chest pain, unspecified type    [facility] Lizy Burroughs MD    Office Visit    2 months ago Localized edema    [facility]    [facility] Visit    2 months ago [place] [facility] [place]    [facility]    Office Visit    2 months ago Age-related osteoporosis without current pathological fracture    [facility] Ruby Hunt MD    Office Visit    2 months ago Essential hypertension    [facility] Katarzyna Garrido APRN    Office Visit          Future Appointments         Provider Department Appt Notes    In 5 days Ruby Hunt MD [facility] 3 months                      levothyroxine 137 MCG Oral Tab 36 tablet 3     Sig: Take 137 mcg by mouth 3 (three) times a week. MONDAYS,WEDNESDAYS AND FRIDAYS       Thyroid Medication Protocol Failed - 5/17/2024  2:41 PM        Failed - Last TSH value is normal     Lab Results   Component Value Date    TSH 3.790 (H) 10/24/2023                 Passed - TSH in past 12 months        Passed - In person appointment or virtual visit in the past 12 mos or appointment in next 3 mos     Recent Outpatient Visits              2 weeks ago Chest pain, unspecified type    [facility]    Office Visit    2 months ago Localized edema    [facility]    Office Visit    2 months ago [place] [facility] [place]    [facility]    Office Visit    2 months ago Age-related osteoporosis without current pathological fracture    [facility]    Office Visit    2 months ago Essential hypertension    [facility] Katarzyna Garrido APRN    Office Visit          Future Appointments         Provider Department Appt Notes    In 5 days Ruby Hunt MD [facility] 3 months                         Future Appointments         Provider Department Appt Notes    In 5 days Ruby Hunt MD [facility] 3 months          Recent Outpatient Visits              2 weeks ago Chest pain, unspecified type    [facility] Lizy Burroughs MD    Office Visit    2 months ago Localized edema    [facility] Orlin Lucia MD    Office Visit    2 months ago [place] of toe    [facility] Julia Liz DPM    Office Visit    2 months ago Age-related osteoporosis without current pathological fracture    [facility] Ruby Hunt MD    Office Visit    2 months ago Essential hypertension    [facility] Katarzyna Garrido APRN    Office Visit

## 2024-05-17 NOTE — TELEPHONE ENCOUNTER
Patient needs a refill on:      omeprazole 20 MG Oral Capsule Delayed Release 90 capsule 3 3/22/2024 --   Sig:   Take 1 capsule (20 mg total) by mouth before breakfast.     Route:   Oral       [facility] - [place], [place] - [address] [phone], [phone]

## 2024-05-17 NOTE — TELEPHONE ENCOUNTER
Please review. Rx failed/no protocol.    Please refer to telephone encounter below by Rose CORLEY registered nurse    7 day supply pended for Deyanira  1 year for Michelet    Requested Prescriptions   Pending Prescriptions Disp Refills    levothyroxine 125 MCG Oral Tab 48 tablet 3     Sig: Take 1 tablet (125 mcg total) by mouth 4 (four) times a week. TAKE EVERY TUESDAYS,THURSDAYS,SATURDAYS AND SUNDAYS       Thyroid Medication Protocol Failed - 5/17/2024  2:18 PM        Failed - Last TSH value is normal     Lab Results   Component Value Date    TSH 3.790 (H) 10/24/2023                 Passed - TSH in past 12 months        Passed - In person appointment or virtual visit in the past 12 mos or appointment in next 3 mos     Recent Outpatient Visits              2 weeks ago Chest pain, unspecified type    [facility] Lizy Burroughs MD    Office Visit    2 months ago Localized edema    [facility] Orlin Lucia MD    Office Visit    2 months ago [place] of toe    [facility] Julia Liz DPM    Office Visit    2 months ago Age-related osteoporosis without current pathological fracture    [facility] Ruby Hunt MD    Office Visit    2 months ago Essential hypertension    [facility] Katarzyna Garrido APRN    Office Visit          Future Appointments         Provider Department Appt Notes    In 5 days Ruby Hunt MD [facility] 3 months                      levothyroxine 137 MCG Oral Tab 36 tablet 3     Sig: Take 137 mcg by mouth 3 (three) times a week. Take 137 mcg on MONDAYS,WEDNESDAYS AND FRIDAYS       Thyroid Medication Protocol Failed - 5/17/2024  2:18 PM        Failed - Last TSH value is normal     Lab Results    Component Value Date    TSH 3.790 (H) 10/24/2023                 Passed - TSH in past 12 months        Passed - In person appointment or virtual visit in the past 12 mos or appointment in next 3 mos     Recent Outpatient Visits              2 weeks ago Chest pain, unspecified type    [facility] Lizy Burroughs MD    Office Visit    2 months ago [facility]ized edema    [facility]    [facility] Visit    2 months ago [place] [facility] toe    [facility]    Office Visit    2 months ago Age-related osteoporosis without current pathological fracture    [facility] Ruby Hunt MD    Office Visit    2 months ago Essential hypertension    [facility] Katarzyna Garrido APRN    Office Visit          Future Appointments         Provider Department Appt Notes    In 5 days Ruby Hunt MD [facility] 3 months                         Future Appointments         Provider Department Appt Notes    In 5 days Ruby Hunt MD [facility] 3 months          Recent Outpatient Visits              2 weeks ago Chest pain, unspecified type    [facility]    Office Visit    2 months ago Localized edema    [facility]    Office Visit    2 months ago [place] of toe    [facility]    Office Visit    2 months ago Age-related osteoporosis without current pathological fracture    [facility] Ruby Hunt MD    Office Visit    2 months ago  Essential hypertension    [facility]    Office Visit

## 2024-05-17 NOTE — TELEPHONE ENCOUNTER
Katarzyna Garrido==please disregard .       Patient called and would like to verify if she needs to take the medications at home.   Patient went to Emergency room on 5/15/2024 due to chest pain.   RN instructed the patient to get the medication bottles  ad will compare it to our list .       Medication record is up to date except for 4 medications;  1.levothyroxine 125 MCG Oral Tab ===TAKE 1 TABLET IN THE MORNING ON MONDAYS, WEDNESDAYS, AND FRIDAYS. TAKE  MCG DOSE ON ALL OTHER DAYS. =   PRESCRIBED  BY DR NOLAN ====NEEDS CORRECT INSTRUCTION AND REFILLS.      ===per patient she is taking BOTH levothyroxine 125 mcg and 137 mcg every MONDAYS, WEDNESDAYS, AND FRIDAYS per bottle instruction and she is NOT taking anything on Tuesday,Thursday., Saturday and Sunday .       2.levothyroxine 137 MCG Oral Tab =====Take 137 mcg on Monday, Wed, Fridays.  Take 125 mcg all other days. ====PRESCRIBED BY DR NOLAN ===NEEDS CORRECT INSTRUCTION AND REFILL.     ===per patient she is taking BOTH levothyroxine 125 mcg and 137 mcg every MONDAYS, WEDNESDAYS, AND FRIDAYS per bottle instruction and she is NOT taking anything on Tuesday,Thursday., Saturday and Sunday .         3. dilTIAZem HCl 120 MG Oral Tab ===Take 1 tablet (120 mg total) by mouth 4 (four) times daily.   LISTED AS HISTORICAL.====NEEDS REFILL ==see refill encounter 5/17/24.    4. hydroCHLOROthiazide 12.5 MG Oral Cap === Take 1 capsule (12.5 mg total) by mouth every morning. Take 2 times a week as needed for leg swelling. LISTED AS HISTORICAL ====NEEDS REFILL ==see refill encounter 5/17/24          Instructed patient NOT TO TAKE the levothyroxine and we will clarify the correct dose first . She does  not have any diltiazem and hydrochlorothiazide anymore .         5/2/24 Dr Salinas' office visit note;  ASSESSMENT AND PLAN:   1. Chest pain, unspecified type  2. Essential hypertension  3. Atrial tachycardia (HCC)  4. Cerebrovascular disease  5. Hyperlipidemia,  unspecified hyperlipidemia type  -chest pain, on-off, with persistant dizziness x 5 days  -no syncope  -no GI, , or URI symptoms  Plan:  -patient needs to go to ER to r/o cardio/neuro disease  -patient understands but frustrated, declined ambulance and wants to go  her grandson to go together to the ER, verbalized understanding risks.     6. Acquired hypothyroidism  -TSH 3.7 10/2023  -takes levothyroxine 125 mcg MWF, and 137 mcg on the other days.      7. Essential hypertension  -been experiencing increased urinartion frequency  -/64  Plan:  -stopped hydrochlorothiazide but swelling in the legs increased, resumed by Katarzyna Hemphill as needed for leg swelling  -continue diltiazem   -return to office in 2 weeks     3. Gastroesophageal reflux disease, unspecified whether esophagitis present  -stable on omeprazole

## 2024-05-17 NOTE — TELEPHONE ENCOUNTER
Dr Lucia , Dr Burroughs and Katarzyna Garrido====please disregard .     UPDATES :  RN called [facility] pharmacy and per Nelli pharmacist , the levothyroxine 125 mcg was dispensed for 39 tablets only  and the levothyroxine 137 mcg was dispensed for 90 tablets .   BOTH 2 doses of Levothyroxine were sent to them on different dates with the same instructions , they  did not catch the instruction discrepancy .       RN called the patient again, it takes time for the patient to clarify the levothyroxine dose instruction.   At first, she keeps insisting  she takes BOTH doses at the same time every Mondays, Wednesdays and Fridays.  She reported that the last time she took it was yesterday and nothing today .   She is the one taking care of her medications.   She run out of levothyroxine 125 mcg .       RN re clarified the medication again, one by one.   Patient now saying that she takes the levothyroxine everyday but with different doses.   She takes the levothyroxine 137 mcg every Mondays, Wednesdays and Fridays.   She takes the levothyroxine 125 mcg every Tuesdays,Thursdays, Saturdays and Sundays.   She took her last pill of Levothyroxine 125 mcg yesterday and she  needs refill.   Can send the short supply to [facility] in [facility].   Then the rest to [facility] pharmacy .       RN PENDED THE MEDICATIONS WITH THE correct instructions.  Encounter now changed to refill.  Sent as a high priority .

## 2024-05-20 ENCOUNTER — PATIENT OUTREACH (OUTPATIENT)
Dept: CASE MANAGEMENT | Age: 79
End: 2024-05-20

## 2024-05-20 ENCOUNTER — LAB ENCOUNTER (OUTPATIENT)
Dept: LAB | Facility: HOSPITAL | Age: 79
End: 2024-05-20
Attending: INTERNAL MEDICINE

## 2024-05-20 ENCOUNTER — TELEPHONE (OUTPATIENT)
Dept: INTERNAL MEDICINE CLINIC | Facility: CLINIC | Age: 79
End: 2024-05-20

## 2024-05-20 DIAGNOSIS — E03.9 ACQUIRED HYPOTHYROIDISM: ICD-10-CM

## 2024-05-20 DIAGNOSIS — M81.0 AGE-RELATED OSTEOPOROSIS WITHOUT CURRENT PATHOLOGICAL FRACTURE: ICD-10-CM

## 2024-05-20 DIAGNOSIS — E03.9 ACQUIRED HYPOTHYROIDISM: Primary | ICD-10-CM

## 2024-05-20 DIAGNOSIS — E55.9 VITAMIN D DEFICIENCY: ICD-10-CM

## 2024-05-20 LAB
ALBUMIN SERPL-MCNC: 4.5 G/DL (ref 3.2–4.8)
ALBUMIN/GLOB SERPL: 1.3 {RATIO} (ref 1–2)
ALP LIVER SERPL-CCNC: 56 U/L
ALT SERPL-CCNC: 15 U/L
ANION GAP SERPL CALC-SCNC: 4 MMOL/L (ref 0–18)
AST SERPL-CCNC: 16 U/L (ref ?–34)
BILIRUB SERPL-MCNC: 1.6 MG/DL (ref 0.2–1.1)
BUN BLD-MCNC: 11 MG/DL (ref 9–23)
BUN/CREAT SERPL: 14.5 (ref 10–20)
CALCIUM BLD-MCNC: 9.6 MG/DL (ref 8.7–10.4)
CHLORIDE SERPL-SCNC: 106 MMOL/L (ref 98–112)
CO2 SERPL-SCNC: 32 MMOL/L (ref 21–32)
CREAT BLD-MCNC: 0.76 MG/DL
EGFRCR SERPLBLD CKD-EPI 2021: 80 ML/MIN/1.73M2 (ref 60–?)
FASTING STATUS PATIENT QL REPORTED: YES
GLOBULIN PLAS-MCNC: 3.4 G/DL (ref 2–3.5)
GLUCOSE BLD-MCNC: 82 MG/DL (ref 70–99)
OSMOLALITY SERPL CALC.SUM OF ELEC: 292 MOSM/KG (ref 275–295)
POTASSIUM SERPL-SCNC: 4.1 MMOL/L (ref 3.5–5.1)
PROT SERPL-MCNC: 7.9 G/DL (ref 5.7–8.2)
PTH-INTACT SERPL-MCNC: 86.8 PG/ML (ref 18.5–88)
SODIUM SERPL-SCNC: 142 MMOL/L (ref 136–145)
T3FREE SERPL-MCNC: 3.11 PG/ML (ref 2.4–4.2)
T4 FREE SERPL-MCNC: 1.4 NG/DL (ref 0.8–1.7)
TSI SER-ACNC: 0.13 MIU/ML (ref 0.55–4.78)
VIT D+METAB SERPL-MCNC: 39.8 NG/ML (ref 30–100)

## 2024-05-20 PROCEDURE — 36415 COLL VENOUS BLD VENIPUNCTURE: CPT

## 2024-05-20 PROCEDURE — 80053 COMPREHEN METABOLIC PANEL: CPT

## 2024-05-20 PROCEDURE — 83970 ASSAY OF PARATHORMONE: CPT

## 2024-05-20 PROCEDURE — 84080 ASSAY ALKALINE PHOSPHATASES: CPT

## 2024-05-20 PROCEDURE — 84481 FREE ASSAY (FT-3): CPT

## 2024-05-20 PROCEDURE — 84439 ASSAY OF FREE THYROXINE: CPT

## 2024-05-20 PROCEDURE — 82306 VITAMIN D 25 HYDROXY: CPT

## 2024-05-20 PROCEDURE — 84443 ASSAY THYROID STIM HORMONE: CPT

## 2024-05-20 RX ORDER — LEVOTHYROXINE SODIUM 0.12 MG/1
125 TABLET ORAL
Qty: 48 TABLET | Refills: 3 | Status: CANCELLED
Start: 2024-05-21

## 2024-05-20 RX ORDER — LEVOTHYROXINE SODIUM 137 UG/1
137 TABLET ORAL
Qty: 36 TABLET | Refills: 3 | Status: CANCELLED
Start: 2024-05-20

## 2024-05-20 RX ORDER — OMEPRAZOLE 20 MG/1
20 CAPSULE, DELAYED RELEASE ORAL
Qty: 90 CAPSULE | Refills: 3 | OUTPATIENT
Start: 2024-05-20

## 2024-05-20 NOTE — TELEPHONE ENCOUNTER
Patient's TSH was a little elevated last visit. Need another blood work, it's been >6 months.   Depending on the labs, we can do the refill. Order is placed, please inform patient.

## 2024-05-20 NOTE — TELEPHONE ENCOUNTER
Spoke with patient regarding provider's message below.   Patient verbalized understanding and will get blood work done.

## 2024-05-20 NOTE — TELEPHONE ENCOUNTER
Update, was able to add it on labs.     Patient's TSH was a little elevated last visit. Need another blood work, it's been >6 months.   Depending on the labs, we can do the refill. Order is placed, please inform patient.

## 2024-05-20 NOTE — TELEPHONE ENCOUNTER
Spoke to patient to let her know she would need to return to lab. She is at the lab now. No further concerns.

## 2024-05-20 NOTE — TELEPHONE ENCOUNTER
Megan from lab states they are unable to add on a TSH , blood has already been discarded. The order was cancelled.  New order pended. Please advise.

## 2024-05-20 NOTE — PROGRESS NOTES
1st attempt ER f/up apt request  [facility] -existing apt (5/20)    Katarzyna Garrido APRN  PCP  [address] [place] [id number]  [phone]  Apt: May 22 @ 9:20am     PT NOT ANSWERING PHONE TO GIVE APT INFO  VM FULL, PER VERBAL RELEASE, DO NOT LVM    Called pt son to give apt info, pt son wrote down info to give to pt  Closing encounter

## 2024-05-21 DIAGNOSIS — E03.9 ACQUIRED HYPOTHYROIDISM: ICD-10-CM

## 2024-05-21 RX ORDER — HYDROCHLOROTHIAZIDE 12.5 MG/1
CAPSULE, GELATIN COATED ORAL
Qty: 90 CAPSULE | Refills: 0 | Status: SHIPPED | OUTPATIENT
Start: 2024-05-21

## 2024-05-21 RX ORDER — LEVOTHYROXINE SODIUM 0.12 MG/1
TABLET ORAL
Qty: 39 TABLET | Refills: 0 | OUTPATIENT
Start: 2024-05-21

## 2024-05-21 RX ORDER — DILTIAZEM HYDROCHLORIDE 120 MG/1
120 TABLET, FILM COATED ORAL 4 TIMES DAILY
Qty: 360 TABLET | Refills: 0 | Status: SHIPPED | OUTPATIENT
Start: 2024-05-21

## 2024-05-21 NOTE — TELEPHONE ENCOUNTER
Refill team asked that RN confirm with patient how she is taking medication as she is on two different doses of Levothyroxine.     Rn called her three times and phone disconnects as soon as she says gracie. Rn called back again- went to voicemail but it is full.

## 2024-05-21 NOTE — TELEPHONE ENCOUNTER
Rn spoke to patient to deliver test results. She is in need of a refill for her Levothyroxine 125 mcg dose. She is out of this dose of medication.     Routing to refill team.     ----- Message from Lizy Burroughs sent at 5/21/2024  8:38 AM CDT -----  Please call patient with her abnormal test results. Abnormal TSH. Please review proper way of taking her medications and return to the office in 6 weeks, repeat labs the day before her appointment.   I will Rx her current dose.     Thank you.

## 2024-05-21 NOTE — TELEPHONE ENCOUNTER
Refill passed per [facility] protocol.  However, medications listed under patient reported section. Please review refill requests.    Requested Prescriptions   Pending Prescriptions Disp Refills    hydroCHLOROthiazide 12.5 MG Oral Cap 90 capsule 0     Sig: Take 2 times a week as needed for leg swelling.       Hypertension Medications Protocol Passed - 5/17/2024  1:40 PM        Passed - CMP or BMP in past 12 months        Passed - Last BP reading less than 140/90     BP Readings from Last 1 Encounters:   05/15/24 139/89               Passed - In person appointment or virtual visit in the past 12 mos or appointment in next 3 mos     Recent Outpatient Visits              2 weeks ago Chest pain, unspecified type    [facility] Lizy Burroughs MD    Office Visit    2 months ago Localized edema    [facility] Orlin Lucia MD    Office Visit    2 months ago [place] of toe    [facility] Julia Liz DPM    Office Visit    2 months ago Age-related osteoporosis without current pathological fracture    [facility] Ruby Hunt MD    Office Visit    3 months ago Essential hypertension    [facility] Katarzyna Garrido APRN    Office Visit          Future Appointments         Provider Department Appt Notes    In 2 days Katarzyna Garrido APRN [facility] 1 week ER follow up    In 2 days Ruby Hunt MD [facility] 3 months                    Passed - EGFRCR or GFRAA > 50     GFR Evaluation  EGFRCR: 80 , resulted on 5/20/2024        Refill passed per [facility] protocol.      dilTIAZem HCl 120 MG Oral Tab 360 tablet 0     Sig: Take 1 tablet (120 mg total) by mouth 4 (four) times  daily.       Hypertension Medications Protocol Passed - 5/17/2024  1:40 PM        Passed - CMP or BMP in past 12 months        Passed - Last BP reading less than 140/90     BP Readings from Last 1 Encounters:   05/15/24 139/89               Passed - In person appointment or virtual visit in the past 12 mos or appointment in next 3 mos     Recent Outpatient Visits              2 weeks ago Chest pain, unspecified type    [facility] Lizy Burroughs MD    Office Visit    2 months ago Localized edema    [facility] Orlin Lucia MD    Office Visit    2 months ago [place] of toe    [facility] Julia Liz DPM    Office Visit    2 months ago Age-related osteoporosis without current pathological fracture    [facility] Ruby Hunt MD    Office Visit    3 months ago Essential hypertension    [facility] Katarzyna Garrido APRN    Office Visit          Future Appointments         Provider Department Appt Notes    In 2 days Katarzyna Garrido APRN [facility] 1 week ER follow up    In 2 days Ruby Hunt MD [facility] 3 months                    Passed - EGFRCR or GFRAA > 50     GFR Evaluation  EGFRCR: 80 , resulted on 5/20/2024

## 2024-05-21 NOTE — TELEPHONE ENCOUNTER
Patient calling us back, says she was disconnected.     She is not home right now but wants to look at her pill bottles.   She is taking 2 different doses although she is not sure what dose she takes M-W-Fr and not sure of dose taking all other days of the week.    Patient is going to call us back when she is home to confirm dose and directions so we can dose and refill accordingly.

## 2024-05-22 ENCOUNTER — TELEPHONE (OUTPATIENT)
Dept: INTERNAL MEDICINE CLINIC | Facility: CLINIC | Age: 79
End: 2024-05-22

## 2024-05-22 DIAGNOSIS — I10 ESSENTIAL HYPERTENSION: ICD-10-CM

## 2024-05-22 NOTE — TELEPHONE ENCOUNTER
Received fax from pharmacy requesting clarification     Received RX's for       hydroCHLOROthiazide 12.5 MG Oral Cap, Take 2 times a week as needed for leg swelling., Disp: 90 capsule, Rfl: 0      25 mg Tab dated 05/20/2024 by Dr Aguilera   12.5 mg Tab dated 05/21/2024 by Dr Burroughs    Please clarify current therapy

## 2024-05-23 ENCOUNTER — OFFICE VISIT (OUTPATIENT)
Dept: INTERNAL MEDICINE CLINIC | Facility: CLINIC | Age: 79
End: 2024-05-23

## 2024-05-23 ENCOUNTER — OFFICE VISIT (OUTPATIENT)
Dept: ENDOCRINOLOGY CLINIC | Facility: CLINIC | Age: 79
End: 2024-05-23

## 2024-05-23 VITALS
SYSTOLIC BLOOD PRESSURE: 134 MMHG | HEART RATE: 67 BPM | HEIGHT: 64 IN | DIASTOLIC BLOOD PRESSURE: 60 MMHG | BODY MASS INDEX: 21.68 KG/M2 | WEIGHT: 127 LBS

## 2024-05-23 VITALS
HEIGHT: 64 IN | BODY MASS INDEX: 21.51 KG/M2 | WEIGHT: 126 LBS | DIASTOLIC BLOOD PRESSURE: 72 MMHG | SYSTOLIC BLOOD PRESSURE: 125 MMHG | HEART RATE: 74 BPM | RESPIRATION RATE: 16 BRPM

## 2024-05-23 DIAGNOSIS — M81.0 AGE-RELATED OSTEOPOROSIS WITHOUT CURRENT PATHOLOGICAL FRACTURE: Primary | ICD-10-CM

## 2024-05-23 DIAGNOSIS — R07.9 CHEST PAIN, UNSPECIFIED TYPE: ICD-10-CM

## 2024-05-23 DIAGNOSIS — R07.81 RIB PAIN ON RIGHT SIDE: ICD-10-CM

## 2024-05-23 DIAGNOSIS — Z09 HOSPITAL DISCHARGE FOLLOW-UP: Primary | ICD-10-CM

## 2024-05-23 DIAGNOSIS — I47.19 ATRIAL TACHYCARDIA (HCC): ICD-10-CM

## 2024-05-23 DIAGNOSIS — R60.0 LOCALIZED EDEMA: ICD-10-CM

## 2024-05-23 DIAGNOSIS — R00.2 PALPITATIONS: ICD-10-CM

## 2024-05-23 DIAGNOSIS — E03.9 ACQUIRED HYPOTHYROIDISM: ICD-10-CM

## 2024-05-23 DIAGNOSIS — I10 ESSENTIAL HYPERTENSION: ICD-10-CM

## 2024-05-23 LAB — ALKALINE PHOSPHATASE BONE SPECIFIC: 10.1 UG/L

## 2024-05-23 PROCEDURE — 3008F BODY MASS INDEX DOCD: CPT | Performed by: INTERNAL MEDICINE

## 2024-05-23 PROCEDURE — 3075F SYST BP GE 130 - 139MM HG: CPT | Performed by: INTERNAL MEDICINE

## 2024-05-23 PROCEDURE — 99214 OFFICE O/P EST MOD 30 MIN: CPT | Performed by: NURSE PRACTITIONER

## 2024-05-23 PROCEDURE — 3078F DIAST BP <80 MM HG: CPT | Performed by: INTERNAL MEDICINE

## 2024-05-23 PROCEDURE — 1170F FXNL STATUS ASSESSED: CPT | Performed by: NURSE PRACTITIONER

## 2024-05-23 PROCEDURE — 3008F BODY MASS INDEX DOCD: CPT | Performed by: NURSE PRACTITIONER

## 2024-05-23 PROCEDURE — 1160F RVW MEDS BY RX/DR IN RCRD: CPT | Performed by: NURSE PRACTITIONER

## 2024-05-23 PROCEDURE — 1159F MED LIST DOCD IN RCRD: CPT | Performed by: NURSE PRACTITIONER

## 2024-05-23 PROCEDURE — 3078F DIAST BP <80 MM HG: CPT | Performed by: NURSE PRACTITIONER

## 2024-05-23 PROCEDURE — 3074F SYST BP LT 130 MM HG: CPT | Performed by: NURSE PRACTITIONER

## 2024-05-23 PROCEDURE — 99214 OFFICE O/P EST MOD 30 MIN: CPT | Performed by: INTERNAL MEDICINE

## 2024-05-23 PROCEDURE — 1159F MED LIST DOCD IN RCRD: CPT | Performed by: INTERNAL MEDICINE

## 2024-05-23 RX ORDER — LEVOTHYROXINE SODIUM 0.12 MG/1
125 TABLET ORAL
Qty: 4 TABLET | Refills: 0 | Status: CANCELLED | OUTPATIENT
Start: 2024-05-23

## 2024-05-23 NOTE — PROGRESS NOTES
Follow-up - Reason for Visit:  Osteoporosis.  Requesting Physician: Dr. Caitlyn Thomas.    CHIEF COMPLAINT:    Chief Complaint   Patient presents with    Osteoporosis     Follow up, last labs done 5/2024    Follow - Up     Vitamin D Deficiency     Hypothyroidism     Pt was in er on 16th  due to chest pain, however blood work reviewed it was her thyroid         HISTORY OF PRESENT ILLNESS:   Penny Roblero is a 78 year old female who presents with Osteoporosis for follow-up. At the last visit, we had decided that Reclast would be the best management for the patient's osteoporosis. She had one infusion of Reclast and then she has had a DEXA scan. She has been taking calcium and vitamin D, and has been exercising. She is taking care of her sister right now, who just had a stroke.     She had this on 3/22/21.  She has had no falls and fractures  She takes vitamin D and calcium  She is complaining of some elbow pain.    FRACTURE HISTORY  o Low trauma, atraumatic or high trauma (specifics regarding circumstances of fracture)  Yes- fractured ribs on the right side  o Fall-related fracture and circumstances of fall  Fell in the bathtub twice  o Prior history of fracture(s)   See above  o Site, age at fracture, interventions  Right three ribs  o Prior history of osteoporosis  None, just discovered in November 2020  o Prior history of osteoporosis treatment (medication, age or date used, duration of use, pre or postmenopausal ,when used, and reason for discontinuation)   Started Alendronate on 2/01/21  o Prolonged history of steroids, aromatase inhibitors, anticonvulsants, and other meds that may affect skeleton  None  o Chronic use vs. intermittent use with dates of usage  N/A  o Secondary conditions associated with osteoporosis  N/A  o DXA tests in past ( age or date when performed and results)   PROCEDURE: XR DEXA BONE DENSITOMETRY (CPT=77080)     COMPARISON: [facility], XR DEXA BONE DENSITOMETRY  (CPT=77080), 11/24/2020, 1:35 PM.     INDICATIONS: Age-related osteoporosis without current pathological fracture     TECHNIQUE: Measurement of bone mineral density of the lumbar spine and hip was performed on a Hologic dual energy x-ray absorptiometry scanner.     FINDINGS:     LEFT FEMORAL NECK  BMD: 0.549 gm/sq. cm. T SCORE: -2.7 Z SCORE: -1.2     LEFT TOTAL HIP  BMD: 0.672 gm/sq. cm. T SCORE: -2.2 Z SCORE: -0.9     PA LUMBAR SPINE (L1 - L4)  BMD: 0.940 gm/sq. cm. T SCORE: -1.0 Z SCORE: 0.9        T scores are a comparison to sex-matched patients with mean peak bone mass and are given in standard deviation (s.d.).  Each 1 s.d. corresponds to approximately 10% below peak normal bone density.       WORLD HEALTH ORGANIZATION CRITERIA  NORMAL T SCORE: Above -1 s.d.    OSTEOPENIA T SCORE: Between -1 and -2.5 s.d.    OSTEOPOROSIS T SCORE: -2.5 s.d.     National Osteoporosis Foundation Clinician's Guide to Prevention and Treatment of Osteoporosis recommendations for treatment:  Post menopausal women and men age 50 and older presenting with the following should be considered for treatment:  * A hip or vertebral (clinical or morphometric) fracture  * T score < -2.5 at the femoral neck or spine after appropriate evaluation to exclude secondary causes.  * Low bone mass (T score between -1.0 and -2.5 at the femoral neck or spine) and a 10-year probability of a hip fracture > 3% or a 10-year probability of a major osteoporosis-related fracture > 20% based on the [place]-adapted WHO algorithm               Impression   CONCLUSION:  1. Osteoporosis with high fracture risk.  2. When compared to baseline the left hip has gone from 0.733 to 0.672 a  loss of  8.4 % but a rise of 6.3 % from November 24, 2020  and the AP lumbar has gone from 0.88 to 0.940 a rise of 5.9 %.        10 year Fracture Risk:  Major Osteoporotic Fracture:  11 %.  Hip Fracture:  4.2 %.        Dictated by (CST): Theodore Freedman MD on 5/10/2022 at 2:43 PM       Finalized by (CST): Theodore Freedman MD on 5/10/2022 at 2:44 PM             REPRODUCTIVE HISTORY  o Menarche age   13-13  o Regular/Irregular menses   irregular  o History of amenorrhea  yes  o [facility]    o Breastfeeding  Started breasfeeding  o Contraceptives? Infertility?  Used IUD after second son, and then had it taken out, and then had another child after 13 years  o Natural menopause age   Age 49  o Hormone therapy (start and stop)  None  o Concurrent diseases and medications    PAST MEDICAL HISTORY:   Past Medical History:    Cataract    Cervical vertebral fusion    Disorder of thyroid    Essential hypertension    High blood pressure    Hyperlipidemia    Hyperthyroidism    Incontinence    Positive FIT (fecal immunochemical test)    05/10/22 EGD w/cold biopsy & Colonoscopy:  Normal EGD, internal hemorrhoids, tortuous colon    Screen for colon cancer    no polyps noted on c-scope    Visual impairment    Readers       SURGICAL HISTORY  Past Surgical History:   Procedure Laterality Date    Back surgery      Carpal tunnel release Left     Cataract Left 3/16/17    Cataract Right 2017    Colonoscopy N/A 5/10/2022    Procedure: COLONOSCOPY;  Surgeon: CRISS Fonseca MD;  Location: [facility] ENDOSCOPY    Knee surgery Left     Other surgical history      Bladder nodules removed    Other surgical history      Thyroid nodule removed   Thyroidectomy  Left Knee Surgery    o Hysterectomy  None  o Oopherectomy  None  o Gastric bypass   None  o Other including h/o transplant:  None    SOCIAL HISTORY    o Occupation    and a   [email or web address] , as a polisher     o [place] origin   [place]  o Tobacco  Smoked for only a few years  o Alcohol  Occasionally, only wine, very rarely  o Daily calcium intake (food and supplements)  Takes calcium supplements- CaCO3 600mg PO bid, MV, Vitamin D 400 units  o Daily exercise  Has a treadmill and a stationary bike    FAMILY HISTORY   Family  History   Problem Relation Age of Onset    Cancer Father     Heart Disorder Mother         Stroke    Cancer Brother     Breast Cancer Maternal Aunt 60    Breast Cancer Maternal Cousin Female 70    Cancer Niece 32        pancreatic ca    Hypertension Sister     Hypertension Sister     Other (Other) Sister         TIM    Diabetes Neg      Family history of fractures, osteoporosis, osteopenia  None    CURRENT MEDICATIONS:    Current Outpatient Medications   Medication Sig Dispense Refill    levothyroxine 137 MCG Oral Tab Take 137 mcg by mouth 3 (three) times a week. IN THE MORNING ON MONDAYS, WEDNESDAYS, AND FRIDAYS. 36 tablet 3    levothyroxine 125 MCG Oral Tab Take 1 tablet (125 mcg total) by mouth 4 (four) times a week. IN THE MORNING ON TUESDAYS, THURSDAYS, SATURDAYS, AND SUNDAYS. SHORT SUPPLY - AWAITING MAIL ORDER 4 tablet 0    hydroCHLOROthiazide 12.5 MG Oral Cap Take 2 times a week as needed for leg swelling. 90 capsule 0    dilTIAZem HCl 120 MG Oral Tab Take 1 tablet (120 mg total) by mouth 4 (four) times daily. 360 tablet 0    atorvastatin 40 MG Oral Tab Take 1 tablet (40 mg total) by mouth nightly. 90 tablet 0    omeprazole 20 MG Oral Capsule Delayed Release Take 1 capsule (20 mg total) by mouth before breakfast. 90 capsule 3    potassium chloride 10 MEQ Oral Tab CR Take 1 tablet (10 mEq total) by mouth daily. 90 tablet 3    cyanocobalamin 500 MCG Oral Tab Take 1 tablet (500 mcg total) by mouth daily.      aspirin 81 MG Oral Tab EC Take 1 tablet (81 mg total) by mouth daily.      docusate sodium 100 MG Oral Cap Take 100 mg by mouth 2 (two) times daily. Stop if loose stool. 20 capsule 0    multivitamin with minerals Oral Tab Take 1 tablet by mouth daily. 30 tablet 0    Calcium Carbonate 600 MG Oral Tab 1 tab twice a day with food. 60 tablet 0    Cholecalciferol (VITAMIN D3) 400 units Oral Tab Take by mouth.      Vitamin C 500 MG Oral Tab Take 1 tablet (500 mg total) by mouth daily.          ALLERGIES:  Allergies   Allergen Reactions    Robaxin [Methocarbamol] ITCHING         ASSESSMENTS:   PAIN ASSESSMENT: (Patient reports pain) yes  In knees, elbows  PAIN SCALE: (0-10, please indicate if applicable):  5    FALL ASSESSMENT: yes, is getting bathroom and house retrofitted, once things get better (pandemic -wise)    FUNCTIONAL ASSESSMENT (Able to perform all ADLs):  yes    REVIEW OF SYSTEMS:  No falls in past 12 months, no loss of height- none  Constitutional: Negative for: weight change, fever, fatigue, cold/heat intolerance  Eyes: Negative for:  Visual changes, proptosis, blurring  ENT: Negative for:  dysphagia, neck swelling, dysphonia  Respiratory: Negative for:  dyspnea, cough  Cardiovascular: Negative for:  chest pain, palpitations, orthopnea  GI: Negative for:  abdominal pain, nausea, vomiting, diarrhea, constipation, bleeding  Neurology: Negative for: headache, numbness, weakness  Genito-Urinary: Negative for: dysuria, frequency  Psychiatric: Negative for:  depression, anxiety  Hematology/Lymphatics: Negative for: bruising, lower extremity edema  Endocrine: Negative for: polyuria, polydypsia  Skin: Negative for: rash, blister, cellulitis,      PHYSICAL EXAM:   Vitals:    05/23/24 1142   BP: 134/60   Pulse: 67   Weight: 127 lb (57.6 kg)   Height: 5' 4\" (1.626 m)         BMI: Body mass index is 21.8 kg/m².     CONSTITUTIONAL:  awake, alert, cooperative, no apparent distress, and appears stated age  PSYCH: normal affect  EYES:  No proptosis, no ptosis, conjunctiva normal  ENT:  Normocephalic, atraumatic  NECK:  Supple, symmetrical, trachea midline, no adenopathy, thyroid symmetric, not enlarged and no tenderness- thyroidectomy scar  HEMATOLOGIC/LYMPHATICS:  no cervical lymphadenopathy and no supraclavicular lymphadenopathy  LUNGS: clear to auscultation bilaterally, no crackles or wheezing  CARDIOVASCULAR:  regular rate and rhythm, normal S1 and S2, no S3 or S4  ABDOMEN:  normal bowel sounds,  soft, non-distended, non-tender  SKIN:  no bruising or bleeding, no rashes and no lesions  EXTREMITIES:normal pulses, no edema      DATA:   CMP:    Lab Results   Component Value Date     05/20/2024    K 4.1 05/20/2024     05/20/2024    CO2 32.0 05/20/2024    BUN 11 05/20/2024    GLU 82 05/20/2024    ALB 4.5 05/20/2024    CA 9.6 05/20/2024     FLP (Lipid Profile):    Lab Results   Component Value Date    TRIG 59 03/29/2023    HDL 95 (H) 03/29/2023     LDL direct : No components found for: \"LDLDIRECT\"  Microalbumin/Creatinine ratio:  No components found for: \"RUCREAT\", \"RUMICROAL\", \"MCRATIO\"  TSH:    Lab Results   Component Value Date    TSH 0.130 (L) 05/20/2024     ASSESSMENT AND PLAN:    78 year-old woman who presents for follow-up of management of osteoporosis. She used to take alendronate and now has had an infusion of Reclast. She was supposed to have another infusion of Reclast, but this was delayed.     I wanted to check a DXA scan in May of 2024, but she has not had this yet.     I would like her to have the DEX scan and then we will administer Reclast.     Continue Ca+D 600 mg twice daily (citrate is better for patients on PPI)  Diet: Eat foods with high calcium: millk with vitamin D added, fish from the ocean, yogurt, green leafy vegetables  Exercise: 30 min atleast daily x most days of the week  Alcohol: very rarely  Tobacco: does not smoke  Fall precautions and resistance exercises discussed    TSH is lower than goal. I would like to decrease dose from 137mcg to 125mcg and recheck labs in 3 months.    Prior to this encounter, I spent over 15 minutes with preparing for the visit, including reviewing documents from other specialties as well as from PCP and going over test results and imaging studies. During the face to face encounter, I spent an additional 15 minutes which were determined for follow-up. Greater than 50% of the time was spent in counseling, anticipatory guidance, and coordination  of care. Patient concerns were answered to the best of my knowledge.        5/23/24  Ruby Hunt MD

## 2024-05-23 NOTE — PROGRESS NOTES
Penny Roblero is a 78 year old female.  Chief Complaint   Patient presents with    ER F/U     Chest pain No pain since ER visit     HPI:   Patient presents for ER follow up. She went to the ER on 5/15 due to having 1 week of intermittent sensation of palpitations as well as 3 hours ago feeling slight chest pressure associated with the palpitations. She denies any SOB.     EKG was completed in EF which showed NSR. Troponin negative. Chest x-ray showed clear lungs. D- Dimer +. CT chest completed which showed no PE and no significant coronary calcification.      She denies any chest pain. She is still experiencing palpations. She states she has been having palpations for years.     She did follow up with cardiology on 5/19. She is scheduled for a 2D Echo in 4 weeks. Hydrochlorothiazide was increased to 25mg daily due to patient experiencing bilateral lower extremity edema.     She is going to see endocrinology today. Her last TSH was 0.130.      Current Outpatient Medications   Medication Sig Dispense Refill    hydroCHLOROthiazide 12.5 MG Oral Cap Take 2 times a week as needed for leg swelling. 90 capsule 0    dilTIAZem HCl 120 MG Oral Tab Take 1 tablet (120 mg total) by mouth 4 (four) times daily. 360 tablet 0    levothyroxine 137 MCG Oral Tab Take 137 mcg by mouth 3 (three) times a week. IN THE MORNING ON MONDAYS, WEDNESDAYS, AND FRIDAYS. 36 tablet 3    levothyroxine 125 MCG Oral Tab Take 1 tablet (125 mcg total) by mouth 4 (four) times a week. IN THE MORNING ON TUESDAYS, THURSDAYS, SATURDAYS, AND SUNDAYS. SHORT SUPPLY - AWAITING MAIL ORDER 4 tablet 0    atorvastatin 40 MG Oral Tab Take 1 tablet (40 mg total) by mouth nightly. 90 tablet 0    omeprazole 20 MG Oral Capsule Delayed Release Take 1 capsule (20 mg total) by mouth before breakfast. 90 capsule 3    potassium chloride 10 MEQ Oral Tab CR Take 1 tablet (10 mEq total) by mouth daily. 90 tablet 3    cyanocobalamin 500 MCG Oral Tab Take 1 tablet (500 mcg total) by  mouth daily.      aspirin 81 MG Oral Tab EC Take 1 tablet (81 mg total) by mouth daily.      docusate sodium 100 MG Oral Cap Take 100 mg by mouth 2 (two) times daily. Stop if loose stool. 20 capsule 0    multivitamin with minerals Oral Tab Take 1 tablet by mouth daily. 30 tablet 0    Calcium Carbonate 600 MG Oral Tab 1 tab twice a day with food. 60 tablet 0    Cholecalciferol (VITAMIN D3) 400 units Oral Tab Take by mouth.      Vitamin C 500 MG Oral Tab Take 1 tablet (500 mg total) by mouth daily.        Past Medical History:    Cataract    Cervical vertebral fusion    Disorder of thyroid    Essential hypertension    High blood pressure    Hyperlipidemia    Hyperthyroidism    Incontinence    Positive FIT (fecal immunochemical test)    05/10/22 EGD w/cold biopsy & Colonoscopy:  Normal EGD, internal hemorrhoids, tortuous colon    Screen for colon cancer    no polyps noted on c-scope    Visual impairment    Readers      Past Surgical History:   Procedure Laterality Date    Back surgery      Carpal tunnel release Left     Cataract Left 3/16/17    Cataract Right 06/2017    Colonoscopy N/A 5/10/2022    Procedure: COLONOSCOPY;  Surgeon: CRISS Fonseca MD;  Location: [facility] ENDOSCOPY    Knee surgery Left 1982    Other surgical history      Bladder nodules removed    Other surgical history      Thyroid nodule removed      Social History:  Social History     Socioeconomic History    Marital status:    Tobacco Use    Smoking status: Former     Current packs/day: 0.50     Types: Cigarettes    Smokeless tobacco: Never   Vaping Use    Vaping status: Never Used   Substance and Sexual Activity    Alcohol use: Yes     Alcohol/week: 1.0 standard drink of alcohol     Types: 1 Glasses of wine per week     Comment: ocassionally    Drug use: Not Currently     Types: Cannabis     Comment: Quit when she was 29yrs old    Sexual activity: Not Currently   Other Topics Concern    Reaction to local anesthetic No      Family History    Problem Relation Age of Onset    Cancer Father     Heart Disorder Mother         Stroke    Cancer Brother     Breast Cancer Maternal Aunt 60    Breast Cancer Maternal Cousin Female 70    Cancer Niece 32        pancreatic ca    Hypertension Sister     Hypertension Sister     Other (Other) Sister         TIM    Diabetes Neg       Allergies   Allergen Reactions    Robaxin [Methocarbamol] ITCHING        REVIEW OF SYSTEMS:     Review of Systems   Constitutional:  Negative for fever.   HENT: Negative.     Respiratory:  Negative for cough, shortness of breath and wheezing.    Cardiovascular:  Positive for palpitations. Negative for chest pain.   Gastrointestinal: Negative.    Genitourinary: Negative.    Musculoskeletal: Negative.    Skin: Negative.    Neurological: Negative.    Psychiatric/Behavioral: Negative.        Wt Readings from Last 5 Encounters:   05/23/24 126 lb (57.2 kg)   05/15/24 132 lb (59.9 kg)   05/02/24 127 lb 12.8 oz (58 kg)   03/02/24 133 lb (60.3 kg)   02/21/24 130 lb (59 kg)     Body mass index is 21.63 kg/m².      EXAM:   /72   Pulse 74   Resp 16   Ht 5' 4\" (1.626 m)   Wt 126 lb (57.2 kg)   BMI 21.63 kg/m²     Physical Exam  Vitals reviewed.   Constitutional:       Appearance: Normal appearance.   HENT:      Head: Normocephalic.   Cardiovascular:      Rate and Rhythm: Normal rate and regular rhythm.      Pulses: Normal pulses.   Pulmonary:      Breath sounds: Normal breath sounds. No wheezing.   Musculoskeletal:         General: No swelling. Normal range of motion.   Skin:     General: Skin is warm and dry.   Neurological:      Mental Status: She is alert and oriented to person, place, and time.   Psychiatric:         Mood and Affect: Mood normal.         Behavior: Behavior normal.              Component      Latest Ref Rng 5/15/2024   WBC      4.0 - 11.0 x10(3) uL 5.5    RBC      3.80 - 5.30 x10(6)uL 3.67 (L)    Hemoglobin      12.0 - 16.0 g/dL 11.1 (L)    Hematocrit      35.0 - 48.0 %  35.8    MCV      80.0 - 100.0 fL 97.5    MCH      26.0 - 34.0 pg 30.2    MCHC      31.0 - 37.0 g/dL 31.0    RDW-SD      35.1 - 46.3 fL 46.8 (H)    RDW      11.0 - 15.0 % 13.2    Platelet Count      150.0 - 450.0 10(3)uL 240.0    Prelim Neutrophil Abs      1.50 - 7.70 x10 (3) uL 3.28    Neutrophils Absolute      1.50 - 7.70 x10(3) uL 3.28    Lymphocytes Absolute      1.00 - 4.00 x10(3) uL 1.76    Monocytes Absolute      0.10 - 1.00 x10(3) uL 0.40    Eosinophils Absolute      0.00 - 0.70 x10(3) uL 0.05    Basophils Absolute      0.00 - 0.20 x10(3) uL 0.03    Immature Granulocyte Absolute      0.00 - 1.00 x10(3) uL 0.01    Neutrophils %      % 59.4    Lymphocytes %      % 31.8    Monocytes %      % 7.2    Eosinophils %      % 0.9    Basophils %      % 0.5    Immature Granulocyte %      % 0.2    Glucose      70 - 99 mg/dL 88    Sodium      136 - 145 mmol/L 142    Potassium      3.5 - 5.1 mmol/L 3.6    Chloride      98 - 112 mmol/L 108    Carbon Dioxide, Total      21.0 - 32.0 mmol/L 30.0    ANION GAP      0 - 18 mmol/L 4    BUN      9 - 23 mg/dL 10    CREATININE      0.55 - 1.02 mg/dL 0.70    BUN/CREATININE RATIO      10.0 - 20.0  14.3    CALCIUM      8.7 - 10.4 mg/dL 9.6    CALCULATED OSMOLALITY      275 - 295 mOsm/kg 292    EGFR      >=60 mL/min/1.73m2 88    ALT (SGPT)      10 - 49 U/L 11    AST (SGOT)      <=34 U/L 19    ALKALINE PHOSPHATASE      55 - 142 U/L 55    Total Bilirubin      0.2 - 1.1 mg/dL 1.3 (H)    PROTEIN, TOTAL      5.7 - 8.2 g/dL 7.5    Albumin      3.2 - 4.8 g/dL 4.4    Globulin      2.0 - 3.5 g/dL 3.1    A/G Ratio      1.0 - 2.0  1.4    Troponin I (High Sensitivity)      <=34 ng/L 3    Troponin I (High Sensitivity)       3    D-Dimer      <0.79 ug/mL FEU 1.40 (H)       Legend:  (L) Low  (H) High    ASSESSMENT AND PLAN:   1. Hospital discharge follow-up  - hospital notes, imaging and lab results reviewed.      2. Palpitations  - following up with endocrinology today. Has history of hypothyroidism.  Last TSH was abnormal 0.130.     3. Essential hypertension  - CPM  - patient currently taking hydrochlorothiazide and metoprolol     4. Chest pain, unspecified type  - resolved     5. Rib pain on right side  - ? Costochondritis   - happened secondary to a fall.   - started on colchicine via cardiology     6. Atrial tachycardia (HCC)  - continue Cardizem 120mg daily      7. Localized edema   - 2D echo in 4 weeks per cardiology  - hydrochlorothiazide increased to 25mg daily per cardiology         The patient indicates understanding of these issues and agrees to the plan.

## 2024-05-28 ENCOUNTER — TELEPHONE (OUTPATIENT)
Dept: INTERNAL MEDICINE CLINIC | Facility: CLINIC | Age: 79
End: 2024-05-28

## 2024-05-28 RX ORDER — LEVOTHYROXINE SODIUM 0.12 MG/1
125 TABLET ORAL
Qty: 90 TABLET | Refills: 0 | Status: SHIPPED | OUTPATIENT
Start: 2024-05-28 | End: 2024-08-26

## 2024-05-28 NOTE — TELEPHONE ENCOUNTER
Dr. Burroughs, before I contact pharmacy, I copied Dr. Aguilera's recommendation from 05/19 visit:    \"Problem #3 edema. Present on exam increase hydrochlorothiazide to 25 mg daily repeat echocardiogram to reassess LV function.\"    Also copied from Katarzyna Garrido's note of 05/23:    \"7. Localized edema   - 2D echo in 4 weeks per cardiology  - hydrochlorothiazide increased to 25mg daily per cardiology\"    Do you want her to go from 25 mg daily to 12.5 mg twice weekly?

## 2024-05-28 NOTE — TELEPHONE ENCOUNTER
12.5 should be fine.   Low vitamin D sent rx      for mammo next Fall    Family history of cerebral aneurysm: 1 second degree relative  So for now since 2 other CTs (though without angiogram) will not scan    Family history of ovarian cancer paternal second degree relatives refer to genetic counseling   5 [phone]    Weight up 10# or so  Initial blood pressure a little high-- but grief/  Lets keep an eye and recheck and see me in a few months on this with your bp cuff.    (new lower targets for blood pressure now)    Right leg: running  Xray of tibia   To sports med MD as needed  Shorten stride increase maikol and see if this still bothers. Let me know        Whole foods diet low in sugar and refined flour, 8 hours of sleep nightly, 30 minutes of cardio like a brisk walk daily, 2 weight training/ resistance work outs a week.  All eating in 10-12 hour window    Read and follow:  Fast carbs, Slow carbs by Keena QURESHI       I am so sorry for your loss today.

## 2024-05-28 NOTE — TELEPHONE ENCOUNTER
Pharmacy notified with understanding.  They also seek clarification on diltiazem.  Nurse referred them to cardiologist for this.  Patient notified with understanding.

## 2024-06-04 ENCOUNTER — TELEPHONE (OUTPATIENT)
Dept: INTERNAL MEDICINE CLINIC | Facility: CLINIC | Age: 79
End: 2024-06-04

## 2024-06-04 NOTE — TELEPHONE ENCOUNTER
Tried calling Patient. No answer and unable to leave a message.  Mailbox is full.  Teranode message sent

## 2024-06-04 NOTE — TELEPHONE ENCOUNTER
Patient is requesting a call back to go over her medications.  Patient mentions she will be traveling on 6/5/24 and would like to go over medications this morning with the nurse.   Please advise.

## 2024-06-04 NOTE — TELEPHONE ENCOUNTER
Patient called, verified Name and . States she got 3 medications in the mail yesterday. She wants to make sure she is taking them right as she will be traveling. Instruction on how to take hydrochlorothiazide (25 mg daily - see telephone encounter  Medication Question) reviewed.    She will follow up Dr. Aguilera's office for clarification on colchicine and diltiazem dosing instructions. Patient verbalized understanding and had no further questions at this time.

## 2024-06-07 DIAGNOSIS — E78.00 HYPERCHOLESTEROLEMIA: ICD-10-CM

## 2024-06-11 ENCOUNTER — HOSPITAL ENCOUNTER (OUTPATIENT)
Dept: BONE DENSITY | Age: 79
Discharge: HOME | End: 2024-06-11
Attending: INTERNAL MEDICINE
Payer: MEDICARE

## 2024-06-11 DIAGNOSIS — M81.0 AGE-RELATED OSTEOPOROSIS WITHOUT CURRENT PATHOLOGICAL FRACTURE: ICD-10-CM

## 2024-06-11 PROCEDURE — 77080 DXA BONE DENSITY AXIAL: CPT | Performed by: INTERNAL MEDICINE

## 2024-06-11 RX ORDER — ATORVASTATIN CALCIUM 40 MG/1
40 TABLET, FILM COATED ORAL NIGHTLY
Qty: 90 TABLET | Refills: 3 | Status: SHIPPED | OUTPATIENT
Start: 2024-06-11

## 2024-06-11 NOTE — TELEPHONE ENCOUNTER
Please Review. Protocol Failed; No Protocol   No Active/ Future labs pended  Recent Visits  Date Type Provider Dept   05/23/24 Office Visit Katarzyna Garrido APRN [facility]   05/02/24 Office Visit Lizy Burroughs MD [facility]   03/02/24 Office Visit Orlin Lucia MD [facility]   02/20/24 Office Visit Katarzyna Garrido APRN [facility]   02/08/24 Office Visit Lizy Burroughs MD [facility]   Showing recent visits within past 540 days with a meds authorizing provider and meeting all other requirements  Future Appointments  Date Type Provider Dept   07/15/24 Appointment Lizy Burroughs MD [facility]   08/14/24 Appointment Nila Mason MD [facility]   Showing future appointments within next 150 days with a meds authorizing provider and meeting all other requirements    Requested Prescriptions   Pending Prescriptions Disp Refills    ATORVASTATIN 40 MG Oral Tab [Pharmacy Med Name: ATORVASTATIN CALCIUM 40 MG Tablet] 90 tablet 3     Sig: TAKE 1 TABLET EVERY NIGHT       Cholesterol Medication Protocol Failed - 6/7/2024  1:32 AM        Failed - Lipid panel within past 12 months     Lab Results   Component Value Date    CHOLEST 257 (H) 03/29/2023    TRIG 59 03/29/2023    HDL 95 (H) 03/29/2023     (H) 03/29/2023    VLDL 11 03/29/2023    NONHDLC 162 (H) 03/29/2023             Passed - ALT < 80     Lab Results   Component Value Date    ALT 15 05/20/2024             Passed - ALT resulted within past year        Passed - In person appointment or virtual visit in the past 12 mos or appointment in next 3 mos     Recent Outpatient Visits              2 weeks ago Age-related osteoporosis without current pathological fracture    [facility], Darlene Lewis Sudha K, MD    Office Visit    2 weeks ago Hospital discharge follow-up    [facility], [facility], SalyerKatarzyna Griffith APRN    Office  Visit    1 month ago Chest pain, unspecified type    [facility] Lizy Burroughs MD    Office Visit    3 months ago Localized edema    [facility] Orlin Lucia MD    Office Visit    3 months ago [address] of toe    [facility] Julia Liz DPM    Office Visit          Future Appointments         Provider Department Appt Notes    Today HND JOE RM1; HND DEXA RM1 [facility] DEXA - [place]     In 1 month Lizy Burroughs MD [facility] follow up tsh    In 2 months Nila Mason MD [facility] SUPER last 2/21/22 ok per Dr. Dotson                           Future Appointments         Provider Department Appt Notes    Today HND JOE RM1; HND DEXA RM1 [facility] DEXA - [place]     In 1 month Lizy Burroughs MD [facility] follow up tsh    In 2 months Nila Mason MD [facility] SUPER last 2/21/22 ok per Dr. Dotson          Recent Outpatient Visits              2 weeks ago [facility] current pathological fracture    [facility]    Office Visit    2 weeks ago Hospital discharge follow-up    [facility] Katarzyna Garrido APRN    Office Visit    1 month ago Chest pain, unspecified type    [facility] Lizy Burroughs MD    Office Visit    3 months ago Localized edema    [facility] Orlin Lucia MD    Office Visit    3 months ago [address] of toe    [facility] Julia Liz DPM    Office Visit

## 2024-06-24 ENCOUNTER — NURSE TRIAGE (OUTPATIENT)
Dept: INTERNAL MEDICINE CLINIC | Facility: CLINIC | Age: 79
End: 2024-06-24

## 2024-06-24 ENCOUNTER — OFFICE VISIT (OUTPATIENT)
Dept: INTERNAL MEDICINE CLINIC | Facility: CLINIC | Age: 79
End: 2024-06-24

## 2024-06-24 ENCOUNTER — LAB ENCOUNTER (OUTPATIENT)
Dept: LAB | Age: 79
End: 2024-06-24
Attending: NURSE PRACTITIONER

## 2024-06-24 VITALS
BODY MASS INDEX: 20.14 KG/M2 | WEIGHT: 118 LBS | RESPIRATION RATE: 16 BRPM | DIASTOLIC BLOOD PRESSURE: 69 MMHG | HEART RATE: 88 BPM | SYSTOLIC BLOOD PRESSURE: 114 MMHG | HEIGHT: 64 IN

## 2024-06-24 DIAGNOSIS — R63.4 WEIGHT LOSS: ICD-10-CM

## 2024-06-24 DIAGNOSIS — R10.30 LOWER ABDOMINAL PAIN: Primary | ICD-10-CM

## 2024-06-24 DIAGNOSIS — R10.30 LOWER ABDOMINAL PAIN: ICD-10-CM

## 2024-06-24 LAB
ALBUMIN SERPL-MCNC: 4.9 G/DL (ref 3.2–4.8)
ALBUMIN/GLOB SERPL: 1.5 {RATIO} (ref 1–2)
ALP LIVER SERPL-CCNC: 59 U/L
ALT SERPL-CCNC: 12 U/L
ANION GAP SERPL CALC-SCNC: 7 MMOL/L (ref 0–18)
AST SERPL-CCNC: 17 U/L (ref ?–34)
BILIRUB SERPL-MCNC: 1.8 MG/DL (ref 0.2–1.1)
BILIRUB UR QL: NEGATIVE
BUN BLD-MCNC: 15 MG/DL (ref 9–23)
BUN/CREAT SERPL: 17 (ref 10–20)
CALCIUM BLD-MCNC: 10.4 MG/DL (ref 8.7–10.4)
CHLORIDE SERPL-SCNC: 101 MMOL/L (ref 98–112)
CLARITY UR: CLEAR
CO2 SERPL-SCNC: 32 MMOL/L (ref 21–32)
COLOR UR: YELLOW
CREAT BLD-MCNC: 0.88 MG/DL
DEPRECATED RDW RBC AUTO: 43.4 FL (ref 35.1–46.3)
EGFRCR SERPLBLD CKD-EPI 2021: 67 ML/MIN/1.73M2 (ref 60–?)
ERYTHROCYTE [DISTWIDTH] IN BLOOD BY AUTOMATED COUNT: 12.6 % (ref 11–15)
FASTING STATUS PATIENT QL REPORTED: NO
GLOBULIN PLAS-MCNC: 3.3 G/DL (ref 2–3.5)
GLUCOSE BLD-MCNC: 121 MG/DL (ref 70–99)
GLUCOSE UR-MCNC: NORMAL MG/DL
HCT VFR BLD AUTO: 38.7 %
HGB BLD-MCNC: 12.9 G/DL
KETONES UR-MCNC: NEGATIVE MG/DL
LEUKOCYTE ESTERASE UR QL STRIP.AUTO: 75
MCH RBC QN AUTO: 31.6 PG (ref 26–34)
MCHC RBC AUTO-ENTMCNC: 33.3 G/DL (ref 31–37)
MCV RBC AUTO: 94.9 FL
NITRITE UR QL STRIP.AUTO: NEGATIVE
OSMOLALITY SERPL CALC.SUM OF ELEC: 292 MOSM/KG (ref 275–295)
PH UR: 5.5 [PH] (ref 5–8)
PLATELET # BLD AUTO: 272 10(3)UL (ref 150–450)
POTASSIUM SERPL-SCNC: 3.1 MMOL/L (ref 3.5–5.1)
PROT SERPL-MCNC: 8.2 G/DL (ref 5.7–8.2)
PROT UR-MCNC: NEGATIVE MG/DL
RBC # BLD AUTO: 4.08 X10(6)UL
SODIUM SERPL-SCNC: 140 MMOL/L (ref 136–145)
SP GR UR STRIP: 1.02 (ref 1–1.03)
UROBILINOGEN UR STRIP-ACNC: NORMAL
WBC # BLD AUTO: 7.2 X10(3) UL (ref 4–11)

## 2024-06-24 PROCEDURE — 1160F RVW MEDS BY RX/DR IN RCRD: CPT | Performed by: NURSE PRACTITIONER

## 2024-06-24 PROCEDURE — 1159F MED LIST DOCD IN RCRD: CPT | Performed by: NURSE PRACTITIONER

## 2024-06-24 PROCEDURE — 3074F SYST BP LT 130 MM HG: CPT | Performed by: NURSE PRACTITIONER

## 2024-06-24 PROCEDURE — 85027 COMPLETE CBC AUTOMATED: CPT

## 2024-06-24 PROCEDURE — 99214 OFFICE O/P EST MOD 30 MIN: CPT | Performed by: NURSE PRACTITIONER

## 2024-06-24 PROCEDURE — 3008F BODY MASS INDEX DOCD: CPT | Performed by: NURSE PRACTITIONER

## 2024-06-24 PROCEDURE — 36415 COLL VENOUS BLD VENIPUNCTURE: CPT

## 2024-06-24 PROCEDURE — 1170F FXNL STATUS ASSESSED: CPT | Performed by: NURSE PRACTITIONER

## 2024-06-24 PROCEDURE — 3078F DIAST BP <80 MM HG: CPT | Performed by: NURSE PRACTITIONER

## 2024-06-24 PROCEDURE — 87086 URINE CULTURE/COLONY COUNT: CPT | Performed by: NURSE PRACTITIONER

## 2024-06-24 PROCEDURE — 81001 URINALYSIS AUTO W/SCOPE: CPT | Performed by: NURSE PRACTITIONER

## 2024-06-24 PROCEDURE — 80053 COMPREHEN METABOLIC PANEL: CPT

## 2024-06-24 NOTE — TELEPHONE ENCOUNTER
Action Requested: Summary for Provider     []  Critical Lab, Recommendations Needed  [] Need Additional Advice  []   FYI    []   Need Orders  [] Need Medications Sent to Pharmacy  []  Other     SUMMARY: Office visit today     Future Appointments   Date Time Provider Department Center   6/24/2024  2:40 PM Katarzyna Garrido APRN [facility]   7/15/2024 11:40 AM Lizy Burroughs MD [facility]   8/14/2024 12:20 PM [facility]     Reviewed emergency symptoms and when patient should be seen sooner in ER/ICC.     Reason for call: Abdominal Pain (/)  Onset: 4-5 days   Patient states she is having right side/lower abdominal pain.Flank pain has been present for several months. Abdominal pain present for 4-5 days and is mild. Denies any vomiting, diarrhea or urinary changes. Afebrile.   Reason for Disposition   Age > 60 years    Protocols used: Abdominal Pain - Female-A-[place]

## 2024-06-24 NOTE — PROGRESS NOTES
Penny Roblero is a 78 year old female.  Chief Complaint   Patient presents with    Pain     Stomach pain under the navel     HPI:   She presents with lower abdominal pain located under her navel. She has been having the pain for a little while.     The abdominal pain intermittent. Currently her pain is zero. The pain can be a 7 out of 10.   She states her appetite has been decreased because of the pain. She is having constipation.   No nausea or vomiting  No diarrhea. No blood in stool.     She is taking care of her sister who is 93. Her sister lives in [place]. She will be leaving for one month.     Current Outpatient Medications   Medication Sig Dispense Refill    atorvastatin 40 MG Oral Tab Take 1 tablet (40 mg total) by mouth nightly. 90 tablet 3    levothyroxine 125 MCG Oral Tab Take 1 tablet (125 mcg total) by mouth before breakfast. 90 tablet 0    hydroCHLOROthiazide 12.5 MG Oral Cap Take 2 times a week as needed for leg swelling. (Patient taking differently: Take 2 capsules (25 mg total) by mouth daily. Take 2 times a week as needed for leg swelling.) 90 capsule 0    dilTIAZem HCl 120 MG Oral Tab Take 1 tablet (120 mg total) by mouth 4 (four) times daily. 360 tablet 0    omeprazole 20 MG Oral Capsule Delayed Release Take 1 capsule (20 mg total) by mouth before breakfast. 90 capsule 3    potassium chloride 10 MEQ Oral Tab CR Take 1 tablet (10 mEq total) by mouth daily. 90 tablet 3    cyanocobalamin 500 MCG Oral Tab Take 1 tablet (500 mcg total) by mouth daily.      aspirin 81 MG Oral Tab EC Take 1 tablet (81 mg total) by mouth daily.      docusate sodium 100 MG Oral Cap Take 100 mg by mouth 2 (two) times daily. Stop if loose stool. 20 capsule 0    multivitamin with minerals Oral Tab Take 1 tablet by mouth daily. 30 tablet 0    Calcium Carbonate 600 MG Oral Tab 1 tab twice a day with food. 60 tablet 0    Cholecalciferol (VITAMIN D3) 400 units Oral Tab Take by mouth.      Vitamin C 500 MG Oral Tab Take 1  tablet (500 mg total) by mouth daily.        Past Medical History:    Cataract    Cervical vertebral fusion    Disorder of thyroid    Essential hypertension    High blood pressure    Hyperlipidemia    Hyperthyroidism    Incontinence    Positive FIT (fecal immunochemical test)    05/10/22 EGD w/cold biopsy & Colonoscopy:  Normal EGD, internal hemorrhoids, tortuous colon    Screen for colon cancer    no polyps noted on c-scope    Visual impairment    Readers      Past Surgical History:   Procedure Laterality Date    Back surgery      Carpal tunnel release Left     Cataract Left 3/16/17    Cataract Right 06/2017    Colonoscopy N/A 5/10/2022    Procedure: COLONOSCOPY;  Surgeon: CRISS Fonseca MD;  Location: [facility] ENDOSCOPY    Knee surgery Left 1982    Other surgical history      Bladder nodules removed    Other surgical history      Thyroid nodule removed      Social History:  Social History     Socioeconomic History    Marital status:    Tobacco Use    Smoking status: Former     Current packs/day: 0.50     Types: Cigarettes    Smokeless tobacco: Never   Vaping Use    Vaping status: Never Used   Substance and Sexual Activity    Alcohol use: Yes     Alcohol/week: 1.0 standard drink of alcohol     Types: 1 Glasses of wine per week     Comment: ocassionally    Drug use: Not Currently     Types: Cannabis     Comment: Quit when she was 29yrs old    Sexual activity: Not Currently   Other Topics Concern    Reaction to local anesthetic No      Family History   Problem Relation Age of Onset    Cancer Father     Heart Disorder Mother         Stroke    Cancer Brother     Breast Cancer Maternal Aunt 60    Breast Cancer Maternal Cousin Female 70    Cancer Niece 32        pancreatic ca    Hypertension Sister     Hypertension Sister     Other (Other) Sister         TIM    Diabetes Neg       Allergies   Allergen Reactions    Robaxin [Methocarbamol] ITCHING        REVIEW OF SYSTEMS:     Review of Systems   Constitutional:   Negative for fever.   HENT: Negative.     Respiratory:  Negative for cough, shortness of breath and wheezing.    Cardiovascular:  Negative for chest pain.   Gastrointestinal:  Positive for abdominal pain and constipation. Negative for nausea and vomiting.   Genitourinary:  Negative for dysuria.   Musculoskeletal: Negative.    Skin: Negative.    Neurological:  Negative for dizziness and headaches.   Psychiatric/Behavioral: Negative.        Wt Readings from Last 5 Encounters:   06/24/24 118 lb (53.5 kg)   05/23/24 127 lb (57.6 kg)   05/23/24 126 lb (57.2 kg)   05/15/24 132 lb (59.9 kg)   05/02/24 127 lb 12.8 oz (58 kg)     Body mass index is 20.25 kg/m².      EXAM:   /69   Pulse 88   Resp 16   Ht 5' 4\" (1.626 m)   Wt 118 lb (53.5 kg)   BMI 20.25 kg/m²     Physical Exam  Vitals reviewed.   Constitutional:       Appearance: Normal appearance.   HENT:      Head: Normocephalic.   Eyes:      Extraocular Movements: Extraocular movements intact.      Pupils: Pupils are equal, round, and reactive to light.   Cardiovascular:      Rate and Rhythm: Normal rate and regular rhythm.      Pulses: Normal pulses.   Pulmonary:      Breath sounds: Normal breath sounds. No wheezing.   Abdominal:      General: Bowel sounds are normal.      Palpations: Abdomen is soft.      Tenderness: There is no abdominal tenderness.          Comments: No tenderness on exam, the pain does not radiate   Musculoskeletal:         General: No swelling. Normal range of motion.      Cervical back: Normal range of motion and neck supple.   Skin:     General: Skin is warm and dry.   Neurological:      Mental Status: She is alert and oriented to person, place, and time.   Psychiatric:         Mood and Affect: Mood normal.         Behavior: Behavior normal.            ASSESSMENT AND PLAN:   1. Lower abdominal pain  - UA/M WITH CULTURE REFLEX [3020]  - Comp Metabolic Panel (14); Future  - CBC, Platelet; No Differential; Future  - CT  ABDOMEN+PELVIS(CPT=74176); Future    2. Weight loss  - CT Abdomen-pelvis ordered for further evaluation   - she was 132 pounds in May and 118 pounds today       The patient indicates understanding of these issues and agrees to the plan.  Return for if symptoms do not resolve.

## 2024-08-14 ENCOUNTER — OFFICE VISIT (OUTPATIENT)
Dept: INTERNAL MEDICINE CLINIC | Facility: CLINIC | Age: 79
End: 2024-08-14

## 2024-08-14 ENCOUNTER — LAB ENCOUNTER (OUTPATIENT)
Dept: LAB | Age: 79
End: 2024-08-14
Attending: INTERNAL MEDICINE
Payer: MEDICARE

## 2024-08-14 VITALS
HEIGHT: 64 IN | HEART RATE: 67 BPM | SYSTOLIC BLOOD PRESSURE: 107 MMHG | BODY MASS INDEX: 20.79 KG/M2 | WEIGHT: 121.81 LBS | DIASTOLIC BLOOD PRESSURE: 65 MMHG

## 2024-08-14 DIAGNOSIS — Z00.00 ENCOUNTER FOR ANNUAL HEALTH EXAMINATION: Primary | ICD-10-CM

## 2024-08-14 DIAGNOSIS — M15.0 PRIMARY OSTEOARTHRITIS INVOLVING MULTIPLE JOINTS: ICD-10-CM

## 2024-08-14 DIAGNOSIS — K59.04 CHRONIC IDIOPATHIC CONSTIPATION: ICD-10-CM

## 2024-08-14 DIAGNOSIS — E03.9 ACQUIRED HYPOTHYROIDISM: ICD-10-CM

## 2024-08-14 DIAGNOSIS — E78.2 MIXED HYPERLIPIDEMIA: ICD-10-CM

## 2024-08-14 DIAGNOSIS — D64.9 ANEMIA, UNSPECIFIED TYPE: ICD-10-CM

## 2024-08-14 DIAGNOSIS — E87.6 HYPOKALEMIA: ICD-10-CM

## 2024-08-14 DIAGNOSIS — M81.0 AGE-RELATED OSTEOPOROSIS WITHOUT CURRENT PATHOLOGICAL FRACTURE: ICD-10-CM

## 2024-08-14 DIAGNOSIS — I10 ESSENTIAL HYPERTENSION: ICD-10-CM

## 2024-08-14 DIAGNOSIS — H40.009 PREGLAUCOMA, UNSPECIFIED LATERALITY: ICD-10-CM

## 2024-08-14 DIAGNOSIS — K21.9 GASTROESOPHAGEAL REFLUX DISEASE, UNSPECIFIED WHETHER ESOPHAGITIS PRESENT: ICD-10-CM

## 2024-08-14 DIAGNOSIS — H43.399 VITREOUS FLOATERS, UNSPECIFIED LATERALITY: ICD-10-CM

## 2024-08-14 DIAGNOSIS — N39.490 OVERFLOW INCONTINENCE OF URINE: ICD-10-CM

## 2024-08-14 DIAGNOSIS — I67.9 CEREBROVASCULAR DISEASE: ICD-10-CM

## 2024-08-14 DIAGNOSIS — I47.19 ATRIAL TACHYCARDIA (HCC): ICD-10-CM

## 2024-08-14 DIAGNOSIS — E55.9 VITAMIN D DEFICIENCY: ICD-10-CM

## 2024-08-14 PROBLEM — M54.50 ACUTE LEFT-SIDED LOW BACK PAIN WITHOUT SCIATICA: Status: RESOLVED | Noted: 2019-05-02 | Resolved: 2024-08-14

## 2024-08-14 PROBLEM — R07.81 RIB PAIN ON RIGHT SIDE: Status: RESOLVED | Noted: 2021-03-17 | Resolved: 2024-08-14

## 2024-08-14 PROBLEM — M17.11 OSTEOARTHRITIS OF RIGHT KNEE: Status: RESOLVED | Noted: 2022-06-07 | Resolved: 2024-08-14

## 2024-08-14 LAB
CHOLEST SERPL-MCNC: 272 MG/DL (ref ?–200)
HDLC SERPL-MCNC: 75 MG/DL (ref 40–59)
LDLC SERPL CALC-MCNC: 187 MG/DL (ref ?–100)
MAGNESIUM SERPL-MCNC: 1.9 MG/DL (ref 1.6–2.6)
NONHDLC SERPL-MCNC: 197 MG/DL (ref ?–130)
T3FREE SERPL-MCNC: 2.3 PG/ML (ref 2.4–4.2)
T4 FREE SERPL-MCNC: 1.2 NG/DL (ref 0.8–1.7)
TRIGL SERPL-MCNC: 64 MG/DL (ref 30–149)
TSI SER-ACNC: 0.49 MIU/ML (ref 0.55–4.78)
VLDLC SERPL CALC-MCNC: 13 MG/DL (ref 0–30)

## 2024-08-14 PROCEDURE — 84481 FREE ASSAY (FT-3): CPT

## 2024-08-14 PROCEDURE — 36415 COLL VENOUS BLD VENIPUNCTURE: CPT

## 2024-08-14 PROCEDURE — 84439 ASSAY OF FREE THYROXINE: CPT

## 2024-08-14 PROCEDURE — 83735 ASSAY OF MAGNESIUM: CPT

## 2024-08-14 PROCEDURE — 84443 ASSAY THYROID STIM HORMONE: CPT

## 2024-08-14 PROCEDURE — 80061 LIPID PANEL: CPT

## 2024-08-14 RX ORDER — LEVOTHYROXINE SODIUM 0.12 MG/1
125 TABLET ORAL
Qty: 90 TABLET | Refills: 0 | Status: CANCELLED | OUTPATIENT
Start: 2024-08-14 | End: 2024-11-12

## 2024-08-14 RX ORDER — LEVOTHYROXINE SODIUM 0.12 MG/1
TABLET ORAL
COMMUNITY
Start: 2024-08-14

## 2024-08-14 RX ORDER — HYDROCHLOROTHIAZIDE 25 MG/1
25 TABLET ORAL DAILY
Qty: 90 TABLET | Refills: 3 | Status: SHIPPED | OUTPATIENT
Start: 2024-08-14

## 2024-08-14 NOTE — PROGRESS NOTES
Subjective:   Penny Roblero is a 78 year old female who presents for a MA AHA (Medicare Advantage Annual Health Assessment) and Medicare Subsequent Annual Wellness visit (Pt already had Initial Annual Wellness) and scheduled follow up of multiple significant but stable problems.   New patient  C/C establish care-used to see Dr. Thomas  C/o here for her Medicare physical  Overall doing well per pt   Just came back from visiting her sister who had a stroke in [place]-was there for 1- 2 month  Went through all her meds           PMH  Hypothyroidism-sees endo    HTN  Hypokalemia   GERD   HL  OP    Syl lopez     Lives alone (grandson living with her), retired factory work   -----------------------------------------------------------------------------------------------------------------        History/Other:   Fall Risk Assessment:   She has been screened for Falls and is High Risk. Fall Prevention information provided to patient in After Visit Summary.    Do you feel unsteady when standing or walking?: Yes  Do you worry about falling?: Yes  Have you fallen in the past year?: No     Cognitive Assessment:   Abnormal  What day of the week is this?: Correct  What month is it?: Correct  What year is it?: Correct  Recall \"Ball\": Correct  Recall \"Flag\": Correct  Recall \"Tree\": Incorrect    Functional Ability/Status:   Penny Roblero has some abnormal functions as listed below:  She has Vision problems based on screening of functional status. She has Walking problems based on screening of functional status. She has problems with Daily Activities based on screening of functional status. She has problems with Memory based on screening of functional status.       Depression Screening (PHQ):  PHQ-2 SCORE: 0  , done 8/14/2024   Last [place] Suicide Screening on 8/14/2024 was No Risk.          Advanced Directives:   She does NOT have a Living Will. [Do you have a living will?: No]  She does NOT have a Power  of  for Health Care. [Do you have a healthcare power of ?: No]  Discussed Advance Care Planning with patient (and family/surrogate if present). Standard forms made available to patient in After Visit Summary.      Patient Active Problem List   Diagnosis    Essential hypertension    Acquired hypothyroidism    Hyperlipidemia    Cerebrovascular disease    Preglaucoma    Age-related osteoporosis without current pathological fracture    Primary osteoarthritis involving multiple joints    Chronic idiopathic constipation    Anemia    Overflow incontinence of urine    Atrial tachycardia (HCC)    Vitreous floaters    Gastroesophageal reflux disease    Vitamin D deficiency     Allergies:  She is allergic to robaxin [methocarbamol].    Current Medications:  Outpatient Medications Marked as Taking for the 8/14/24 encounter (Office Visit) with Nila Mason MD   Medication Sig    levothyroxine 125 MCG Oral Tab Monday Wednesday Friday and 137 mcg and all other days    hydroCHLOROthiazide 25 MG Oral Tab Take 1 tablet (25 mg total) by mouth daily.    atorvastatin 40 MG Oral Tab Take 1 tablet (40 mg total) by mouth nightly.    dilTIAZem HCl 120 MG Oral Tab Take 1 tablet (120 mg total) by mouth 4 (four) times daily.    omeprazole 20 MG Oral Capsule Delayed Release Take 1 capsule (20 mg total) by mouth before breakfast.    potassium chloride 10 MEQ Oral Tab CR Take 1 tablet (10 mEq total) by mouth daily.    cyanocobalamin 500 MCG Oral Tab Take 1 tablet (500 mcg total) by mouth daily.    aspirin 81 MG Oral Tab EC Take 1 tablet (81 mg total) by mouth daily.    docusate sodium 100 MG Oral Cap Take 100 mg by mouth 2 (two) times daily. Stop if loose stool.    multivitamin with minerals Oral Tab Take 1 tablet by mouth daily.    Cholecalciferol (VITAMIN D3) 400 units Oral Tab Take by mouth.    Vitamin C 500 MG Oral Tab Take 1 tablet (500 mg total) by mouth daily.       Medical History:  She  has a past medical history of  Cataract, Cervical vertebral fusion (10/2/2014), Disorder of thyroid, Essential hypertension, High blood pressure, Hyperlipidemia, Hyperthyroidism, Incontinence, Positive FIT (fecal immunochemical test) (10/11/2022), Screen for colon cancer (2022), and Visual impairment.  Surgical History:  She  has a past surgical history that includes back surgery; other surgical history; other surgical history; cataract (Left, 3/16/17); cataract (Right, 06/2017); knee surgery (Left, 1982); colonoscopy (N/A, 5/10/2022); and carpal tunnel release (Left).   Family History:  Her family history includes Breast Cancer (age of onset: 60) in her maternal aunt; Breast Cancer (age of onset: 70) in her maternal cousin female; Cancer in her brother and father; Cancer (age of onset: 32) in her niece; Heart Disorder in her mother; Hypertension in her sister and sister; Other in her sister.  Social History:  She  reports that she has quit smoking. Her smoking use included cigarettes. She has never used smokeless tobacco. She reports current alcohol use of about 1.0 standard drink of alcohol per week. She reports that she does not currently use drugs after having used the following drugs: Cannabis.    Tobacco:  She smoked tobacco in the past but quit greater than 12 months ago.  Social History     Tobacco Use   Smoking Status Former    Current packs/day: 0.50    Types: Cigarettes   Smokeless Tobacco Never          CAGE Alcohol Screen:   CAGE screening score of 0 on 8/14/2024, showing low risk of alcohol abuse.      Patient Care Team:  Nila Mason MD as PCP - General (Internal Medicine)  Johanna Ventura PT as Physical Therapist (Physical Therapy)  Julia Liz DPM as Consulting Physician (PODIATRIST)    Review of Systems  GENERAL: feels well otherwise  SKIN: denies any unusual skin lesions  EYES: + blurred vision, no  double vision-sees the eye dr   HEENT: denies nasal congestion, sinus pain or ST  LUNGS: denies shortness of  breath with exertion  CARDIOVASCULAR: denies chest pain on exertion  GI: denies abdominal pain, denies heartburn  : denies dysuria, vaginal discharge or itching, no complaint of urinary incontinence   MUSCULOSKELETAL: denies back pain  NEURO: denies headaches  PSYCHE: denies depression or anxiety  HEMATOLOGIC: denies hx of anemia  ENDOCRINE:  + thyroid history  ALL/ASTHMA: denies hx of allergy or asthma    Objective:   Physical Exam  GENERAL: well developed, well nourished,in no apparent distress  SKIN: no rashes,no suspicious lesions  HEENT: atraumatic, normocephalic,ears and throat are clear, no frontal or maxillary sinus tenderness, pupils equal reactive to light bilaterally, extra ocular muscles intact  NECK: supple,no adenopathy,nontender   LUNGS: clear to auscultation, no wheeze  CARDIO: RRR without murmur  GI: good BS's,no masses or tenderness  EXTREMITIES: no cyanosis, or edema  declined breast exam       /65   Pulse 67   Ht 5' 4\" (1.626 m)   Wt 121 lb 12.8 oz (55.2 kg)   BMI 20.91 kg/m²  Estimated body mass index is 20.91 kg/m² as calculated from the following:    Height as of this encounter: 5' 4\" (1.626 m).    Weight as of this encounter: 121 lb 12.8 oz (55.2 kg).    Medicare Hearing Assessment:   Hearing Screening    Screening Method: Questionnaire  I have a problem hearing over the telephone: No I have trouble following the conversations when two or more people are talking at the same time: No   I have trouble understanding things on the TV: No I have to strain to understand conversations: No   I have to worry about missing the telephone ring or doorbell: No I have trouble hearing conversations in a noisy background such as a crowded room or restaurant: No   I get confused about where sounds come from: No I misunderstand some words in a sentence and need to ask people to repeat themselves: No   I especially have trouble understanding the speech of women and children: No I have trouble  understanding the speaker in a large room such as at a meeting or place of Congregational: No   Many people I talk to seem to mumble (or don't speak clearly): No People get annoyed because I misunderstand what they say: No   I misunderstand what others are saying and make inappropriate responses: No I avoid social activities because I cannot hear well and fear I will reply improperly: No   Family members and friends have told me they think I may have hearing loss: No                   Assessment & Plan:   Penny Roblero is a 78 year old female who presents for a Medicare Assessment.     1. Encounter for annual health examination (Primary)  Advised patient to watch what she eats and exercise, seatbelt use no texting driving, sunscreen use advised    2. Acquired hypothyroidism  Overview:  TSH slightly elevated Sept 2023, but dose difficult to regulate.  Orders:  -     Assay, Thyroid Stim Hormone; Future; Expected date: 08/14/2024  -     Free T4, (Free Thyroxine); Future; Expected date: 08/14/2024  -     Free T3 (Triiodothryronine); Future; Expected date: 08/14/2024  -     Lipid Panel; Future; Expected date: 08/14/2024  Recheck , monitor   3. Hypokalemia  -     Magnesium; Future; Expected date: 08/14/2024  Monitor   4. Essential hypertension  -     hydroCHLOROthiazide; Take 1 tablet (25 mg total) by mouth daily.  Dispense: 90 tablet; Refill: 3  -     Lipid Panel; Future; Expected date: 08/14/2024  Advised pt to follow a low salt , low sodium (including fast foods and processed foods), can look up DASH diet, exercise 30 min a day , monitor bp ,cont meds     5. Atrial tachycardia (HCC)  Overview:  Holter Impressions   - Abnormal study.   - Rhythm is sinus with a mean rate of 75bpm. Range is  bpm.   - Atrial ectopy present less than 0.1%.   - There were 283 atrial runs Length is between 2 and 35 seconds.     and maximum rate of 214bpm.   - Ventricular ectopy present less than 0.1%.   - There were 1 non-sustained ventricular  runs with a maximum     duration of 4 beats and maximum rate of 162bpm.   - Symptoms of palpitations were reported during atrial tachycardia.   Electronically signed       09/30/2022 15:51   Assessment & Plan:  Sees cardiologist   Advised pt to follow a low salt , low sodium (including fast foods and processed foods), can look up DASH diet, exercise 30 min a day , monitor bp   Cont meds , f/u cardiology   6. Mixed hyperlipidemia  -     Lipid Panel; Future; Expected date: 08/14/2024  Advised to follow a low fat low cholesterol diet and exercise with a goal of 30 min a day , cont meds   7. Age-related osteoporosis without current pathological fracture  Overview:  t score was -2.6 in the hip, 9/2018  On reclast , sees endo   8. Vitamin D deficiency  Monitor   9. Cerebrovascular disease  Overview:  Microvascular changes were noted on the MRI of the brain.  I advised pt of the problem and the need to control cardiovascular risk factors including hypercholesterolemia and HTN.    On asa and statin   10. Chronic idiopathic constipation  Stable   11. Gastroesophageal reflux disease, unspecified whether esophagitis present  Stable on meds   12. Primary osteoarthritis involving multiple joints  Stable   13. Overflow incontinence of urine  Stable   14. Anemia, unspecified type  Overview:  Negative panendoscopy 5/20/2022.  Stable , monitor   15. Preglaucoma, unspecified laterality  Stable , f/u ophthalmologist   16. Vitreous floaters, unspecified laterality  Stable , f/u ophthalmologist       Preventative medicine  Mammogram February 2024  Bone density scan June 2024  Colonoscopy, pap -defer  Labs 6/2024 and 3/2023       The patient indicates understanding of these issues and agrees to the plan.  Reinforced healthy diet, lifestyle, and exercise.      Return in 3 months (on 11/14/2024).     Nila Mason MD, 8/14/2024     Supplementary Documentation:   General Health:  In the past six months, have you lost more than 10 pounds  without trying?: 2 - No  Has your appetite been poor?: Yes  Type of Diet: Low Salt  How does the patient maintain a good energy level?: Appropriate Exercise  How would you describe your daily physical activity?: Moderate  How would you describe your current health state?: Fair  How do you maintain positive mental well-being?: Puzzles;Games;Visiting Family  On a scale of 0 to 10, with 0 being no pain and 10 being severe pain, what is your pain level?: 4 - (Moderate)  In the past six months, have you experienced urine leakage?: 1-Yes  At any time do you feel concerned for the safety/well-being of yourself and/or your children, in your home or elsewhere?: No  Have you had any immunizations at another office such as Influenza, Hepatitis B, Tetanus, or Pneumococcal?: Yes    Health Maintenance   Topic Date Due    COVID-19 Vaccine (4 - 2023-24 season) 09/01/2023    MA Annual Health Assessment  01/01/2024    Zoster Vaccines (3 of 3) 09/14/2024 (Originally 7/1/2023)    Influenza Vaccine (1) 10/01/2024    Mammogram  02/15/2025    DEXA Scan  Completed    Annual Depression Screening  Completed    Fall Risk Screening (Annual)  Completed    Pneumococcal Vaccine: 65+ Years  Completed    Colonoscopy  Discontinued

## 2024-08-18 PROBLEM — E87.6 HYPOKALEMIA: Status: ACTIVE | Noted: 2024-08-18

## 2024-08-22 ENCOUNTER — LAB ENCOUNTER (OUTPATIENT)
Dept: LAB | Facility: HOSPITAL | Age: 79
End: 2024-08-22
Attending: NURSE PRACTITIONER
Payer: MEDICARE

## 2024-08-22 DIAGNOSIS — E03.9 ACQUIRED HYPOTHYROIDISM: ICD-10-CM

## 2024-08-22 LAB
T4 FREE SERPL-MCNC: 1.3 NG/DL (ref 0.8–1.7)
TSI SER-ACNC: 3.26 MIU/ML (ref 0.55–4.78)

## 2024-08-22 PROCEDURE — 84443 ASSAY THYROID STIM HORMONE: CPT

## 2024-08-22 PROCEDURE — 36415 COLL VENOUS BLD VENIPUNCTURE: CPT

## 2024-08-22 PROCEDURE — 84439 ASSAY OF FREE THYROXINE: CPT

## 2024-09-06 ENCOUNTER — TELEPHONE (OUTPATIENT)
Dept: ENDOCRINOLOGY CLINIC | Facility: CLINIC | Age: 79
End: 2024-09-06

## 2024-09-06 DIAGNOSIS — M81.0 AGE-RELATED OSTEOPOROSIS WITHOUT CURRENT PATHOLOGICAL FRACTURE: ICD-10-CM

## 2024-09-06 DIAGNOSIS — E03.9 ACQUIRED HYPOTHYROIDISM: Primary | ICD-10-CM

## 2024-09-06 DIAGNOSIS — E55.9 VITAMIN D DEFICIENCY: ICD-10-CM

## 2024-09-06 RX ORDER — LEVOTHYROXINE SODIUM 100 UG/1
100 TABLET ORAL
Qty: 90 TABLET | Refills: 0 | Status: SHIPPED | OUTPATIENT
Start: 2024-09-06 | End: 2024-09-10

## 2024-09-06 NOTE — TELEPHONE ENCOUNTER
----- Message from Nila Mason sent at 9/2/2024  7:23 PM CDT -----  Hi dr lopez, not sure if you saw her thyroid results - I believe in may you decreased the dose but tsh is still low. Do you want me to call her and reduce it more , didn't want to do anything without asking you first

## 2024-09-06 NOTE — TELEPHONE ENCOUNTER
Hi!  Please contact patient and set up follow up appt to discuss DEXA scan results as well as recent TSH results. Please let her know that I would like her to stay on the levothyroxine 125mcg dose and please do not take the alternating 137 and 125mcg as DR. Mason had suggested.     She may have TSH and FT4 as well as other labs completed in 1 month and then we will go over test results and DEXA scan. Thank you!

## 2024-09-09 DIAGNOSIS — E03.9 ACQUIRED HYPOTHYROIDISM: ICD-10-CM

## 2024-09-09 RX ORDER — LEVOTHYROXINE SODIUM 125 UG/1
TABLET ORAL
Qty: 90 TABLET | Refills: 0 | Status: SHIPPED | OUTPATIENT
Start: 2024-09-09

## 2024-09-09 NOTE — TELEPHONE ENCOUNTER
Endocrine refill protocol for medications for hypothyroidism and hyperthyroidism    Protocol Criteria:  PASSED Reason: N/A  Appointment with Endocrinology completed in the last 12 months or scheduled in the next 6 months     Verify appointment has been completed or scheduled in the appropriate timeline. If so can send a 90 day supply with 1 refill per provider protocol.    Normal TSH result in the past 12 months   Review recent telephone encounters and mychart communications with patient to ensure a dose change has not occurred since last office visit that was not updated in the medication history list   Last completed office visit:5/23/2024 Ruby Hunt MD   Next scheduled Follow up: no future appt     Last TSH result:   TSH   Date Value Ref Range Status   08/22/2024 3.258 0.550 - 4.780 mIU/mL Final

## 2024-09-10 NOTE — TELEPHONE ENCOUNTER
Spoke to patient to advise levothyroxine 125mcg daily (no alternating with 137mcg tablets) - patient stated understanding and will repeat labs in one month    Apt scheduled 10/15/24 at 10:30am

## 2024-09-10 NOTE — TELEPHONE ENCOUNTER
Spoke with [facility] pharmacy and levothyroxine 100mcg was already sent out today.     Levothyroxine 125mcg dose was prescribed 9/9/24.     They will send out 125mcg dose.

## 2024-10-01 ENCOUNTER — NURSE TRIAGE (OUTPATIENT)
Dept: INTERNAL MEDICINE CLINIC | Facility: CLINIC | Age: 79
End: 2024-10-01

## 2024-10-01 ENCOUNTER — OFFICE VISIT (OUTPATIENT)
Dept: INTERNAL MEDICINE CLINIC | Facility: CLINIC | Age: 79
End: 2024-10-01

## 2024-10-01 VITALS
WEIGHT: 120 LBS | BODY MASS INDEX: 20.49 KG/M2 | SYSTOLIC BLOOD PRESSURE: 131 MMHG | HEIGHT: 64 IN | HEART RATE: 80 BPM | DIASTOLIC BLOOD PRESSURE: 70 MMHG | RESPIRATION RATE: 16 BRPM

## 2024-10-01 DIAGNOSIS — I10 ESSENTIAL HYPERTENSION: ICD-10-CM

## 2024-10-01 DIAGNOSIS — R53.83 FATIGUE, UNSPECIFIED TYPE: Primary | ICD-10-CM

## 2024-10-01 PROCEDURE — 3075F SYST BP GE 130 - 139MM HG: CPT | Performed by: NURSE PRACTITIONER

## 2024-10-01 PROCEDURE — 3008F BODY MASS INDEX DOCD: CPT | Performed by: NURSE PRACTITIONER

## 2024-10-01 PROCEDURE — 3078F DIAST BP <80 MM HG: CPT | Performed by: NURSE PRACTITIONER

## 2024-10-01 PROCEDURE — 99214 OFFICE O/P EST MOD 30 MIN: CPT | Performed by: NURSE PRACTITIONER

## 2024-10-01 PROCEDURE — 1159F MED LIST DOCD IN RCRD: CPT | Performed by: NURSE PRACTITIONER

## 2024-10-01 PROCEDURE — 1160F RVW MEDS BY RX/DR IN RCRD: CPT | Performed by: NURSE PRACTITIONER

## 2024-10-01 NOTE — PROGRESS NOTES
Penny Roblero is a 78 year old female.  Chief Complaint   Patient presents with    Fatigue     HPI:   She presents with fatigue and weakness. The fatigue started yesterday. She denies any chest pain or SOB.     She had a fever yesterday and today. She states the fever were low grade.   No urine symptoms.     Her BP is elevated in office. She does not check her BP at home.     She is following with cardiology.     Had recent TSH completed which was WNL 8/2024.   Low K in June 2024. Patient is currently on hydrochlorothiazide. Need to recheck.   Current Outpatient Medications   Medication Sig Dispense Refill    levothyroxine 125 MCG Oral Tab TAKE 1 TABLET (125 MCG TOTAL) BY MOUTH BEFORE BREAKFAST. 90 tablet 0    atorvastatin 40 MG Oral Tab Take 2 tablets (80 mg total) by mouth nightly.      hydroCHLOROthiazide 25 MG Oral Tab Take 1 tablet (25 mg total) by mouth daily. 90 tablet 3    dilTIAZem HCl 120 MG Oral Tab Take 1 tablet (120 mg total) by mouth 4 (four) times daily. 360 tablet 0    omeprazole 20 MG Oral Capsule Delayed Release Take 1 capsule (20 mg total) by mouth before breakfast. 90 capsule 3    potassium chloride 10 MEQ Oral Tab CR Take 1 tablet (10 mEq total) by mouth daily. 90 tablet 3    cyanocobalamin 500 MCG Oral Tab Take 1 tablet (500 mcg total) by mouth daily.      aspirin 81 MG Oral Tab EC Take 1 tablet (81 mg total) by mouth daily.      docusate sodium 100 MG Oral Cap Take 100 mg by mouth 2 (two) times daily. Stop if loose stool. 20 capsule 0    multivitamin with minerals Oral Tab Take 1 tablet by mouth daily. 30 tablet 0    Cholecalciferol (VITAMIN D3) 400 units Oral Tab Take by mouth.      Vitamin C 500 MG Oral Tab Take 1 tablet (500 mg total) by mouth daily.      Calcium Carbonate 600 MG Oral Tab 1 tab twice a day with food. (Patient not taking: Reported on 10/1/2024) 60 tablet 0      Past Medical History:    Cataract    Cervical vertebral fusion    Disorder of thyroid    Essential hypertension     High blood pressure    Hyperlipidemia    Hyperthyroidism    Incontinence    Positive FIT (fecal immunochemical test)    05/10/22 EGD w/cold biopsy & Colonoscopy:  Normal EGD, internal hemorrhoids, tortuous colon    Screen for colon cancer    no polyps noted on c-scope    Visual impairment    Readers      Past Surgical History:   Procedure Laterality Date    Back surgery      Carpal tunnel release Left     Cataract Left 3/16/17    Cataract Right 06/2017    Colonoscopy N/A 5/10/2022    Procedure: COLONOSCOPY;  Surgeon: CRISS Fonseca MD;  Location: [facility] ENDOSCOPY    Knee surgery Left 1982    Other surgical history      Bladder nodules removed    Other surgical history      Thyroid nodule removed      Social History:  Social History     Socioeconomic History    Marital status:    Tobacco Use    Smoking status: Former     Current packs/day: 0.50     Types: Cigarettes    Smokeless tobacco: Never   Vaping Use    Vaping status: Never Used   Substance and Sexual Activity    Alcohol use: Yes     Alcohol/week: 1.0 standard drink of alcohol     Types: 1 Glasses of wine per week     Comment: ocassionally    Drug use: Not Currently     Types: Cannabis     Comment: gummies    Sexual activity: Not Currently   Other Topics Concern    Reaction to local anesthetic No      Family History   Problem Relation Age of Onset    Cancer Father     Heart Disorder Mother         Stroke    Cancer Brother     Breast Cancer Maternal Aunt 60    Breast Cancer Maternal Cousin Female 70    Cancer Niece 32        pancreatic ca    Hypertension Sister     Hypertension Sister     Other (Other) Sister         TIM    Diabetes Neg       Allergies   Allergen Reactions    Robaxin [Methocarbamol] ITCHING        REVIEW OF SYSTEMS:     Review of Systems   Constitutional:  Positive for fatigue.   HENT: Negative.     Respiratory:  Negative for cough, shortness of breath and wheezing.    Cardiovascular:  Positive for palpitations. Negative for chest pain.    Gastrointestinal:  Negative for abdominal pain.   Genitourinary: Negative.    Musculoskeletal: Negative.    Skin: Negative.    Neurological:  Positive for dizziness. Negative for headaches.   Psychiatric/Behavioral: Negative.        Wt Readings from Last 5 Encounters:   10/01/24 120 lb (54.4 kg)   08/14/24 121 lb 12.8 oz (55.2 kg)   06/24/24 118 lb (53.5 kg)   05/23/24 127 lb (57.6 kg)   05/23/24 126 lb (57.2 kg)     Body mass index is 20.6 kg/m².      EXAM:   /70 (BP Location: Right arm, Patient Position: Sitting, Cuff Size: adult)   Pulse 80   Resp 16   Ht 5' 4\" (1.626 m)   Wt 120 lb (54.4 kg)   BMI 20.60 kg/m²     Physical Exam  Vitals reviewed.   Constitutional:       Appearance: Normal appearance.   HENT:      Head: Normocephalic.   Cardiovascular:      Rate and Rhythm: Normal rate and regular rhythm.      Pulses: Normal pulses.   Pulmonary:      Breath sounds: Normal breath sounds. No wheezing.   Musculoskeletal:         General: No swelling. Normal range of motion.   Skin:     General: Skin is warm and dry.   Neurological:      Mental Status: She is alert and oriented to person, place, and time.   Psychiatric:         Mood and Affect: Mood normal.         Behavior: Behavior normal.            ASSESSMENT AND PLAN:   1. Fatigue, unspecified type  - Comp Metabolic Panel (14); Future  - CBC With Differential With Platelet; Future  - Magnesium [E]; Future  - Urinalysis with Culture Reflex    2. Essential hypertension  - BP stable in office   - CPM  - Comp Metabolic Panel (14); Future  - CBC With Differential With Platelet; Future  - Magnesium [E]; Future    Total time spent with patient and reviewing the chart: 30 minutes     The patient indicates understanding of these issues and agrees to the plan.  Return for if symptoms do not resolve.

## 2024-10-01 NOTE — TELEPHONE ENCOUNTER
Action Requested: Summary for Provider     []  Critical Lab, Recommendations Needed  [] Need Additional Advice  []   FYI    []   Need Orders  [] Need Medications Sent to Pharmacy  []  Other     SUMMARY: appointment made for today 10/1/24    The patient stated for the past week has been feeling tired and weak.  In the morning when she stands she has to get her balance and is afraid of falling.   She continues to go out, walk, drive.  She is just tired and wishes to be seen.   She has been drinking fluids.       Instructed if feels faint, condition worsens  needs to proceed to the ED/call 911 with understanding.    Reason for call: Fatigue  Onset: Data Unavailable      General Assessment (questions to ask the caller)  Do you consider this a medical emergency?: No  Can you access 911?: Yes  Do you have any pain?: No  Do you have a fever?: No  What is the date of your last medical exam?: 08/14/24  Additional Assessments  Are these symptoms new, recurrent, or chronic?: new (started about 1 week ago)              Reason for Disposition   Patient wants to be seen    Protocols used: Weakness (Generalized) and Fatigue-A-OH

## 2024-10-02 ENCOUNTER — LAB ENCOUNTER (OUTPATIENT)
Dept: LAB | Facility: HOSPITAL | Age: 79
End: 2024-10-02
Attending: INTERNAL MEDICINE
Payer: MEDICARE

## 2024-10-02 DIAGNOSIS — E55.9 VITAMIN D DEFICIENCY: ICD-10-CM

## 2024-10-02 DIAGNOSIS — I10 ESSENTIAL HYPERTENSION: ICD-10-CM

## 2024-10-02 DIAGNOSIS — M81.0 AGE-RELATED OSTEOPOROSIS WITHOUT CURRENT PATHOLOGICAL FRACTURE: ICD-10-CM

## 2024-10-02 DIAGNOSIS — R53.83 FATIGUE, UNSPECIFIED TYPE: ICD-10-CM

## 2024-10-02 DIAGNOSIS — E03.9 ACQUIRED HYPOTHYROIDISM: ICD-10-CM

## 2024-10-02 LAB
ALBUMIN SERPL-MCNC: 4.1 G/DL (ref 3.2–4.8)
ALBUMIN/GLOB SERPL: 1.3 {RATIO} (ref 1–2)
ALP LIVER SERPL-CCNC: 51 U/L
ALT SERPL-CCNC: 9 U/L
ANION GAP SERPL CALC-SCNC: 5 MMOL/L (ref 0–18)
AST SERPL-CCNC: 14 U/L (ref ?–34)
BASOPHILS # BLD AUTO: 0.03 X10(3) UL (ref 0–0.2)
BASOPHILS NFR BLD AUTO: 0.6 %
BILIRUB SERPL-MCNC: 0.9 MG/DL (ref 0.2–1.1)
BILIRUB UR QL: NEGATIVE
BUN BLD-MCNC: 12 MG/DL (ref 9–23)
BUN/CREAT SERPL: 16.7 (ref 10–20)
CALCIUM BLD-MCNC: 9.6 MG/DL (ref 8.7–10.4)
CHLORIDE SERPL-SCNC: 106 MMOL/L (ref 98–112)
CO2 SERPL-SCNC: 30 MMOL/L (ref 21–32)
CREAT BLD-MCNC: 0.72 MG/DL
DEPRECATED RDW RBC AUTO: 46.3 FL (ref 35.1–46.3)
EGFRCR SERPLBLD CKD-EPI 2021: 85 ML/MIN/1.73M2 (ref 60–?)
EOSINOPHIL # BLD AUTO: 0.08 X10(3) UL (ref 0–0.7)
EOSINOPHIL NFR BLD AUTO: 1.5 %
ERYTHROCYTE [DISTWIDTH] IN BLOOD BY AUTOMATED COUNT: 13.1 % (ref 11–15)
FASTING STATUS PATIENT QL REPORTED: YES
GLOBULIN PLAS-MCNC: 3.1 G/DL (ref 2–3.5)
GLUCOSE BLD-MCNC: 92 MG/DL (ref 70–99)
GLUCOSE UR-MCNC: NORMAL MG/DL
HCT VFR BLD AUTO: 34.3 %
HGB BLD-MCNC: 11.2 G/DL
IMM GRANULOCYTES # BLD AUTO: 0.01 X10(3) UL (ref 0–1)
IMM GRANULOCYTES NFR BLD: 0.2 %
KETONES UR-MCNC: NEGATIVE MG/DL
LEUKOCYTE ESTERASE UR QL STRIP.AUTO: 500
LYMPHOCYTES # BLD AUTO: 2.27 X10(3) UL (ref 1–4)
LYMPHOCYTES NFR BLD AUTO: 42.2 %
MAGNESIUM SERPL-MCNC: 1.9 MG/DL (ref 1.6–2.6)
MCH RBC QN AUTO: 31.2 PG (ref 26–34)
MCHC RBC AUTO-ENTMCNC: 32.7 G/DL (ref 31–37)
MCV RBC AUTO: 95.5 FL
MONOCYTES # BLD AUTO: 0.46 X10(3) UL (ref 0.1–1)
MONOCYTES NFR BLD AUTO: 8.6 %
NEUTROPHILS # BLD AUTO: 2.53 X10 (3) UL (ref 1.5–7.7)
NEUTROPHILS # BLD AUTO: 2.53 X10(3) UL (ref 1.5–7.7)
NEUTROPHILS NFR BLD AUTO: 46.9 %
NITRITE UR QL STRIP.AUTO: NEGATIVE
OSMOLALITY SERPL CALC.SUM OF ELEC: 291 MOSM/KG (ref 275–295)
PH UR: 5.5 [PH] (ref 5–8)
PLATELET # BLD AUTO: 265 10(3)UL (ref 150–450)
POTASSIUM SERPL-SCNC: 4.3 MMOL/L (ref 3.5–5.1)
PROT SERPL-MCNC: 7.2 G/DL (ref 5.7–8.2)
PROT UR-MCNC: NEGATIVE MG/DL
PTH-INTACT SERPL-MCNC: 54.1 PG/ML (ref 18.5–88)
RBC # BLD AUTO: 3.59 X10(6)UL
SODIUM SERPL-SCNC: 141 MMOL/L (ref 136–145)
SP GR UR STRIP: 1.01 (ref 1–1.03)
T4 FREE SERPL-MCNC: 1.7 NG/DL (ref 0.8–1.7)
TSI SER-ACNC: 0.01 MIU/ML (ref 0.55–4.78)
UROBILINOGEN UR STRIP-ACNC: NORMAL
VIT D+METAB SERPL-MCNC: 39.7 NG/ML (ref 30–100)
WBC # BLD AUTO: 5.4 X10(3) UL (ref 4–11)
WBC #/AREA URNS AUTO: >50 /HPF

## 2024-10-02 PROCEDURE — 84443 ASSAY THYROID STIM HORMONE: CPT

## 2024-10-02 PROCEDURE — 87086 URINE CULTURE/COLONY COUNT: CPT | Performed by: NURSE PRACTITIONER

## 2024-10-02 PROCEDURE — 85025 COMPLETE CBC W/AUTO DIFF WBC: CPT

## 2024-10-02 PROCEDURE — 36415 COLL VENOUS BLD VENIPUNCTURE: CPT

## 2024-10-02 PROCEDURE — 84080 ASSAY ALKALINE PHOSPHATASES: CPT

## 2024-10-02 PROCEDURE — 84439 ASSAY OF FREE THYROXINE: CPT

## 2024-10-02 PROCEDURE — 83970 ASSAY OF PARATHORMONE: CPT

## 2024-10-02 PROCEDURE — 81001 URINALYSIS AUTO W/SCOPE: CPT | Performed by: NURSE PRACTITIONER

## 2024-10-02 PROCEDURE — 80053 COMPREHEN METABOLIC PANEL: CPT

## 2024-10-02 PROCEDURE — 82306 VITAMIN D 25 HYDROXY: CPT

## 2024-10-02 PROCEDURE — 83735 ASSAY OF MAGNESIUM: CPT

## 2024-10-04 LAB — ALKALINE PHOSPHATASE BONE SPECIFIC: 8.4 UG/L

## 2024-11-20 ENCOUNTER — TELEPHONE (OUTPATIENT)
Dept: INTERNAL MEDICINE CLINIC | Facility: CLINIC | Age: 79
End: 2024-11-20

## 2024-11-20 ENCOUNTER — OFFICE VISIT (OUTPATIENT)
Dept: INTERNAL MEDICINE CLINIC | Facility: CLINIC | Age: 79
End: 2024-11-20

## 2024-11-20 ENCOUNTER — LAB ENCOUNTER (OUTPATIENT)
Dept: LAB | Age: 79
End: 2024-11-20
Attending: INTERNAL MEDICINE
Payer: MEDICARE

## 2024-11-20 VITALS
DIASTOLIC BLOOD PRESSURE: 64 MMHG | BODY MASS INDEX: 20.86 KG/M2 | HEART RATE: 83 BPM | WEIGHT: 122.19 LBS | HEIGHT: 64 IN | SYSTOLIC BLOOD PRESSURE: 114 MMHG

## 2024-11-20 DIAGNOSIS — R10.9 FLANK PAIN: ICD-10-CM

## 2024-11-20 DIAGNOSIS — Z12.31 SCREENING MAMMOGRAM, ENCOUNTER FOR: ICD-10-CM

## 2024-11-20 DIAGNOSIS — I10 ESSENTIAL HYPERTENSION: Primary | ICD-10-CM

## 2024-11-20 DIAGNOSIS — Z23 NEED FOR VACCINATION: ICD-10-CM

## 2024-11-20 LAB
BILIRUB UR QL: NEGATIVE
GLUCOSE UR-MCNC: NORMAL MG/DL
KETONES UR-MCNC: NEGATIVE MG/DL
LEUKOCYTE ESTERASE UR QL STRIP.AUTO: 250
NITRITE UR QL STRIP.AUTO: NEGATIVE
PH UR: 6.5 [PH] (ref 5–8)
PROT UR-MCNC: NEGATIVE MG/DL
SP GR UR STRIP: 1.01 (ref 1–1.03)
UROBILINOGEN UR STRIP-ACNC: NORMAL

## 2024-11-20 PROCEDURE — 90662 IIV NO PRSV INCREASED AG IM: CPT | Performed by: INTERNAL MEDICINE

## 2024-11-20 PROCEDURE — 99214 OFFICE O/P EST MOD 30 MIN: CPT | Performed by: INTERNAL MEDICINE

## 2024-11-20 PROCEDURE — G0008 ADMIN INFLUENZA VIRUS VAC: HCPCS | Performed by: INTERNAL MEDICINE

## 2024-11-20 PROCEDURE — 87086 URINE CULTURE/COLONY COUNT: CPT

## 2024-11-20 PROCEDURE — 81001 URINALYSIS AUTO W/SCOPE: CPT

## 2024-11-20 RX ORDER — DILTIAZEM HYDROCHLORIDE 120 MG/1
120 TABLET, FILM COATED ORAL DAILY
COMMUNITY
Start: 2024-11-20

## 2024-11-20 RX ORDER — NITROFURANTOIN 25; 75 MG/1; MG/1
100 CAPSULE ORAL 2 TIMES DAILY
Qty: 14 CAPSULE | Refills: 0 | Status: SHIPPED | OUTPATIENT
Start: 2024-11-20

## 2024-11-20 RX ORDER — NITROFURANTOIN 25; 75 MG/1; MG/1
100 CAPSULE ORAL 2 TIMES DAILY
Qty: 14 CAPSULE | Refills: 0 | Status: SHIPPED | OUTPATIENT
Start: 2024-11-20 | End: 2024-11-20

## 2024-11-20 NOTE — PROGRESS NOTES
Penny Roblero is a 79 year old female.  Chief Complaint   Patient presents with    Follow - Up     3 month    Low Back Pain       HPI:   Urgent presenting  C/C back pain x 3 days   C/o right flank pain for 3 days, review of systems detailed below without any urinary complaints  She did bring in her urine sample and a pill bottle that was washed because it is difficult for her to give her urine  Usually when this happens she takes a pain pill and it goes away so she took a pain pill last night and it did help--tylenol   Today pain is 4/10   No falls trauma or injury that she is aware of  Last month she was seen by the nurse practitioner for fatigue but believes that that is due to stress-sister who is older than her was sick lives in [place] and she was there taking care of her   Thyroid is being adjusted by the endocrinologist      Later she saids that she fell about many years ago in the tub and this pain comes and goes since then    HISTORY  8/2024   New patient  C/C establish care-used to see Dr. Thomas  C/o here for her Medicare physical  Overall doing well per pt   Just came back from visiting her sister who had a stroke in [place]-was there for 1- 2 month  Went through all her meds               PMH  Hypothyroidism-sees endo    HTN  Hypokalemia   GERD   HL  OP     Syl lopez      Lives alone (grandson living with her), retired factory work   -----------------------------------------------------------------------------------------------------------------    Current Outpatient Medications   Medication Sig Dispense Refill    dilTIAZem HCl 120 MG Oral Tab Take 1 tablet (120 mg total) by mouth daily.      levothyroxine 125 MCG Oral Tab TAKE 1 TABLET (125 MCG TOTAL) BY MOUTH BEFORE BREAKFAST. 90 tablet 0    atorvastatin 40 MG Oral Tab Take 2 tablets (80 mg total) by mouth nightly.      hydroCHLOROthiazide 25 MG Oral Tab Take 1 tablet (25 mg total) by mouth daily. 90 tablet 3     omeprazole 20 MG Oral Capsule Delayed Release Take 1 capsule (20 mg total) by mouth before breakfast. 90 capsule 3    potassium chloride 10 MEQ Oral Tab CR Take 1 tablet (10 mEq total) by mouth daily. 90 tablet 3    cyanocobalamin 500 MCG Oral Tab Take 1 tablet (500 mcg total) by mouth daily.      aspirin 81 MG Oral Tab EC Take 1 tablet (81 mg total) by mouth daily.      docusate sodium 100 MG Oral Cap Take 100 mg by mouth 2 (two) times daily. Stop if loose stool. 20 capsule 0    multivitamin with minerals Oral Tab Take 1 tablet by mouth daily. 30 tablet 0    Calcium Carbonate 600 MG Oral Tab 1 tab twice a day with food. 60 tablet 0    Cholecalciferol (VITAMIN D3) 400 units Oral Tab Take by mouth.      Vitamin C 500 MG Oral Tab Take 1 tablet (500 mg total) by mouth daily.        Past Medical History:    Cataract    Cervical vertebral fusion    Disorder of thyroid    Essential hypertension    High blood pressure    Hyperlipidemia    Hyperthyroidism    Incontinence    Positive FIT (fecal immunochemical test)    05/10/22 EGD w/cold biopsy & Colonoscopy:  Normal EGD, internal hemorrhoids, tortuous colon    Screen for colon cancer    no polyps noted on c-scope    Visual impairment    Readers      Past Surgical History:   Procedure Laterality Date    Back surgery      Carpal tunnel release Left     Cataract Left 3/16/17    Cataract Right 06/2017    Colonoscopy N/A 5/10/2022    Procedure: COLONOSCOPY;  Surgeon: CRISS Fonseca MD;  Location: [facility] ENDOSCOPY    Knee surgery Left 1982    Other surgical history      Bladder nodules removed    Other surgical history      Thyroid nodule removed      Social History:  Social History     Socioeconomic History    Marital status:    Tobacco Use    Smoking status: Former     Current packs/day: 0.50     Types: Cigarettes    Smokeless tobacco: Never   Vaping Use    Vaping status: Never Used   Substance and Sexual Activity    Alcohol use: Yes     Alcohol/week: 1.0 standard drink  of alcohol     Types: 1 Glasses of wine per week     Comment: ocassionally    Drug use: Not Currently     Types: Cannabis     Comment: gummies    Sexual activity: Not Currently   Other Topics Concern    Reaction to local anesthetic No        REVIEW OF SYSTEMS:   GENERAL HEALTH: No fevers, chills, sweats, fatigue  VISION: No recent vision problems, blurry vision or double vision  HEENT: No decreased hearing ear pain nasal congestion or sore throat  SKIN: denies any unusual skin lesions or rashes  RESPIRATORY: denies shortness of breath, cough, wheezing  CARDIOVASCULAR: denies chest pain on exertion, palpitations, swelling in feet  GI:+abdominal pain - more o nthe left side and denies heartburn, nausea or vomiting  : No Pain on urination, change in the color of urine, discharge, urinating frequently urgency or hesitancy  MUS: +  back pain-right flank, no other joint pain or muscle pain  NEURO: denies headaches , anxiety, depression    EXAM:   /64   Pulse 83   Ht 5' 4\" (1.626 m)   Wt 122 lb 3.2 oz (55.4 kg)   BMI 20.98 kg/m²   GENERAL: well developed, well nourished,in no apparent distress  SKIN:  no redness or swelling in the region of her pain where she points left lower rib area, no rashes seen  HEENT: atraumatic, normocephalic  NECK: supple,no adenopathy,nontender   LUNGS: clear to auscultation, no wheeze  CARDIO: RRR without murmur  GI: good BS's,no masses or tenderness  EXTREMITIES: no cyanosis, or edema    ASSESSMENT AND PLAN:   Diagnoses and all orders for this visit:    Essential hypertension  Advised pt to follow a low salt , low sodium (including fast foods and processed foods), can look up DASH diet, exercise / stay active 30 min a day , monitor bp ,cont meds       Need for vaccination  -     INFLUENZA VAC HIGH DOSE PRSV FREE  Today   Screening mammogram, encounter for  -     [facility] MARIMAR 2D+3D SCREENING BILAT (CPT=77067/81807); Future  Ordered   Flank pain  -     Urinalysis with Culture Reflex;  Future    Will check urine, continue with the Tylenol and monitor       Preventative medicine  Mammogram February 2024  Bone density scan June 2024  Colonoscopy, pap -defer  Labs 6/2024 and  8/2024 10/2024    The patient indicates understanding of these issues and agrees to the plan.  No follow-ups on file.

## 2024-11-20 NOTE — TELEPHONE ENCOUNTER
----- Message from April R sent at 11/20/2024  3:47 PM CST -----      ----- Message -----  From: Grace Ashraf CMA  Sent: 11/20/2024   3:31 PM CST  To: Em Rn Triage      ----- Message -----  From: Nila Mason MD  Sent: 11/20/2024   3:30 PM CST  To: Em Triage Support    Results reviewed and will be sent to patient by my chart  Please let patient know that I sent antibiotics to her pharmacy on file

## 2024-11-21 DIAGNOSIS — E55.9 VITAMIN D DEFICIENCY: Primary | ICD-10-CM

## 2024-11-21 DIAGNOSIS — E03.9 ACQUIRED HYPOTHYROIDISM: ICD-10-CM

## 2024-11-21 NOTE — TELEPHONE ENCOUNTER
Endocrine refill protocol for medications for hypothyroidism and hyperthyroidism    Protocol Criteria:  FAILED Reason: Abnormal labs    If all below requirements are met, send a 90-day supply with 1 refill per provider protocol.    Verify appointment with Endocrinology completed in the last 12 months or scheduled in the next 6 months.    Normal TSH result in the past 12 months   Review recent telephone encounters and mychart communications with patient to ensure a dose change has not occurred since last office visit that was not updated in the medication history list     Last completed office visit:5/23/2024 Ruby Hunt MD   Next scheduled Follow up:   Future Appointments   Date Time Provider Department Center   2/17/2025 11:20 AM [facility] RM2 [facility] EM [facility]   2/24/2025  1:40 PM Nila Mason MD [facility]      Last TSH result:   TSH   Date Value Ref Range Status   10/02/2024 0.011 (L) 0.550 - 4.780 mIU/mL Final

## 2024-12-03 RX ORDER — LEVOTHYROXINE SODIUM 125 UG/1
TABLET ORAL
Qty: 90 TABLET | Refills: 1 | OUTPATIENT
Start: 2024-12-03

## 2024-12-03 RX ORDER — LEVOTHYROXINE SODIUM 112 UG/1
112 TABLET ORAL
Qty: 90 TABLET | Refills: 1 | Status: SHIPPED | OUTPATIENT
Start: 2024-12-03 | End: 2025-02-24

## 2024-12-03 NOTE — TELEPHONE ENCOUNTER
Hi!  Please let patient know that she should see me in follow up for hypothyroidism as well as osteoporosis. I have decreased the dose of her levothyroxine to 112mcg based upon her labs taken in October and I would like her to have repeat labs in 3 months and I would like to see her in follow up at that time. Thank you!

## 2025-01-08 RX ORDER — POTASSIUM CHLORIDE 750 MG/1
10 TABLET, EXTENDED RELEASE ORAL DAILY
Qty: 90 TABLET | Refills: 3 | Status: SHIPPED | OUTPATIENT
Start: 2025-01-08

## 2025-01-08 NOTE — TELEPHONE ENCOUNTER
Review pended refill request as it does not fall under a protocol.    Last Rx: 03/19/24    Requested Prescriptions   Pending Prescriptions Disp Refills    POTASSIUM CHLORIDE 10 MEQ Oral Tab CR [Pharmacy Med Name: Potassium Chloride Franca ER Oral Tablet Extended Release 10 MEQ] 90 tablet 3     Sig: TAKE 1 TABLET EVERY DAY       There is no refill protocol information for this order

## 2025-02-21 ENCOUNTER — TELEPHONE (OUTPATIENT)
Dept: INTERNAL MEDICINE CLINIC | Facility: CLINIC | Age: 80
End: 2025-02-21

## 2025-02-21 DIAGNOSIS — I47.19 ATRIAL TACHYCARDIA (HCC): Primary | ICD-10-CM

## 2025-02-21 NOTE — TELEPHONE ENCOUNTER
Patient is requesting referral.     Name of specialist and specialty department : Dr. Schafer, cardiology  Reason for visit with the specialist: heart f/u  Address of the specialist office: [address] [place] [address]  Appointment date: 02/24/25         CSS informed patient the turnaround time for referral is 5-7 business days.  Patient was informed to check their [employer] account for referral status.

## 2025-02-21 NOTE — TELEPHONE ENCOUNTER
Dr. Mason,     Patient called requesting referral to Dr. Aguilera.     Pended referral please review diagnosis and sign off if you agree.    Thank you.  Fay Alfaro  [facility]

## 2025-02-24 ENCOUNTER — LAB ENCOUNTER (OUTPATIENT)
Dept: LAB | Age: 80
End: 2025-02-24
Attending: INTERNAL MEDICINE
Payer: MEDICARE

## 2025-02-24 ENCOUNTER — OFFICE VISIT (OUTPATIENT)
Dept: INTERNAL MEDICINE CLINIC | Facility: CLINIC | Age: 80
End: 2025-02-24

## 2025-02-24 VITALS
WEIGHT: 120 LBS | OXYGEN SATURATION: 98 % | SYSTOLIC BLOOD PRESSURE: 123 MMHG | HEIGHT: 64 IN | DIASTOLIC BLOOD PRESSURE: 71 MMHG | HEART RATE: 70 BPM | TEMPERATURE: 97 F | BODY MASS INDEX: 20.49 KG/M2

## 2025-02-24 DIAGNOSIS — I10 ESSENTIAL HYPERTENSION: ICD-10-CM

## 2025-02-24 DIAGNOSIS — E78.2 MIXED HYPERLIPIDEMIA: ICD-10-CM

## 2025-02-24 DIAGNOSIS — R41.3 MEMORY CHANGE: ICD-10-CM

## 2025-02-24 DIAGNOSIS — Z11.3 SCREENING EXAMINATION FOR STD (SEXUALLY TRANSMITTED DISEASE): ICD-10-CM

## 2025-02-24 DIAGNOSIS — E03.9 ACQUIRED HYPOTHYROIDISM: Primary | ICD-10-CM

## 2025-02-24 DIAGNOSIS — E87.6 HYPOKALEMIA: ICD-10-CM

## 2025-02-24 DIAGNOSIS — M81.0 AGE-RELATED OSTEOPOROSIS WITHOUT CURRENT PATHOLOGICAL FRACTURE: ICD-10-CM

## 2025-02-24 DIAGNOSIS — D64.9 ANEMIA, UNSPECIFIED TYPE: ICD-10-CM

## 2025-02-24 DIAGNOSIS — E03.9 ACQUIRED HYPOTHYROIDISM: ICD-10-CM

## 2025-02-24 DIAGNOSIS — E55.9 VITAMIN D DEFICIENCY: ICD-10-CM

## 2025-02-24 LAB
ALBUMIN SERPL-MCNC: 4.9 G/DL (ref 3.2–4.8)
ALBUMIN/GLOB SERPL: 1.5 {RATIO} (ref 1–2)
ALP LIVER SERPL-CCNC: 51 U/L
ALT SERPL-CCNC: 7 U/L
ANION GAP SERPL CALC-SCNC: 9 MMOL/L (ref 0–18)
AST SERPL-CCNC: 18 U/L (ref ?–34)
BILIRUB SERPL-MCNC: 1.9 MG/DL (ref 0.2–1.1)
BUN BLD-MCNC: 11 MG/DL (ref 9–23)
BUN/CREAT SERPL: 11.7 (ref 10–20)
CALCIUM BLD-MCNC: 10.1 MG/DL (ref 8.7–10.4)
CHLORIDE SERPL-SCNC: 94 MMOL/L (ref 98–112)
CO2 SERPL-SCNC: 35 MMOL/L (ref 21–32)
CREAT BLD-MCNC: 0.94 MG/DL
DEPRECATED HBV CORE AB SER IA-ACNC: 63 NG/ML
EGFRCR SERPLBLD CKD-EPI 2021: 61 ML/MIN/1.73M2 (ref 60–?)
FASTING STATUS PATIENT QL REPORTED: YES
FOLATE SERPL-MCNC: 19.3 NG/ML (ref 5.4–?)
GLOBULIN PLAS-MCNC: 3.3 G/DL (ref 2–3.5)
GLUCOSE BLD-MCNC: 86 MG/DL (ref 70–99)
IRON SATN MFR SERPL: 27 %
IRON SERPL-MCNC: 90 UG/DL
OSMOLALITY SERPL CALC.SUM OF ELEC: 285 MOSM/KG (ref 275–295)
POTASSIUM SERPL-SCNC: 3.1 MMOL/L (ref 3.5–5.1)
PROT SERPL-MCNC: 8.2 G/DL (ref 5.7–8.2)
SODIUM SERPL-SCNC: 138 MMOL/L (ref 136–145)
T PALLIDUM AB SER QL IA: NONREACTIVE
T4 FREE SERPL-MCNC: 1.2 NG/DL (ref 0.8–1.7)
TOTAL IRON BINDING CAPACITY: 328 UG/DL (ref 250–425)
TRANSFERRIN SERPL-MCNC: 262 MG/DL (ref 250–380)
TSI SER-ACNC: 25.69 UIU/ML (ref 0.55–4.78)
VIT B12 SERPL-MCNC: 1162 PG/ML (ref 211–911)
VIT D+METAB SERPL-MCNC: 34.2 NG/ML (ref 30–100)

## 2025-02-24 PROCEDURE — 82746 ASSAY OF FOLIC ACID SERUM: CPT

## 2025-02-24 PROCEDURE — 3074F SYST BP LT 130 MM HG: CPT | Performed by: INTERNAL MEDICINE

## 2025-02-24 PROCEDURE — 1160F RVW MEDS BY RX/DR IN RCRD: CPT | Performed by: INTERNAL MEDICINE

## 2025-02-24 PROCEDURE — 3078F DIAST BP <80 MM HG: CPT | Performed by: INTERNAL MEDICINE

## 2025-02-24 PROCEDURE — 83540 ASSAY OF IRON: CPT

## 2025-02-24 PROCEDURE — 3008F BODY MASS INDEX DOCD: CPT | Performed by: INTERNAL MEDICINE

## 2025-02-24 PROCEDURE — 1159F MED LIST DOCD IN RCRD: CPT | Performed by: INTERNAL MEDICINE

## 2025-02-24 PROCEDURE — G2211 COMPLEX E/M VISIT ADD ON: HCPCS | Performed by: INTERNAL MEDICINE

## 2025-02-24 PROCEDURE — 84439 ASSAY OF FREE THYROXINE: CPT

## 2025-02-24 PROCEDURE — 84443 ASSAY THYROID STIM HORMONE: CPT

## 2025-02-24 PROCEDURE — 87389 HIV-1 AG W/HIV-1&-2 AB AG IA: CPT

## 2025-02-24 PROCEDURE — 82728 ASSAY OF FERRITIN: CPT

## 2025-02-24 PROCEDURE — 80053 COMPREHEN METABOLIC PANEL: CPT

## 2025-02-24 PROCEDURE — 84466 ASSAY OF TRANSFERRIN: CPT

## 2025-02-24 PROCEDURE — 82607 VITAMIN B-12: CPT

## 2025-02-24 PROCEDURE — 99214 OFFICE O/P EST MOD 30 MIN: CPT | Performed by: INTERNAL MEDICINE

## 2025-02-24 PROCEDURE — 82306 VITAMIN D 25 HYDROXY: CPT

## 2025-02-24 PROCEDURE — 36415 COLL VENOUS BLD VENIPUNCTURE: CPT

## 2025-02-24 PROCEDURE — 86780 TREPONEMA PALLIDUM: CPT

## 2025-02-24 NOTE — PROGRESS NOTES
Penny Roblero is a 79 year old female.  Chief Complaint   Patient presents with    Follow - Up     3 month        HPI:   Pt comes for follow up  C/o follow up  C/o overall doing well  and will go for her mammogram today    she is doing word puzzles as she feels that her memory is decreasing    HISTORY  Last month she was seen by the nurse practitioner for fatigue but believes that that is due to stress-sister who is older than her was sick lives in [place] and she was there taking care of her   Thyroid is being adjusted by the endocrinologist        Later she saids that she fell about many years ago in the tub and this pain comes and goes since then     HISTORY  8/2024   New patient  C/C establish care-used to see Dr. 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      levothyroxine 125 MCG Oral Tab TAKE 1 TABLET (125 MCG TOTAL) BY MOUTH BEFORE BREAKFAST. 90 tablet 0    atorvastatin 40 MG Oral Tab Take 2 tablets (80 mg total) by mouth nightly.      hydroCHLOROthiazide 25 MG Oral Tab Take 1 tablet (25 mg total) by mouth daily. 90 tablet 3    omeprazole 20 MG Oral Capsule Delayed Release Take 1 capsule (20 mg total) by mouth before breakfast. 90 capsule 3    cyanocobalamin 500 MCG Oral Tab Take 1 tablet (500 mcg total) by mouth daily.      aspirin 81 MG  Oral Tab EC Take 1 tablet (81 mg total) by mouth daily.      docusate sodium 100 MG Oral Cap Take 100 mg by mouth 2 (two) times daily. Stop if loose stool. 20 capsule 0    multivitamin with minerals Oral Tab Take 1 tablet by mouth daily. 30 tablet 0    Calcium Carbonate 600 MG Oral Tab 1 tab twice a day with food. 60 tablet 0    Cholecalciferol (VITAMIN D3) 400 units Oral Tab Take by mouth.      Vitamin C 500 MG Oral Tab Take 1 tablet (500 mg total) by mouth daily.        Past Medical History:    Cataract    Cervical vertebral fusion    Disorder of thyroid    Essential hypertension    High blood pressure    Hyperlipidemia    Hyperthyroidism    Incontinence    Positive FIT (fecal immunochemical test)    05/10/22 EGD w/cold biopsy & Colonoscopy:  Normal EGD, internal hemorrhoids, tortuous colon    Screen for colon cancer    no polyps noted on c-scope    Visual impairment    Readers      Past Surgical History:   Procedure Laterality Date    Back surgery      Carpal tunnel release Left     Cataract Left 3/16/17    Cataract Right 06/2017    Colonoscopy N/A 5/10/2022    Procedure: COLONOSCOPY;  Surgeon: CRISS Fonseca MD;  Location: [facility] ENDOSCOPY    Knee surgery Left 1982    Other surgical history      Bladder nodules removed    Other surgical history      Thyroid nodule removed      Social History:  Social History     Socioeconomic History    Marital status:    Tobacco Use    Smoking status: Former     Current packs/day: 0.50     Types: Cigarettes    Smokeless tobacco: Never   Vaping Use    Vaping status: Never Used   Substance and Sexual Activity    Alcohol use: Yes     Alcohol/week: 1.0 standard drink of alcohol     Types: 1 Glasses of wine per week     Comment: ocassionally    Drug use: Not Currently     Types: Cannabis     Comment: gummies    Sexual activity: Not Currently   Other Topics Concern    Reaction to local anesthetic No        REVIEW OF SYSTEMS:   GENERAL HEALTH: No fevers, chills, sweats,  fatigue  VISION: No recent vision problems, blurry vision or double vision- due to see the eye    RESPIRATORY: denies shortness of breath, cough, wheezing  CARDIOVASCULAR: denies chest pain on exertion, palpitations, swelling in feet -to see the heart doctor and missed an appointment because she did not have a referral but now referral is placed and she has an appointment to see him March 17    NEURO: denies headaches , anxiety, depression    EXAM:   /71   Pulse 70   Temp 97.2 °F (36.2 °C)   Ht 5' 4\" (1.626 m)   Wt 120 lb (54.4 kg)   SpO2 98%   BMI 20.60 kg/m²   GENERAL: well developed, well nourished,in no apparent distress  SKIN: no rashes,no suspicious lesions  HEENT: atraumatic, normocephalic  LUNGS: clear to auscultation, no wheeze  CARDIO: RRR without murmur  GI: good BS's,no masses or tenderness  EXTREMITIES: no cyanosis, or edema    ASSESSMENT AND PLAN:   Diagnoses and all orders for this visit:    Acquired hypothyroidism  Continue medications and follow-up with Endo  Mixed hyperlipidemia  Follow low-fat low-cholesterol diet and focus more on diet as exercise may be difficult for her, continue medications, not sure that she is taking the 2 pills to equal 80 mg, she may be only taking the 40 mg-advised patient to take 2 and gave a printout of her medication list  Essential hypertension  Advised pt to follow a low salt , low sodium (including fast foods and processed foods), can look up DASH diet, exercise 30 min a day , monitor bp   Cont meds   Memory change  -     Neuro Referral - In Network  -     T Pallidum Screening [place]; Future  -     HIV Ag/Ab Combo; Future  -     Vitamin B12; Future  -     Folic Acid Serum (Folate); Future  And   Screening examination for STD (sexually transmitted disease)  -     T Pallidum Screening [place]; Future  -     HIV Ag/Ab Combo; Future  -     Vitamin B12; Future  -     Folic Acid Serum (Folate); Future    Check reversible causes of dementia and refer to  neurology      Preventative medicine  Mammogram February 2024, today   Bone density scan June 2024  Colonoscopy, pap -defer  Labs 6/2024 and  8/2024 10/2024    The patient indicates understanding of these issues and agrees to the plan.  No follow-ups on file.

## 2025-02-26 ENCOUNTER — TELEPHONE (OUTPATIENT)
Dept: INTERNAL MEDICINE CLINIC | Facility: CLINIC | Age: 80
End: 2025-02-26

## 2025-03-13 ENCOUNTER — OFFICE VISIT (OUTPATIENT)
Dept: INTERNAL MEDICINE CLINIC | Facility: CLINIC | Age: 80
End: 2025-03-13
Payer: MEDICARE

## 2025-03-13 VITALS
SYSTOLIC BLOOD PRESSURE: 114 MMHG | OXYGEN SATURATION: 96 % | HEART RATE: 71 BPM | BODY MASS INDEX: 21 KG/M2 | DIASTOLIC BLOOD PRESSURE: 75 MMHG | WEIGHT: 123 LBS | HEIGHT: 64 IN

## 2025-03-13 DIAGNOSIS — R52 PAIN OF LEFT SIDE OF BODY: Primary | ICD-10-CM

## 2025-03-13 DIAGNOSIS — H53.9 CHANGE IN VISION: ICD-10-CM

## 2025-03-13 PROCEDURE — 1160F RVW MEDS BY RX/DR IN RCRD: CPT

## 2025-03-13 PROCEDURE — 1159F MED LIST DOCD IN RCRD: CPT

## 2025-03-13 PROCEDURE — 3008F BODY MASS INDEX DOCD: CPT

## 2025-03-13 PROCEDURE — 99213 OFFICE O/P EST LOW 20 MIN: CPT

## 2025-03-13 PROCEDURE — 3074F SYST BP LT 130 MM HG: CPT

## 2025-03-13 PROCEDURE — 1125F AMNT PAIN NOTED PAIN PRSNT: CPT

## 2025-03-13 PROCEDURE — 3078F DIAST BP <80 MM HG: CPT

## 2025-03-13 NOTE — PROGRESS NOTES
Subjective:   Penyn Roblero is a 79 year old female who presents for Flank Pain     Presents with c/c pain in her left side for about 3 days   No inciting incident or fall  It is present constantly but worse when she is getting into bed  A long time ago she fell in the bathtub but this is a brand new pain   No nausea, stomach cramping, diarrhea  She reports constipation, had a BM yesterday but it was firm   Low appetite reports chronic   No urinary frequency or burning or abnormal color  Last month her urine was dark brown but it is now clear to yellow    History/Other:    Chief Complaint Reviewed and Verified  No Further Nursing Notes to   Review  Tobacco Reviewed  Allergies Reviewed  Medications Reviewed    Problem List Reviewed  Medical History Reviewed  Surgical History   Reviewed  OB Status Reviewed  Family History Reviewed         Tobacco:  She smoked tobacco in the past but quit greater than 12 months ago.  Social History     Tobacco Use   Smoking Status Former    Current packs/day: 0.50    Types: Cigarettes   Smokeless Tobacco Never        Current Outpatient Medications   Medication Sig Dispense Refill    POTASSIUM CHLORIDE 10 MEQ Oral Tab CR TAKE 1 TABLET EVERY DAY 90 tablet 3    dilTIAZem HCl 120 MG Oral Tab Take 1 tablet (120 mg total) by mouth daily.      levothyroxine 125 MCG Oral Tab TAKE 1 TABLET (125 MCG TOTAL) BY MOUTH BEFORE BREAKFAST. 90 tablet 0    atorvastatin 40 MG Oral Tab Take 2 tablets (80 mg total) by mouth nightly.      hydroCHLOROthiazide 25 MG Oral Tab Take 1 tablet (25 mg total) by mouth daily. 90 tablet 3    omeprazole 20 MG Oral Capsule Delayed Release Take 1 capsule (20 mg total) by mouth before breakfast. 90 capsule 3    cyanocobalamin 500 MCG Oral Tab Take 1 tablet (500 mcg total) by mouth daily.      aspirin 81 MG Oral Tab EC Take 1 tablet (81 mg total) by mouth daily.      docusate sodium 100 MG Oral Cap Take 100 mg by mouth 2 (two) times daily. Stop if loose stool. 20  capsule 0    multivitamin with minerals Oral Tab Take 1 tablet by mouth daily. 30 tablet 0    Calcium Carbonate 600 MG Oral Tab 1 tab twice a day with food. 60 tablet 0    Cholecalciferol (VITAMIN D3) 400 units Oral Tab Take by mouth.      Vitamin C 500 MG Oral Tab Take 1 tablet (500 mg total) by mouth daily.       Review of Systems:  Review of Systems  10 point review of systems otherwise negative with the exception of HPI and assessment and plan    Objective:   /75   Pulse 71   Ht 5' 4\" (1.626 m)   Wt 123 lb (55.8 kg)   SpO2 96%   BMI 21.11 kg/m²  Estimated body mass index is 21.11 kg/m² as calculated from the following:    Height as of this encounter: 5' 4\" (1.626 m).    Weight as of this encounter: 123 lb (55.8 kg).  Physical Exam  Vitals reviewed.   Constitutional:       General: She is not in acute distress.     Appearance: Normal appearance. She is well-developed.   Cardiovascular:      Rate and Rhythm: Normal rate and regular rhythm.      Heart sounds: Normal heart sounds.   Pulmonary:      Effort: Pulmonary effort is normal.      Breath sounds: Normal breath sounds.   Chest:      Chest wall: No tenderness.   Abdominal:      General: Bowel sounds are normal.      Palpations: Abdomen is soft.      Tenderness: There is no right CVA tenderness, left CVA tenderness, guarding or rebound.   Skin:     General: Skin is warm and dry.   Neurological:      Mental Status: She is alert and oriented to person, place, and time.       Assessment & Plan:   1. Pain of left side of body (Primary)  2. Change in vision  -     Ophthalmology Referral - In Network    You do not have any signs of a severe condition today. I suspect the cause of pain to be musculoskeletal.  Take tylenol 500mg every 6 hours   Take ibuprofen 400-600mg two or three times a day WITH FOOD   Do this for no longer than a week   If symptoms worsen, severe pain, vomiting, dizziness, or other severe symptom- please seek care at Urgent Care or ER      Key Vargas, AMIRAH, 3/13/2025, 10:19 AM

## 2025-03-13 NOTE — PATIENT INSTRUCTIONS
You do not have any signs of a severe condition today. I suspect the cause of pain to be musculoskeletal.  Take tylenol 500mg every 6 hours   Take ibuprofen 400-600mg two or three times a day WITH FOOD   Do this for no longer than a week   If symptoms worsen, severe pain, vomiting, dizziness, or other severe symptom- please seek care at Urgent Care or ER

## 2025-03-20 ENCOUNTER — TELEPHONE (OUTPATIENT)
Dept: ENDOCRINOLOGY CLINIC | Facility: CLINIC | Age: 80
End: 2025-03-20

## 2025-03-20 DIAGNOSIS — E03.9 ACQUIRED HYPOTHYROIDISM: Primary | ICD-10-CM

## 2025-03-20 NOTE — TELEPHONE ENCOUNTER
Please call patient - I was reviewing labs for Dr. MCWILLIAMS and noted significant TSH elevation on her labs.  Is she taking her levothyroxine or missed any doses?  Normal Vitamin D level.

## 2025-03-25 NOTE — TELEPHONE ENCOUNTER
, Called and spoke to patient regarding her Levothyroxine. Patient confirms she has been taking Levothyroxine 125 mcg po daily before breakfast.    On chart review, it looks like a my chart message was sent to patient on 12/3/24 to decrease Levothyroxine to 112 mcg. Patient reports she did not get the message and she was in [place] for 2 months taking care of her sick sister.  See TE dated 11/21/24.  Thank you.

## 2025-03-26 NOTE — TELEPHONE ENCOUNTER
Ok, noted.  Given the alteration in labs lets recheck her levels to determine plan.  Recheck TSH, FT4 at her convenience. Thanks.

## 2025-03-28 NOTE — TELEPHONE ENCOUNTER
Called and spoke to patient, informed her Dr. Bales would like you to have your TSH and FT4 done at your convenience. Patient  verbalized understanding and will have her blood work done.

## 2025-04-02 ENCOUNTER — MED REC SCAN ONLY (OUTPATIENT)
Dept: INTERNAL MEDICINE CLINIC | Facility: CLINIC | Age: 80
End: 2025-04-02

## 2025-04-02 DIAGNOSIS — E78.00 HYPERCHOLESTEROLEMIA: ICD-10-CM

## 2025-04-04 RX ORDER — ATORVASTATIN CALCIUM 40 MG/1
40 TABLET, FILM COATED ORAL NIGHTLY
Qty: 90 TABLET | Refills: 3 | OUTPATIENT
Start: 2025-04-04

## 2025-04-04 RX ORDER — ATORVASTATIN CALCIUM 40 MG/1
40 TABLET, FILM COATED ORAL NIGHTLY
Qty: 90 TABLET | Refills: 3 | Status: SHIPPED | OUTPATIENT
Start: 2025-04-04

## 2025-04-04 NOTE — TELEPHONE ENCOUNTER
Office visit with Cardiology Dr. Elias Aguilera on 03/17/2025:    Problem #1 hyperlipidemia  Continue atorvastatin 40 mg daily.

## 2025-04-04 NOTE — TELEPHONE ENCOUNTER
Please review; protocol failed/No Protocol      Has not been written by Dr. Nila Mason- last written by Dr. Burroughs- please advise on refill

## 2025-04-15 NOTE — PROGRESS NOTES
"Patient: Bell Ruano \"Estefany\"    Procedure Summary       Date: 04/15/25 [facility]    Anesthesia Start: 0830 Anesthesia Stop: 1056    Procedure: Right Hip Total Arthroplasty ~ Posterior Approach (Right: Hip) Diagnosis:       Primary osteoarthritis of right hip      (Primary osteoarthritis of right hip [M16.11])    Surgeons: Zaki Coleman MD Responsible Provider: Ca Hinkle MD    Anesthesia Type: spinal ASA Status: 3            Anesthesia Type: spinal    Vitals Value Taken Time   /60 04/15/25 1239   Temp 36.2 °C (97.2 °F) 04/15/25 1200   Pulse 80 04/15/25 1243   Resp 10 04/15/25 1243   SpO2 98 % 04/15/25 1243   Vitals shown include unfiled device data.    Anesthesia Post Evaluation    Patient location during evaluation: PACU  Patient participation: complete - patient participated  Level of consciousness: awake and alert  Pain management: adequate  Airway patency: patent  Cardiovascular status: acceptable and hemodynamically stable  Respiratory status: acceptable and spontaneous ventilation  Hydration status: acceptable  Postoperative Nausea and Vomiting: none        No notable events documented.    " Notified at appointment

## 2025-04-30 RX ORDER — LEVOTHYROXINE SODIUM 112 UG/1
112 TABLET ORAL
Qty: 90 TABLET | Refills: 1 | Status: SHIPPED | OUTPATIENT
Start: 2025-04-30

## 2025-04-30 NOTE — TELEPHONE ENCOUNTER
Endocrine refill protocol for medications for hypothyroidism and hyperthyroidism    Protocol Criteria:  FAILED Reason: No Visit in required time frame and Abnormal labs    If all below requirements are met, send a 90-day supply with 1 refill per provider protocol.    Verify appointment with Endocrinology completed in the last 12 months or scheduled in the next 6 months.    Normal TSH result in the past 12 months   Review recent telephone encounters and mychart communications with patient to ensure a dose change has not occurred since last office visit that was not updated in the medication history list     Last completed office visit:5/23/2024 Ruby Hunt MD   Next scheduled Follow up: Sent Pt [facility] Message to schedule appointment  Future Appointments   Date Time Provider Department Center   6/11/2025 10:40 AM Nila Mason MD [facility]      Last TSH result:   TSH   Date Value Ref Range Status   02/24/2025 25.691 (H) 0.550 - 4.780 uIU/mL Final

## 2025-05-06 NOTE — TELEPHONE ENCOUNTER
Pt calling requesting if she can have earlier appt for ER follow up with Dr earlier than first available of 10/11. Please advise if pt can be seen sooner. none

## 2025-06-10 ENCOUNTER — TELEPHONE (OUTPATIENT)
Dept: INTERNAL MEDICINE CLINIC | Facility: CLINIC | Age: 80
End: 2025-06-10

## 2025-06-10 NOTE — TELEPHONE ENCOUNTER
Reached patient; introduced self and program. She is unable to review medications with me over the phone today. Did instruct patient to bring medication bottles into appointment tomorrow for review.

## 2025-06-11 ENCOUNTER — OFFICE VISIT (OUTPATIENT)
Dept: INTERNAL MEDICINE CLINIC | Facility: CLINIC | Age: 80
End: 2025-06-11

## 2025-06-11 ENCOUNTER — LAB ENCOUNTER (OUTPATIENT)
Dept: LAB | Age: 80
End: 2025-06-11
Attending: INTERNAL MEDICINE
Payer: MEDICARE

## 2025-06-11 VITALS
OXYGEN SATURATION: 97 % | HEIGHT: 64 IN | SYSTOLIC BLOOD PRESSURE: 133 MMHG | DIASTOLIC BLOOD PRESSURE: 71 MMHG | HEART RATE: 73 BPM | BODY MASS INDEX: 21.17 KG/M2 | WEIGHT: 124 LBS

## 2025-06-11 DIAGNOSIS — I47.19 ATRIAL TACHYCARDIA (HCC): ICD-10-CM

## 2025-06-11 DIAGNOSIS — K59.04 CHRONIC IDIOPATHIC CONSTIPATION: ICD-10-CM

## 2025-06-11 DIAGNOSIS — M15.0 PRIMARY OSTEOARTHRITIS INVOLVING MULTIPLE JOINTS: ICD-10-CM

## 2025-06-11 DIAGNOSIS — E03.9 ACQUIRED HYPOTHYROIDISM: ICD-10-CM

## 2025-06-11 DIAGNOSIS — Z00.00 ENCOUNTER FOR ANNUAL HEALTH EXAMINATION: ICD-10-CM

## 2025-06-11 DIAGNOSIS — K21.9 GASTROESOPHAGEAL REFLUX DISEASE, UNSPECIFIED WHETHER ESOPHAGITIS PRESENT: ICD-10-CM

## 2025-06-11 DIAGNOSIS — E78.2 MIXED HYPERLIPIDEMIA: ICD-10-CM

## 2025-06-11 DIAGNOSIS — Z00.00 ENCOUNTER FOR ANNUAL HEALTH EXAMINATION: Primary | ICD-10-CM

## 2025-06-11 DIAGNOSIS — M81.0 AGE-RELATED OSTEOPOROSIS WITHOUT CURRENT PATHOLOGICAL FRACTURE: ICD-10-CM

## 2025-06-11 DIAGNOSIS — E87.6 HYPOKALEMIA: ICD-10-CM

## 2025-06-11 DIAGNOSIS — I67.9 CEREBROVASCULAR DISEASE: ICD-10-CM

## 2025-06-11 DIAGNOSIS — E55.9 VITAMIN D DEFICIENCY: ICD-10-CM

## 2025-06-11 DIAGNOSIS — N39.490 OVERFLOW INCONTINENCE OF URINE: ICD-10-CM

## 2025-06-11 DIAGNOSIS — I10 ESSENTIAL HYPERTENSION: ICD-10-CM

## 2025-06-11 DIAGNOSIS — D64.9 ANEMIA, UNSPECIFIED TYPE: ICD-10-CM

## 2025-06-11 PROBLEM — H43.399 VITREOUS FLOATERS: Status: RESOLVED | Noted: 2023-02-21 | Resolved: 2025-06-11

## 2025-06-11 PROBLEM — H40.009 PREGLAUCOMA: Status: RESOLVED | Noted: 2019-11-19 | Resolved: 2025-06-11

## 2025-06-11 LAB
ALBUMIN SERPL-MCNC: 4.7 G/DL (ref 3.2–4.8)
ALBUMIN/GLOB SERPL: 1.6 {RATIO} (ref 1–2)
ALP LIVER SERPL-CCNC: 52 U/L (ref 55–142)
ALT SERPL-CCNC: 7 U/L (ref 10–49)
ANION GAP SERPL CALC-SCNC: 9 MMOL/L (ref 0–18)
AST SERPL-CCNC: 17 U/L (ref ?–34)
BILIRUB SERPL-MCNC: 1.6 MG/DL (ref 0.2–1.1)
BUN BLD-MCNC: 13 MG/DL (ref 9–23)
BUN/CREAT SERPL: 15.7 (ref 10–20)
CALCIUM BLD-MCNC: 9.8 MG/DL (ref 8.7–10.4)
CHLORIDE SERPL-SCNC: 100 MMOL/L (ref 98–112)
CHOLEST SERPL-MCNC: 221 MG/DL (ref ?–200)
CO2 SERPL-SCNC: 32 MMOL/L (ref 21–32)
CREAT BLD-MCNC: 0.83 MG/DL (ref 0.55–1.02)
DEPRECATED RDW RBC AUTO: 46.6 FL (ref 35.1–46.3)
EGFRCR SERPLBLD CKD-EPI 2021: 71 ML/MIN/1.73M2 (ref 60–?)
ERYTHROCYTE [DISTWIDTH] IN BLOOD BY AUTOMATED COUNT: 13.2 % (ref 11–15)
FASTING PATIENT LIPID ANSWER: YES
FASTING STATUS PATIENT QL REPORTED: YES
GLOBULIN PLAS-MCNC: 2.9 G/DL (ref 2–3.5)
GLUCOSE BLD-MCNC: 90 MG/DL (ref 70–99)
HCT VFR BLD AUTO: 35.6 % (ref 35–48)
HDLC SERPL-MCNC: 88 MG/DL (ref 40–59)
HGB BLD-MCNC: 11.2 G/DL (ref 12–16)
LDLC SERPL CALC-MCNC: 121 MG/DL (ref ?–100)
MCH RBC QN AUTO: 30.3 PG (ref 26–34)
MCHC RBC AUTO-ENTMCNC: 31.5 G/DL (ref 31–37)
MCV RBC AUTO: 96.2 FL (ref 80–100)
NONHDLC SERPL-MCNC: 133 MG/DL (ref ?–130)
OSMOLALITY SERPL CALC.SUM OF ELEC: 292 MOSM/KG (ref 275–295)
PLATELET # BLD AUTO: 264 10(3)UL (ref 150–450)
POTASSIUM SERPL-SCNC: 4 MMOL/L (ref 3.5–5.1)
PROT SERPL-MCNC: 7.6 G/DL (ref 5.7–8.2)
RBC # BLD AUTO: 3.7 X10(6)UL (ref 3.8–5.3)
SODIUM SERPL-SCNC: 141 MMOL/L (ref 136–145)
TRIGL SERPL-MCNC: 68 MG/DL (ref 30–149)
VLDLC SERPL CALC-MCNC: 12 MG/DL (ref 0–30)
WBC # BLD AUTO: 5.1 X10(3) UL (ref 4–11)

## 2025-06-11 PROCEDURE — 80061 LIPID PANEL: CPT

## 2025-06-11 PROCEDURE — 36415 COLL VENOUS BLD VENIPUNCTURE: CPT

## 2025-06-11 PROCEDURE — 80053 COMPREHEN METABOLIC PANEL: CPT

## 2025-06-11 PROCEDURE — 85027 COMPLETE CBC AUTOMATED: CPT

## 2025-06-11 RX ORDER — HYDROCHLOROTHIAZIDE 12.5 MG/1
12.5 TABLET ORAL DAILY
Qty: 90 TABLET | Refills: 3 | Status: SHIPPED | OUTPATIENT
Start: 2025-06-11

## 2025-06-11 NOTE — PROGRESS NOTES
Subjective:   Penny Roblero is a 79 year old female who presents for a MA AHA (Medicare Advantage Annual Health Assessment) and Medicare Subsequent Annual Wellness visit (Pt already had Initial Annual Wellness) and scheduled follow up of multiple significant but stable problems.           Patient comes for her Medicare physical with her sister from [facility] (SHE HAS ANOTHER SISTER BARBI)   She did bring all the bottles of medications that she is taking and noted that diltiazem 120 is not on there and she has 2 bottles of the levothyroxine 125 and 112 but I believe that she is on the 112 so we will continue with that and monitor.  Noted that her atorvastatin is only written as 40 mg but updated it to 80  Didn't do mammo or see neuro since last visit   Still has to see the eye dr                   HISTORY   she is doing word puzzles as she feels that her memory is decreasing     HISTORY  Last month she was seen by the nurse practitioner for fatigue but believes that that is due to stress-sister who is older than her was sick lives in [place] and she was there taking care of her   Thyroid is being adjusted by the endocrinologist        Later she saids that she fell about many years ago in the tub and this pain comes and goes since then     HISTORY  8/2024   New patient  C/C establish care-used to see Dr. Thomas  C/o here for her Medicare physical  Overall doing well per pt   Just came back from visiting her sister who had a stroke in [place]-was there for 1- 2 month  Went through all her meds               PMH  Hypothyroidism-sees endo    HTN  Hypokalemia   GERD   HL  OP     Syl lopez      Lives alone (grandson living with her), retired factory work   -----------------------------------------------------------------------------------------------------------------    History/Other:   Fall Risk Assessment:   She has been screened for Falls and is low risk.      Cognitive  Assessment:   Abnormal  What day of the week is this?: Correct  What month is it?: Incorrect  What year is it?: Incorrect  Recall \"Ball\": Correct  Recall \"Flag\": Incorrect  Recall \"Tree\": Incorrect    Functional Ability/Status:   Penny Roblero has some abnormal functions as listed below:  She has difficulties Affording Meds based on screening of functional status. She has problems with Memory based on screening of functional status.       Depression Screening (PHQ):  PHQ-2 SCORE: 0  , done 6/11/2025             Advanced Directives:   She does NOT have a Living Will. [Do you have a living will?: Yes]    She does NOT have a Power of  for Health Care. [Do you have a healthcare power of ?: Yes]    Discussed Advance Care Planning with patient (and family/surrogate if present). Standard forms made available to patient in After Visit Summary.      Patient Active Problem List   Diagnosis    Essential hypertension    Acquired hypothyroidism    Hyperlipidemia    Cerebrovascular disease    Age-related osteoporosis without current pathological fracture    Primary osteoarthritis involving multiple joints    Chronic idiopathic constipation    Anemia    Overflow incontinence of urine    Atrial tachycardia (HCC)    Gastroesophageal reflux disease    Vitamin D deficiency    Hypokalemia     Allergies:  She is allergic to robaxin [methocarbamol].    Current Medications:  Outpatient Medications Marked as Taking for the 6/11/25 encounter (Office Visit) with Nila Mason MD   Medication Sig    hydroCHLOROthiazide 12.5 MG Oral Tab Take 1 tablet (12.5 mg total) by mouth daily.    POTASSIUM CHLORIDE 10 MEQ Oral Tab CR TAKE 1 TABLET EVERY DAY    dilTIAZem HCl 120 MG Oral Tab Take 1 tablet (120 mg total) by mouth daily.    cyanocobalamin 500 MCG Oral Tab Take 1 tablet (500 mcg total) by mouth daily.    aspirin 81 MG Oral Tab EC Take 1 tablet (81 mg total) by mouth daily.    docusate sodium 100 MG Oral Cap Take 100 mg by  mouth 2 (two) times daily. Stop if loose stool.    multivitamin with minerals Oral Tab Take 1 tablet by mouth daily.    Calcium Carbonate 600 MG Oral Tab 1 tab twice a day with food.    Cholecalciferol (VITAMIN D3) 400 units Oral Tab Take by mouth.    Vitamin C 500 MG Oral Tab Take 1 tablet (500 mg total) by mouth daily.       Medical History:  She  has a past medical history of Cataract, Cervical vertebral fusion (10/2/2014), Disorder of thyroid, Essential hypertension, High blood pressure, Hyperlipidemia, Hyperthyroidism, Incontinence, Positive FIT (fecal immunochemical test) (10/11/2022), Screen for colon cancer (2022), and Visual impairment.  Surgical History:  She  has a past surgical history that includes back surgery; other surgical history; other surgical history; cataract (Left, 3/16/17); cataract (Right, 06/2017); knee surgery (Left, 1982); colonoscopy (N/A, 5/10/2022); and carpal tunnel release (Left).   Family History:  Her family history includes Breast Cancer (age of onset: 60) in her maternal aunt; Breast Cancer (age of onset: 70) in her maternal cousin female; Cancer in her brother and father; Cancer (age of onset: 32) in her niece; Heart Disorder in her mother; Hypertension in her sister and sister; Other in her sister.  Social History:  She  reports that she has quit smoking. Her smoking use included cigarettes. She has never used smokeless tobacco. She reports current alcohol use of about 1.0 standard drink of alcohol per week. She reports that she does not currently use drugs after having used the following drugs: Cannabis.    Tobacco:  She smoked tobacco in the past but quit greater than 12 months ago.  Tobacco Use[1]     CAGE Alcohol Screen:   CAGE screening score of 0 on 6/11/2025, showing low risk of alcohol abuse.      Patient Care Team:  Nila Mason MD as PCP - General (Internal Medicine)  Julia Liz DPM as Consulting Physician (PODIATRIST)    Review of Systems  GENERAL:  feels well otherwise  SKIN: denies any unusual skin lesions  EYES: denies blurred vision or double vision  HEENT: denies nasal congestion, sinus pain or ST  LUNGS: denies shortness of breath with exertion  CARDIOVASCULAR: denies chest pain on exertion  GI: denies abdominal pain, denies heartburn  : denies dysuria, vaginal discharge or itching, +  complaint of urinary incontinence-wears diapers    MUSCULOSKELETAL: denies back pain  NEURO: denies headaches  PSYCHE: denies depression or anxiety  HEMATOLOGIC: + hx of anemia  ENDOCRINE: denies thyroid history  ALL/ASTHMA: + hx of allergy or asthma    Objective:   Physical Exam  GENERAL: well developed, well nourished,in no apparent distress  SKIN: no rashes,no suspicious lesions  HEENT: atraumatic, normocephalic,ears and throat are clear, no frontal or maxillary sinus tenderness, pupils equal reactive to light bilaterally, extraocular muscles intact  NECK: supple,no adenopathy,nontender   LUNGS: clear to auscultation, no wheeze  CARDIO: RRR without murmur  GI: good BS's,no masses or tenderness  EXTREMITIES: no cyanosis, or edema  BREAST: Bilateral breasts without any lumps, bilateral axilla without any lymphadenopathy, no nipple retraction or  discharge        /71   Pulse 73   Ht 5' 4\" (1.626 m)   Wt 124 lb (56.2 kg)   SpO2 97%   BMI 21.28 kg/m²  Estimated body mass index is 21.28 kg/m² as calculated from the following:    Height as of this encounter: 5' 4\" (1.626 m).    Weight as of this encounter: 124 lb (56.2 kg).    Medicare Hearing Assessment:   Hearing Screening    Screening Method: Questionnaire  I have a problem hearing over the telephone: No I have trouble following the conversations when two or more people are talking at the same time: No   I have trouble understanding things on the TV: No I have to strain to understand conversations: No   I have to worry about missing the telephone ring or doorbell: No I have trouble hearing conversations in a  noisy background such as a crowded room or restaurant: No   I get confused about where sounds come from: No I misunderstand some words in a sentence and need to ask people to repeat themselves: No   I especially have trouble understanding the speech of women and children: No I have trouble understanding the speaker in a large room such as at a meeting or place of [employer]: No   Many people I talk to seem to mumble (or don't speak clearly): No People get annoyed because I misunderstand what they say: No   I misunderstand what others are saying and make inappropriate responses: No I avoid social activities because I cannot hear well and fear I will reply improperly: No   Family members and friends have told me they think I may have hearing loss: No                   Assessment & Plan:   Penny Roblero is a 79 year old female who presents for a Medicare Assessment.     1. Encounter for annual health examination (Primary)  -     Comp Metabolic Panel (14); Future; Expected date: 06/11/2025  -     CBC, Platelet; No Differential; Future; Expected date: 06/11/2025  -     Lipid Panel; Future; Expected date: 06/11/2025  Advised patient to watch what she eats and exercise, seatbelt use no texting driving, sunscreen use advised   2. Essential hypertension  -     hydroCHLOROthiazide; Take 1 tablet (12.5 mg total) by mouth daily.  Dispense: 90 tablet; Refill: 3  -     Comp Metabolic Panel (14); Future; Expected date: 06/11/2025  -     CBC, Platelet; No Differential; Future; Expected date: 06/11/2025  -     Lipid Panel; Future; Expected date: 06/11/2025  Advised pt to follow a low salt , low sodium (including fast foods and processed foods), can look up DASH diet, exercise 30 min a day , monitor bp ,cont meds   3. Mixed hyperlipidemia  -     Comp Metabolic Panel (14); Future; Expected date: 06/11/2025  -     CBC, Platelet; No Differential; Future; Expected date: 06/11/2025  -     Lipid Panel; Future; Expected date:  06/11/2025  Advised to follow a low fat low cholesterol diet and exercise with a goal of 30 min a day , cont meds   4. Acquired hypothyroidism  Overview:  TSH slightly elevated Sept 2023, but dose difficult to regulate.  Recheck , monitor   5. Age-related osteoporosis without current pathological fracture  Overview:  t score was -2.6 in the hip, 9/2018  On reclast , sees endo   6. Vitamin D deficiency  Monitor   7. Cerebrovascular disease  Overview:  Microvascular changes were noted on the MRI of the brain.  I advised pt of the problem and the need to control cardiovascular risk factors including hypercholesterolemia and HTN.    Monitor   8. Chronic idiopathic constipation  Monitor , stable   9. Gastroesophageal reflux disease, unspecified whether esophagitis present  Stable on meds   10. Primary osteoarthritis involving multiple joints  Stable     11. Hypokalemia  Monitor   12. Overflow incontinence of urine  Monitor , stable   13. Anemia, unspecified type  Overview:  Negative panendoscopy 5/20/2022.  Stable , monitor   14. Atrial tachycardia (HCC)  Overview:  Holter Impressions   - Abnormal study.   - Rhythm is sinus with a mean rate of 75bpm. Range is  bpm.   - Atrial ectopy present less than 0.1%.   - There were 283 atrial runs Length is between 2 and 35 seconds.     and maximum rate of 214bpm.   - Ventricular ectopy present less than 0.1%.   - There were 1 non-sustained ventricular runs with a maximum     duration of 4 beats and maximum rate of 162bpm.   - Symptoms of palpitations were reported during atrial tachycardia.   Electronically signed       09/30/2022 15:51     When printed med list was shown to patient, she states that she is taking the diltiazem and it is a green pill and she has at home    Preventative medicine  Mammogram February 2024, today   Bone density scan June 2024  Colonoscopy, pap -defer  Labs 2/2025  and  8/2024 10/2024      The patient indicates understanding of these issues and  agrees to the plan.  Reinforced healthy diet, lifestyle, and exercise.      Return in 3 months (on 9/11/2025).     Nila Mason MD, 6/11/2025     Supplementary Documentation:   General Health:  In the past six months, have you lost more than 10 pounds without trying?: 2 - No  Has your appetite been poor?: No  Type of Diet: Low Salt  How does the patient maintain a good energy level?: Appropriate Exercise, Other  How would you describe your daily physical activity?: Light  How would you describe your current health state?: Good  How do you maintain positive mental well-being?: Visiting Family  On a scale of 0 to 10, with 0 being no pain and 10 being severe pain, what is your pain level?: 0 - (None)  In the past six months, have you experienced urine leakage?: 1-Yes  At any time do you feel concerned for the safety/well-being of yourself and/or your children, in your home or elsewhere?: No  Have you had any immunizations at another office such as Influenza, Hepatitis B, Tetanus, or Pneumococcal?: Yes    Health Maintenance   Topic Date Due    COVID-19 Vaccine (4 - 2024-25 season) 09/01/2024    Annual Well Visit  01/01/2025    Mammogram  02/15/2025    Zoster Vaccines (3 of 3) 12/20/2025 (Originally 7/1/2023)    Influenza Vaccine  Completed    DEXA Scan  Completed    Annual Depression Screening  Completed    Fall Risk Screening (Annual)  Completed    Pneumococcal Vaccine: 50+ Years  Completed    Meningococcal B Vaccine  Aged Out    Colonoscopy  Discontinued          [1]   Social History  Tobacco Use   Smoking Status Former    Current packs/day: 0.50    Types: Cigarettes   Smokeless Tobacco Never

## 2025-06-17 ENCOUNTER — APPOINTMENT (OUTPATIENT)
Dept: CT IMAGING | Facility: HOSPITAL | Age: 80
End: 2025-06-17
Attending: EMERGENCY MEDICINE
Payer: MEDICARE

## 2025-06-17 ENCOUNTER — HOSPITAL ENCOUNTER (EMERGENCY)
Facility: HOSPITAL | Age: 80
Discharge: HOME | End: 2025-06-17
Attending: EMERGENCY MEDICINE
Payer: MEDICARE

## 2025-06-17 ENCOUNTER — APPOINTMENT (OUTPATIENT)
Dept: GENERAL RADIOLOGY | Facility: HOSPITAL | Age: 80
End: 2025-06-17
Attending: EMERGENCY MEDICINE
Payer: MEDICARE

## 2025-06-17 VITALS
TEMPERATURE: 97 F | OXYGEN SATURATION: 98 % | DIASTOLIC BLOOD PRESSURE: 76 MMHG | RESPIRATION RATE: 13 BRPM | HEART RATE: 58 BPM | SYSTOLIC BLOOD PRESSURE: 141 MMHG

## 2025-06-17 DIAGNOSIS — R10.9 ABDOMINAL PAIN OF UNKNOWN ETIOLOGY: Primary | ICD-10-CM

## 2025-06-17 LAB
ALBUMIN SERPL-MCNC: 4.9 G/DL (ref 3.2–4.8)
ALBUMIN/GLOB SERPL: 1.7 {RATIO} (ref 1–2)
ALP LIVER SERPL-CCNC: 52 U/L (ref 55–142)
ALT SERPL-CCNC: 8 U/L (ref 10–49)
ANION GAP SERPL CALC-SCNC: 9 MMOL/L (ref 0–18)
AST SERPL-CCNC: 21 U/L (ref ?–34)
ATRIAL RATE: 69 BPM
BASOPHILS # BLD AUTO: 0.03 X10(3) UL (ref 0–0.2)
BASOPHILS NFR BLD AUTO: 0.5 %
BILIRUB SERPL-MCNC: 1.6 MG/DL (ref 0.2–1.1)
BUN BLD-MCNC: 11 MG/DL (ref 9–23)
BUN/CREAT SERPL: 12.4 (ref 10–20)
CALCIUM BLD-MCNC: 10.2 MG/DL (ref 8.7–10.4)
CHLORIDE SERPL-SCNC: 99 MMOL/L (ref 98–112)
CO2 SERPL-SCNC: 31 MMOL/L (ref 21–32)
CREAT BLD-MCNC: 0.89 MG/DL (ref 0.55–1.02)
DEPRECATED RDW RBC AUTO: 45.1 FL (ref 35.1–46.3)
EGFRCR SERPLBLD CKD-EPI 2021: 65 ML/MIN/1.73M2 (ref 60–?)
EOSINOPHIL # BLD AUTO: 0.04 X10(3) UL (ref 0–0.7)
EOSINOPHIL NFR BLD AUTO: 0.7 %
ERYTHROCYTE [DISTWIDTH] IN BLOOD BY AUTOMATED COUNT: 13.2 % (ref 11–15)
GLOBULIN PLAS-MCNC: 2.9 G/DL (ref 2–3.5)
GLUCOSE BLD-MCNC: 91 MG/DL (ref 70–99)
HCT VFR BLD AUTO: 35.7 % (ref 35–48)
HGB BLD-MCNC: 12 G/DL (ref 12–16)
IMM GRANULOCYTES # BLD AUTO: 0.01 X10(3) UL (ref 0–1)
IMM GRANULOCYTES NFR BLD: 0.2 %
LIPASE SERPL-CCNC: 38 U/L (ref 12–53)
LYMPHOCYTES # BLD AUTO: 1.81 X10(3) UL (ref 1–4)
LYMPHOCYTES NFR BLD AUTO: 32.8 %
MCH RBC QN AUTO: 31.3 PG (ref 26–34)
MCHC RBC AUTO-ENTMCNC: 33.6 G/DL (ref 31–37)
MCV RBC AUTO: 93.2 FL (ref 80–100)
MONOCYTES # BLD AUTO: 0.42 X10(3) UL (ref 0.1–1)
MONOCYTES NFR BLD AUTO: 7.6 %
NEUTROPHILS # BLD AUTO: 3.2 X10 (3) UL (ref 1.5–7.7)
NEUTROPHILS # BLD AUTO: 3.2 X10(3) UL (ref 1.5–7.7)
NEUTROPHILS NFR BLD AUTO: 58.2 %
OSMOLALITY SERPL CALC.SUM OF ELEC: 287 MOSM/KG (ref 275–295)
P AXIS: 67 DEGREES
P-R INTERVAL: 164 MS
PLATELET # BLD AUTO: 283 10(3)UL (ref 150–450)
POTASSIUM SERPL-SCNC: 3.9 MMOL/L (ref 3.5–5.1)
PROT SERPL-MCNC: 7.8 G/DL (ref 5.7–8.2)
Q-T INTERVAL: 410 MS
QRS DURATION: 70 MS
QTC CALCULATION (BEZET): 439 MS
R AXIS: 21 DEGREES
RBC # BLD AUTO: 3.83 X10(6)UL (ref 3.8–5.3)
SODIUM SERPL-SCNC: 139 MMOL/L (ref 136–145)
T AXIS: 46 DEGREES
TROPONIN I SERPL HS-MCNC: <3 NG/L (ref ?–34)
VENTRICULAR RATE: 69 BPM
WBC # BLD AUTO: 5.5 X10(3) UL (ref 4–11)

## 2025-06-17 PROCEDURE — 71045 X-RAY EXAM CHEST 1 VIEW: CPT | Performed by: EMERGENCY MEDICINE

## 2025-06-17 PROCEDURE — 85025 COMPLETE CBC W/AUTO DIFF WBC: CPT | Performed by: EMERGENCY MEDICINE

## 2025-06-17 PROCEDURE — 84484 ASSAY OF TROPONIN QUANT: CPT | Performed by: EMERGENCY MEDICINE

## 2025-06-17 PROCEDURE — 74177 CT ABD & PELVIS W/CONTRAST: CPT | Performed by: EMERGENCY MEDICINE

## 2025-06-17 PROCEDURE — 99285 EMERGENCY DEPT VISIT HI MDM: CPT

## 2025-06-17 PROCEDURE — 93005 ELECTROCARDIOGRAM TRACING: CPT

## 2025-06-17 PROCEDURE — 83690 ASSAY OF LIPASE: CPT | Performed by: EMERGENCY MEDICINE

## 2025-06-17 PROCEDURE — 93010 ELECTROCARDIOGRAM REPORT: CPT

## 2025-06-17 PROCEDURE — 96374 THER/PROPH/DIAG INJ IV PUSH: CPT

## 2025-06-17 PROCEDURE — 80053 COMPREHEN METABOLIC PANEL: CPT | Performed by: EMERGENCY MEDICINE

## 2025-06-17 RX ORDER — PANTOPRAZOLE SODIUM 40 MG/1
40 TABLET, DELAYED RELEASE ORAL DAILY
Qty: 30 TABLET | Refills: 0 | Status: SHIPPED | OUTPATIENT
Start: 2025-06-17 | End: 2025-07-17

## 2025-06-17 RX ORDER — POLYETHYLENE GLYCOL 3350 17 G/17G
17 POWDER, FOR SOLUTION ORAL DAILY PRN
Qty: 12 EACH | Refills: 0 | Status: SHIPPED | OUTPATIENT
Start: 2025-06-17 | End: 2025-07-17

## 2025-06-17 NOTE — ED INITIAL ASSESSMENT (HPI)
C/o non-radiating CP since last night. Reports OTC medications did not help with pain. Reports she sees Dr. Price for aflutter. Denies any dizziness or sob.

## 2025-06-17 NOTE — ED PROVIDER NOTES
Patient Seen in: [facility] Emergency Department        History  Chief Complaint   Patient presents with    Chest Pain Angina     Stated Complaint: CP    Subjective:   HPI            79-year-old female with hypertension, hypothyroidism, GERD, hyperlipidemia, who presents for evaluation of abdominal pain.  She developed abdominal pain yesterday evening.  Is located in the epigastrium and radiates towards the center of the abdomen.  No associated nausea or vomiting, fever or chills, cough or congestion, diarrhea.  No blood in the stools.  She does admit to constipation.  She reports she has had this multiple times in the past without clear etiology.  Her PCP recently ordered CT abdomen pelvis for evaluation but she has not yet had the schedule.      Objective:     Past Medical History:    Cataract    Cervical vertebral fusion    Disorder of thyroid    Essential hypertension    High blood pressure    Hyperlipidemia    Hyperthyroidism    Incontinence    Positive FIT (fecal immunochemical test)    05/10/22 EGD w/cold biopsy & Colonoscopy:  Normal EGD, internal hemorrhoids, tortuous colon    Screen for colon cancer    no polyps noted on c-scope    Visual impairment    Readers              Past Surgical History:   Procedure Laterality Date    Back surgery      Carpal tunnel release Left     Cataract Left 3/16/17    Cataract Right 06/2017    Colonoscopy N/A 5/10/2022    Procedure: COLONOSCOPY;  Surgeon: CRISS Fonseca MD;  Location: [facility] ENDOSCOPY    Knee surgery Left 1982    Other surgical history      Bladder nodules removed    Other surgical history      Thyroid nodule removed                Social History     Socioeconomic History    Marital status:    Tobacco Use    Smoking status: Former     Current packs/day: 0.50     Types: Cigarettes    Smokeless tobacco: Never   Vaping Use    Vaping status: Never Used   Substance and Sexual Activity    Alcohol use: Yes     Alcohol/week: 1.0 standard drink of  alcohol     Types: 1 Glasses of wine per week     Comment: ocassionally    Drug use: Not Currently     Types: Cannabis     Comment: gummies    Sexual activity: Not Currently   Other Topics Concern    Reaction to local anesthetic No                                Physical Exam    ED Triage Vitals [06/17/25 0956]   /71   Pulse 69   Resp 18   Temp 97.2 °F (36.2 °C)   Temp src Temporal   SpO2 97 %   O2 Device None (Room air)       Current Vitals:   Vital Signs  BP: 141/76  Pulse: 58  Resp: 13  Temp: 97.2 °F (36.2 °C)  Temp src: Temporal  MAP (mmHg): 97    Oxygen Therapy  SpO2: 98 %  O2 Device: None (Room air)            Physical Exam  Vitals and nursing note reviewed.   Constitutional:       Appearance: She is well-developed.   HENT:      Head: Normocephalic and atraumatic.   Eyes:      Extraocular Movements: Extraocular movements intact.   Cardiovascular:      Rate and Rhythm: Normal rate and regular rhythm.      Heart sounds: Normal heart sounds.   Pulmonary:      Effort: Pulmonary effort is normal.      Breath sounds: Normal breath sounds.   Abdominal:      General: There is no distension.      Palpations: Abdomen is soft.      Tenderness: There is abdominal tenderness.      Comments: Tender to palpation in the epigastrium   Musculoskeletal:         General: Normal range of motion.      Cervical back: Normal range of motion.      Right lower leg: No edema.      Left lower leg: No edema.   Skin:     General: Skin is warm.   Neurological:      General: No focal deficit present.      Mental Status: She is alert.      Comments: No focal deficits       Differential diagnosis includes but is not limited to gastritis, GERD, PUD, pancreatitis, cholelithiasis, cholecystitis, ACS/MI, less likely dissection        ED Course  Labs Reviewed   COMP METABOLIC PANEL (14) - Abnormal; Notable for the following components:       Result Value    ALT 8 (*)     Alkaline Phosphatase 52 (*)     Bilirubin, Total 1.6 (*)     Albumin 4.9  (*)     All other components within normal limits   TROPONIN I HIGH SENSITIVITY - Normal   LIPASE - Normal   CBC WITH DIFFERENTIAL WITH PLATELET   RAINBOW DRAW LAVENDER   RAINBOW DRAW LIGHT GREEN   RAINBOW DRAW BLUE     EKG    Rate, intervals and axes as noted on EKG Report.  Rate: 69  Rhythm: Sinus Rhythm  Reading: No STEMI, no ectopy           ED Course as of 06/17/25 1644  ------------------------------------------------------------  Time: 06/17 1155  Comment: CBC, LFTs, renal function, lipase and troponin unremarkable  ------------------------------------------------------------  Time: 06/17 1155  Comment: Total bilirubin 1.6; LFTs normal     CT ABDOMEN+PELVIS(CONTRAST ONLY)(CPT=74177)  Result Date: 6/17/2025  CONCLUSION:   1. Mild left hydronephrosis is again seen.  No obstructing renal or ureteral stone is identified.  No evidence of obstructive uropathy on this exam.  2. Large colonic stool burden which may indicate constipation.  No colitis or appendicitis.  3. Additional chronic or incidental findings are described in the body of this report.     Dictated by ([facility]): Shiva Cedeño MD on 6/17/2025 at 1:14 PM     Finalized by ([facility]): Shiva Cedeño MD on 6/17/2025 at 1:16 PM          XR CHEST AP PORTABLE  (CPT=71045)  Result Date: 6/17/2025  CONCLUSION:  1. Borderline heart size.  Tortuous aorta. 2. No acute pulmonary disease.     Dictated by ([facility]): Theodore Lora MD on 6/17/2025 at 10:58 AM     Finalized by ([facility]): Theodore Lora MD on 6/17/2025 at 10:59 AM                            MDM           Medical Decision Making  Vitals stable in the ED.  Labs reviewed as above.  She is feeling improved after GI cocktail and Protonix.  Per my independent interpretation of CT abdomen pelvis, no surgical pathology.  Other radiology findings noted and discussed with patient and her family member as above.  Discussed bland diet, PPI, stool softener, and PCP follow-up.  Return precautions provided and discussed and  she is comfortable with the plan.      Problems Addressed:  Abdominal pain of unknown etiology: complicated acute illness or injury with systemic symptoms    Amount and/or Complexity of Data Reviewed  Labs: ordered. Decision-making details documented in ED Course.  Radiology: ordered and independent interpretation performed. Decision-making details documented in ED Course.    Risk  Prescription drug management.  Decision regarding hospitalization.  Risk Details: No clear indication for hospitalization at this time        Disposition and Plan     Clinical Impression:  1. Abdominal pain of unknown etiology         Disposition:  Discharge  6/17/2025  1:46 pm    Follow-up:  Nila Mason MD  [address] [place] [id number]  [phone]    Follow up in 1 week(s)      We recommend that you schedule follow up care with a primary care provider within the next three months to obtain basic health screening including reassessment of your blood pressure.      Medications Prescribed:  Discharge Medication List as of 6/17/2025  2:07 PM        START taking these medications    Details   polyethylene glycol, PEG 3350, 17 g Oral Powd Pack Take 17 g by mouth daily as needed., Normal, Disp-12 each, R-0      pantoprazole 40 MG Oral Tab EC Take 1 tablet (40 mg total) by mouth daily., Normal, Disp-30 tablet, R-0                   Supplementary Documentation:

## 2025-07-04 ENCOUNTER — HOSPITAL ENCOUNTER (EMERGENCY)
Facility: HOSPITAL | Age: 80
Discharge: HOME | End: 2025-07-04
Attending: EMERGENCY MEDICINE
Payer: MEDICARE

## 2025-07-04 ENCOUNTER — APPOINTMENT (OUTPATIENT)
Dept: CT IMAGING | Facility: HOSPITAL | Age: 80
End: 2025-07-04
Attending: EMERGENCY MEDICINE
Payer: MEDICARE

## 2025-07-04 ENCOUNTER — APPOINTMENT (OUTPATIENT)
Dept: GENERAL RADIOLOGY | Facility: HOSPITAL | Age: 80
End: 2025-07-04
Attending: EMERGENCY MEDICINE
Payer: MEDICARE

## 2025-07-04 VITALS
RESPIRATION RATE: 15 BRPM | OXYGEN SATURATION: 99 % | HEART RATE: 63 BPM | SYSTOLIC BLOOD PRESSURE: 120 MMHG | DIASTOLIC BLOOD PRESSURE: 70 MMHG | TEMPERATURE: 98 F

## 2025-07-04 DIAGNOSIS — N30.01 ACUTE CYSTITIS WITH HEMATURIA: ICD-10-CM
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
## 2025-07-04 LAB
ALBUMIN SERPL-MCNC: 5 G/DL (ref 3.2–4.8)
ALBUMIN/GLOB SERPL: 1.8 {RATIO} (ref 1–2)
ALP LIVER SERPL-CCNC: 57 U/L (ref 55–142)
ALT SERPL-CCNC: 12 U/L (ref 10–49)
ANION GAP SERPL CALC-SCNC: 8 MMOL/L (ref 0–18)
AST SERPL-CCNC: 22 U/L (ref ?–34)
BASOPHILS # BLD AUTO: 0.04 X10(3) UL (ref 0–0.2)
BASOPHILS NFR BLD AUTO: 0.6 %
BILIRUB SERPL-MCNC: 1.2 MG/DL (ref 0.2–1.1)
BILIRUB UR QL: NEGATIVE
BUN BLD-MCNC: 15 MG/DL (ref 9–23)
BUN/CREAT SERPL: 17.2 (ref 10–20)
CALCIUM BLD-MCNC: 9.6 MG/DL (ref 8.7–10.4)
CHLORIDE SERPL-SCNC: 100 MMOL/L (ref 98–112)
CLARITY UR: CLEAR
CO2 SERPL-SCNC: 30 MMOL/L (ref 21–32)
CREAT BLD-MCNC: 0.87 MG/DL (ref 0.55–1.02)
DEPRECATED RDW RBC AUTO: 44.7 FL (ref 35.1–46.3)
EGFRCR SERPLBLD CKD-EPI 2021: 67 ML/MIN/1.73M2 (ref 60–?)
EOSINOPHIL # BLD AUTO: 0.07 X10(3) UL (ref 0–0.7)
EOSINOPHIL NFR BLD AUTO: 1 %
ERYTHROCYTE [DISTWIDTH] IN BLOOD BY AUTOMATED COUNT: 13.2 % (ref 11–15)
GLOBULIN PLAS-MCNC: 2.8 G/DL (ref 2–3.5)
GLUCOSE BLD-MCNC: 91 MG/DL (ref 70–99)
GLUCOSE UR-MCNC: NORMAL MG/DL
HCT VFR BLD AUTO: 34.5 % (ref 35–48)
HGB BLD-MCNC: 11.5 G/DL (ref 12–16)
IMM GRANULOCYTES # BLD AUTO: 0.02 X10(3) UL (ref 0–1)
IMM GRANULOCYTES NFR BLD: 0.3 %
KETONES UR-MCNC: NEGATIVE MG/DL
LEUKOCYTE ESTERASE UR QL STRIP.AUTO: 250
LYMPHOCYTES # BLD AUTO: 2.06 X10(3) UL (ref 1–4)
LYMPHOCYTES NFR BLD AUTO: 29.4 %
MCH RBC QN AUTO: 30.9 PG (ref 26–34)
MCHC RBC AUTO-ENTMCNC: 33.3 G/DL (ref 31–37)
MCV RBC AUTO: 92.7 FL (ref 80–100)
MONOCYTES # BLD AUTO: 0.56 X10(3) UL (ref 0.1–1)
MONOCYTES NFR BLD AUTO: 8 %
NEUTROPHILS # BLD AUTO: 4.25 X10 (3) UL (ref 1.5–7.7)
NEUTROPHILS # BLD AUTO: 4.25 X10(3) UL (ref 1.5–7.7)
NEUTROPHILS NFR BLD AUTO: 60.7 %
NITRITE UR QL STRIP.AUTO: NEGATIVE
OSMOLALITY SERPL CALC.SUM OF ELEC: 286 MOSM/KG (ref 275–295)
PH UR: 6.5 [PH] (ref 5–8)
PLATELET # BLD AUTO: 280 10(3)UL (ref 150–450)
POTASSIUM SERPL-SCNC: 3.3 MMOL/L (ref 3.5–5.1)
PROT SERPL-MCNC: 7.8 G/DL (ref 5.7–8.2)
PROT UR-MCNC: NEGATIVE MG/DL
RBC # BLD AUTO: 3.72 X10(6)UL (ref 3.8–5.3)
SODIUM SERPL-SCNC: 138 MMOL/L (ref 136–145)
SP GR UR STRIP: 1.01 (ref 1–1.03)
TROPONIN I SERPL HS-MCNC: 3 NG/L (ref ?–34)
TSI SER-ACNC: 0.67 UIU/ML (ref 0.55–4.78)
UROBILINOGEN UR STRIP-ACNC: NORMAL
WBC # BLD AUTO: 7 X10(3) UL (ref 4–11)

## 2025-07-04 PROCEDURE — 96360 HYDRATION IV INFUSION INIT: CPT

## 2025-07-04 PROCEDURE — 81001 URINALYSIS AUTO W/SCOPE: CPT | Performed by: EMERGENCY MEDICINE

## 2025-07-04 PROCEDURE — 80053 COMPREHEN METABOLIC PANEL: CPT | Performed by: EMERGENCY MEDICINE

## 2025-07-04 PROCEDURE — 85025 COMPLETE CBC W/AUTO DIFF WBC: CPT

## 2025-07-04 PROCEDURE — 85025 COMPLETE CBC W/AUTO DIFF WBC: CPT | Performed by: EMERGENCY MEDICINE

## 2025-07-04 PROCEDURE — 80053 COMPREHEN METABOLIC PANEL: CPT

## 2025-07-04 PROCEDURE — 87086 URINE CULTURE/COLONY COUNT: CPT | Performed by: EMERGENCY MEDICINE

## 2025-07-04 PROCEDURE — 73030 X-RAY EXAM OF SHOULDER: CPT | Performed by: RADIOLOGY

## 2025-07-04 PROCEDURE — 84484 ASSAY OF TROPONIN QUANT: CPT | Performed by: EMERGENCY MEDICINE

## 2025-07-04 PROCEDURE — 93010 ELECTROCARDIOGRAM REPORT: CPT

## 2025-07-04 PROCEDURE — 99284 EMERGENCY DEPT VISIT MOD MDM: CPT

## 2025-07-04 PROCEDURE — 93005 ELECTROCARDIOGRAM TRACING: CPT

## 2025-07-04 PROCEDURE — 99285 EMERGENCY DEPT VISIT HI MDM: CPT

## 2025-07-04 PROCEDURE — 71046 X-RAY EXAM CHEST 2 VIEWS: CPT | Performed by: RADIOLOGY

## 2025-07-04 PROCEDURE — 84443 ASSAY THYROID STIM HORMONE: CPT | Performed by: EMERGENCY MEDICINE

## 2025-07-04 PROCEDURE — 70450 CT HEAD/BRAIN W/O DYE: CPT | Performed by: RADIOLOGY

## 2025-07-04 RX ORDER — POTASSIUM CHLORIDE 1500 MG/1
40 TABLET, EXTENDED RELEASE ORAL ONCE
Status: COMPLETED | OUTPATIENT
Start: 2025-07-04 | End: 2025-07-04

## 2025-07-04 RX ORDER — NITROFURANTOIN 25; 75 MG/1; MG/1
100 CAPSULE ORAL 2 TIMES DAILY
Qty: 10 CAPSULE | Refills: 0 | Status: SHIPPED | OUTPATIENT
Start: 2025-07-04 | End: 2025-07-09

## 2025-07-04 RX ORDER — ACETAMINOPHEN 500 MG
1000 TABLET ORAL ONCE
Status: COMPLETED | OUTPATIENT
Start: 2025-07-04 | End: 2025-07-04

## 2025-07-04 NOTE — ED INITIAL ASSESSMENT (HPI)
Patient fell yesterday in kitchen. States she \"blanked out\" doesn't remember how she fell, states took her a minute to wake up. Endorses hitting right side of head and right shoulder. Patient has some swelling above right eye. Complains of right shoulder pain. Denies any neck pain. Patient denies any blood thinner use. -befast.

## 2025-07-04 NOTE — ED PROVIDER NOTES
Patient Seen in: [facility] Emergency Department        History  Chief Complaint   Patient presents with    Trauma     Stated Complaint: fall    Subjective:   HPI            79-year-old female with history of hypothyroidism, hyperlipidemia, hypertension, also on diltiazem and hydrochlorothiazide who presents for evaluation of syncopal episode, accompanied by friend in the room, states that she was talking the patient when she appeared to be going unconscious and then fell and hit her head on the right side, no seizure-like activity was noted, patient was responsive within 1 minutes, slow to get up.  Complains of right sided temporal forehead pain, right shoulder pain but otherwise no neck pain, no other symptoms.      Objective:     Past Medical History:    Cataract    Cervical vertebral fusion    Disorder of thyroid    Essential hypertension    High blood pressure    Hyperlipidemia    Hyperthyroidism    Incontinence    Positive FIT (fecal immunochemical test)    05/10/22 EGD w/cold biopsy & Colonoscopy:  Normal EGD, internal hemorrhoids, tortuous colon    Screen for colon cancer    no polyps noted on c-scope    Visual impairment    Readers              Past Surgical History:   Procedure Laterality Date    Back surgery      Carpal tunnel release Left     Cataract Left 3/16/17    Cataract Right 06/2017    Colonoscopy N/A 5/10/2022    Procedure: COLONOSCOPY;  Surgeon: CRISS Fonseca MD;  Location: [facility] ENDOSCOPY    Knee surgery Left 1982    Other surgical history      Bladder nodules removed    Other surgical history      Thyroid nodule removed                Social History     Socioeconomic History    Marital status:    Tobacco Use    Smoking status: Former     Current packs/day: 0.50     Types: Cigarettes    Smokeless tobacco: Never   Vaping Use    Vaping status: Never Used   Substance and Sexual Activity    Alcohol use: Yes     Alcohol/week: 1.0 standard drink of alcohol     Types: 1 Glasses of  wine per week     Comment: ocassionally    Drug use: Not Currently     Types: Cannabis     Comment: gummies    Sexual activity: Not Currently   Other Topics Concern    Reaction to local anesthetic No                                Physical Exam    ED Triage Vitals [07/04/25 0749]   /69   Pulse 74   Resp 16   Temp 97.8 °F (36.6 °C)   Temp src Temporal   SpO2 98 %   O2 Device None (Room air)       Current Vitals:   Vital Signs  BP: 120/70  Pulse: 63  Resp: 15  Temp: 97.8 °F (36.6 °C)  Temp src: Temporal  MAP (mmHg): 86    Oxygen Therapy  SpO2: 99 %  O2 Device: None (Room air)            Physical Exam  Vitals reviewed.   Constitutional:       Appearance: Normal appearance.   HENT:      Head:      Comments: Small amount of swelling noted to lateral aspect of right eye, extraocular motions are intact, there is no evidence of proptosis, Injection or other.     Mouth/Throat:      Mouth: Mucous membranes are dry.      Pharynx: Oropharynx is clear.   Cardiovascular:      Rate and Rhythm: Normal rate and regular rhythm.      Heart sounds: No murmur heard.  Pulmonary:      Effort: Pulmonary effort is normal.   Abdominal:      Palpations: Abdomen is soft.   Neurological:      General: No focal deficit present.      Mental Status: She is alert and oriented to person, place, and time.   Psychiatric:         Mood and Affect: Mood normal.         Behavior: Behavior normal.                 ED Course  Labs Reviewed   COMP METABOLIC PANEL (14) - Abnormal; Notable for the following components:       Result Value    Potassium 3.3 (*)     Bilirubin, Total 1.2 (*)     Albumin 5.0 (*)     All other components within normal limits   CBC WITH DIFFERENTIAL WITH PLATELET - Abnormal; Notable for the following components:    RBC 3.72 (*)     HGB 11.5 (*)     HCT 34.5 (*)     All other components within normal limits   URINALYSIS WITH CULTURE REFLEX - Abnormal; Notable for the following components:    Blood Urine 1+ (*)     Leukocyte  Esterase Urine 250 (*)     WBC Urine 6-10 (*)     RBC Urine 6-10 (*)     Squamous Epi. Cells Few (*)     All other components within normal limits   TROPONIN I HIGH SENSITIVITY - Normal   TSH W REFLEX TO FREE T4 - Normal   URINE CULTURE, ROUTINE     EKG    Rate, intervals and axes as noted on EKG Report.  Rate: 73  Rhythm: Sinus Rhythm  Reading: Sinus rhythm, rate of 73, normal axis, normal intervals, no significant ST elevation or depressions, QRS complexes 68, QTc 429.           ED Course as of 07/04/25 1146  ------------------------------------------------------------  Time: 07/04 0957  Comment: Impression  CONCLUSION:  1.  No radiographically visible acute osseous injury of the right shoulder.    2.  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    Electronically Verified and Signed by Attending Radiologist: Gagandeep Fonseca MD 7/4/2025 9:52 AM  Workstation: [id number]                         [facility] [place]         79-year-old female history as above presents for evaluation of syncopal episode.  Feels well, minor trauma to right side of temple, and right shoulder.    Syncope workup and evaluation with CT scan of head, shoulder x-ray chest PA lateral.    CT head unremarkable for acute abnormality, she has no neck tenderness or neck pain.  Right shoulder x-ray without acute injury.  Chest x-ray unremarkable.    Labs are reassuring, there is no evidence of elevated troponin, on the monitor there is no evidence of arrhythmia, EKG as above, reassuring.  Ultimately discussed that the patient has a low [place] syncope score of 0 at this time, -1 for mild predisposition of symptoms and then +1 for history of CAD but otherwise unremarkable.    Offered observation admission given patient's age, however prefers to monitor symptoms at home and follow-up with her regular doctor which I am agreeable with.  She understand strict return precautions, given fall prevention education.    Her urine had slight  abnormalities, discussed options of empiric treatment versus awaiting urine culture preferred for empiric treatment, due to age asymptomatic but possibly a component to patient's episode yesterday, given 5-day course of nitrofurantoin.        Medical Decision Making  Amount and/or Complexity of Data Reviewed  Independent Historian: friend     Details: Who provided collateral information regarding the fall as she was there and witnessed it.  Labs: ordered. Decision-making details documented in ED Course.  Radiology: ordered. Decision-making details documented in ED Course.  ECG/medicine tests: ordered and independent interpretation performed. Decision-making details documented in ED Course.    Risk  OTC drugs.  Prescription drug management.  Decision regarding hospitalization.        Disposition and Plan     Clinical Impression:  1. Fall, initial encounter    2. Acute cystitis with hematuria         Disposition:  Discharge  7/4/2025 11:20 am    Follow-up:  Nila Mason MD  [address] [place] [id number]  [phone]    Follow up in 3 day(s)      We recommend that you schedule follow up care with a primary care provider within the next three months to obtain basic health screening including reassessment of your blood pressure.      Medications Prescribed:  Discharge Medication List as of 7/4/2025 11:29 AM        START taking these medications    Details   nitrofurantoin monohydrate macro 100 MG Oral Cap Take 1 capsule (100 mg total) by mouth 2 (two) times daily for 5 days., Normal, Disp-10 capsule, R-0                   Supplementary Documentation:

## 2025-07-07 LAB
ATRIAL RATE: 73 BPM
P AXIS: 65 DEGREES
P-R INTERVAL: 158 MS
Q-T INTERVAL: 390 MS
QRS DURATION: 68 MS
QTC CALCULATION (BEZET): 429 MS
R AXIS: 26 DEGREES
T AXIS: 43 DEGREES
VENTRICULAR RATE: 73 BPM

## (undated) DIAGNOSIS — E03.9 ACQUIRED HYPOTHYROIDISM: ICD-10-CM

## (undated) DEVICE — Device: Brand: STABLECUT®

## (undated) DEVICE — GAMMEX® NON-LATEX PI ORTHO SIZE 7, STERILE POLYISOPRENE POWDER-FREE SURGICAL GLOVE: Brand: GAMMEX

## (undated) DEVICE — DRAPE SHEET LARGE 76X55

## (undated) DEVICE — DRESSING 10X4IN ANMC SAFETAC

## (undated) DEVICE — CONMED SCOPE SAVER BITE BLOCK, 20X27 MM: Brand: SCOPE SAVER

## (undated) DEVICE — KIT CLEAN ENDOKIT 1.1OZ GOWNX2

## (undated) DEVICE — VIOLET BRAIDED (POLYGLACTIN 910), SYNTHETIC ABSORBABLE SUTURE: Brand: COATED VICRYL

## (undated) DEVICE — SYRINGE 35ML LL TIP

## (undated) DEVICE — LINE MNTR ADLT SET O2 INTMD

## (undated) DEVICE — SUT VICRYL 2-0 FS-1 J443H

## (undated) DEVICE — CEMENT MIXING SYSTEM WITH FEMORAL BREAKWAY NOZZLE: Brand: REVOLUTION

## (undated) DEVICE — BANDAGE COMP PREMPRO 5YDX4IN

## (undated) DEVICE — SPINOCAN® 18 GA. X 3-1/2 IN. (90 MM) SPINAL NEEDLE: Brand: SPINOCAN®

## (undated) DEVICE — PSNA CM FM/CM TIB/VE: Type: IMPLANTABLE DEVICE

## (undated) DEVICE — KIT ENDO ORCAPOD 160/180/190

## (undated) DEVICE — 25MM FEMALE SCREW-Z

## (undated) DEVICE — 2MM SET SCREW HEX DRIVER

## (undated) DEVICE — GOWN SURG AERO BLUE PERF XLG

## (undated) DEVICE — WRAP COOLING KNEE W/ICE PILLOW

## (undated) DEVICE — SOLUTION  .9 1000ML BTL

## (undated) DEVICE — 35 ML SYRINGE REGULAR TIP: Brand: MONOJECT

## (undated) DEVICE — CLOSURE EXOFIN 1.0ML

## (undated) DEVICE — HOOD: Brand: FLYTE

## (undated) DEVICE — APPLICATOR CHLORAPREP 26ML

## (undated) DEVICE — CLOSURE EXOFIN .5ML

## (undated) DEVICE — GAMMEX® NON-LATEX PI ORTHO SIZE 8, STERILE POLYISOPRENE POWDER-FREE SURGICAL GLOVE: Brand: GAMMEX

## (undated) DEVICE — FORCEP RADIAL JAW 4

## (undated) DEVICE — GAMMEX® PI HYBRID SIZE 7.5, STERILE POWDER-FREE SURGICAL GLOVE, POLYISOPRENE AND NEOPRENE BLEND: Brand: GAMMEX

## (undated) DEVICE — TRAY SKIN PREP PVP-1

## (undated) DEVICE — SHEET,DRAPE,70X100,STERILE: Brand: MEDLINE

## (undated) DEVICE — TOTAL KNEE: Brand: MEDLINE INDUSTRIES, INC.

## (undated) DEVICE — TROCAR-Z

## (undated) DEVICE — 48MM HEADED SCREW-Z

## (undated) DEVICE — GAUZE SPONGES,12 PLY: Brand: CURITY

## (undated) DEVICE — SOLUTION  .9 3000ML

## (undated) DEVICE — MEDI-VAC NON-CONDUCTIVE SUCTION TUBING 6MM X 1.8M (6FT.) L: Brand: CARDINAL HEALTH

## (undated) DEVICE — SUT MONOCRYL 3-0 PS-1 Y936H

## (undated) DEVICE — GAMMEX® PI HYBRID SIZE 6.5, STERILE POWDER-FREE SURGICAL GLOVE, POLYISOPRENE AND NEOPRENE BLEND: Brand: GAMMEX

## (undated) DEVICE — MASK PROC W/VISOR ANTIGLARE

## (undated) DEVICE — PSI PERSONA PS PIN GDE FEM-TIB

## (undated) NOTE — LETTER
8/20/2021              Penny [address] [place] [address]         Dear Kathryn Howe,      The report of the Biopsy done on 8/17/21 shows a Pigmented seborrheic keratosis.    This is a benign (not cancerous) growth, and requires no further

## (undated) NOTE — LETTER
[address] [place] [address]      Authorization for Surgical Operation and Procedure     Date:___________                                                                                                         Time:__________  1. I hereby Candy Hart MD, my physician and his/her assistants (if applicable), which may include medical students, residents, and/or fellows, to perform the following surgical operation/ procedure and administer such anesthesia as may be determined necessary by my physician:  Operation/Procedure name (s) COLONOSCOPY/ESOPHAGOGASTRODUODENOSCOPY (EGD)  on [place] [address]   2. I recognize that during the surgical operation/procedure, unforeseen conditions may necessitate additional or different procedures than those listed above. I, therefore, further authorize and request that the above-named surgeon, assistants, or designees perform such procedures as are, in their judgment, necessary and desirable. 3.   My surgeon/physician has discussed prior to my surgery the potential benefits, risks and side effects of this procedure; the likelihood of achieving goals; and potential problems that might occur during recuperation. They also discussed reasonable alternatives to the procedure, including risks, benefits, and side effects related to the alternatives and risks related to not receiving this procedure. I have had all my questions answered and I acknowledge that no guarantee has been made as to the result that may be obtained. 4.   Should the need arise during my operation or immediate post-operative period, I also consent to the administration of blood and/or blood products. Further, I understand that despite careful testing and screening of blood or blood products by collecting agencies, I may still be subject to ill effects as a result of receiving a blood transfusion and/or blood products.   The following are some, but not all, of the potential risks that can occur: fever and allergic reactions, hemolytic reactions, transmission of diseases such as Hepatitis, AIDS and Cytomegalovirus (CMV) and fluid overload. In the event that I wish to have an autologous transfusion of my own blood, or a directed donor transfusion. I will discuss this with my physician. 5.   I authorize the use of any specimen, organs, tissues, body parts or foreign objects that may be removed from my body during the operation/procedure for diagnosis, research or teaching purposes and their subsequent disposal by hospital authorities. I also authorize the release of specimen test results and/or written reports to my treating physician on the hospital medical staff or other referring or consulting physicians involved in my care, at the discretion of the Pathologist or my treating physician. 6.   I consent to the photographing or videotaping of the operations or procedures to be performed, including appropriate portions of my body for medical, scientific, or educational purposes, provided my identity is not revealed by the pictures or by descriptive texts accompanying them. If the procedure has been photographed/videotaped, the surgeon will obtain the original picture, image, videotape or CD. The hospital will not be responsible for storage, release or maintenance of the picture, image, tape or CD.    7.   I consent to the presence of a  or observers in the operating room as deemed necessary by my physician or their designees. 8.   I recognize that in the event my procedure results in extended X-Ray/fluoroscopy time, I may develop a skin reaction. 9. If I have a Do Not Attempt Resuscitation (DNAR) order in place, that status will be suspended while in the operating room, procedural suite, and during the recovery period unless otherwise explicitly stated by me (or a person authorized to consent on my behalf).  The surgeon or my attending physician will determine when the applicable recovery period ends for purposes of reinstating the DNAR order. 10. Patients having a sterilization procedure: I understand that if the procedure is successful the results will be permanent and it will therefore be impossible for me to inseminate, conceive, or bear children. I also understand that the procedure is intended to result in sterility, although the result has not been guaranteed. 11. I acknowledge that my physician has explained sedation/analgesia administration to me including the risk and benefits I consent to the administration of sedation/analgesia as may be necessary or desirable in the judgment of my physician. I CERTIFY THAT I HAVE READ AND FULLY UNDERSTAND THE ABOVE CONSENT TO OPERATION and/or OTHER PROCEDURE.    _________________________________________  __________________________________  Signature of Patient     Signature of Responsible Person         ___________________________________         Printed Name of Responsible Person           _________________________________                  Relationship to Patient  _________________________________________  ______________________________  Signature of Witness          Date  Time    STATEMENT OF PHYSICIAN My signature below affirms that prior to the time of the procedure; I have explained to the patient and/or his/her legal representative, the risks and benefits involved in the proposed treatment and any reasonable alternative to the proposed treatment. I have also explained the risks and benefits involved in refusal of the proposed treatment and alternatives to the proposed treatment and have answered the patient's questions. If I have a significant financial interest in a co-management agreement or a significant financial interest in any product or implant, or other significant relationship used in this procedure/surgery, I have disclosed this and had a discussion with my patient. _______________________________________________________________ _____________________________  Shiratika Frame of Physician)                                                                                         (Date)                                   (Time)        Patient Name: Indra Limaherbmika    : [id number]   Printed: 2022      Medical Record #: [id number]                                              Page 1 of

## (undated) NOTE — LETTER
2/26/2025    Penny Roblero  [address] [place] [address]    Dear Penny,    We would like to inform you that your account has been charged $40 for not showing up to the office for your scheduled appointment on 2/19/25.    Our no-show policy is as follows: A 24-hour notice is required, or you may be charged a $40 No Show fee.      If you are unable to keep your scheduled appointment, please notify us at least 24 hours in advance so we can accommodate our other patients. You may also reschedule your appointment at that time.    On the third no-show, within a 12-month period, it will be the physician’s discretion as to whether a discharge letter will be sent out disengaging you from the practice and giving you 30 days to enroll with a new non [facility] physician.    If you would like to contest this charge, please call [phone].    Sincerely,  [facility]

## (undated) NOTE — LETTER
9/30/2021              Diego Magaña [address] [id number]         To Whom It May Concern,    Diego Archana Sjaan Chance is under my care. She should have the [employer] booster should due to her age and medical conditions.     Sincerely

## (undated) NOTE — LETTER
[address], [address], [address] [facility], [address] [place] [address], [address]  . [address]  [phone]        Dear Renetta Lynch MD,      I had the pleasure of seeing your patient, Don Rene on 10/2/2018.      Below pleas MRI, MR angiogram of the head. I have asked her to call the office after completing MRI, MRA, after 2-week trial of Topamax and after completing physical therapy. I greatly appreciate the opportunity of participating in her neurological care.

## (undated) NOTE — LETTER
[address], [address] [facility]       09/15/17        Patient: Nahum Mehta   YOB: 1945   Date of Visit: 9/15/2017       Dear  Dr. Judy Hawthorne MD,      Thank you for referring Nahum Mehta to my practice.   Ple

## (undated) NOTE — LETTER
[facility]  Administration of Anesthesia  1. Mia ZHOU 4, or _________________________________ acting on her behalf, (Patient) (Dependent/Representative) request to receive anesthesia for my pending procedure/operation/treatment. A physician (anesthesiologist) alone or an anesthesiologist working with a nurse anesthetist may administer my anesthesia. 2. I understand that my anesthesiologist is not an employee or agent of the hospital, but is an independent medical practitioner who has been permitted to use its facilities for the care and treatment of his/her patients. 3. I acknowledge that a physician from [facility] or their designate(s), recommended anesthesia for me using her/his medical judgment. The type(s) of anesthesia I may receive include:                a) General Anesthesia, b) Spinal/Epidural Anesthesia, c) Regional Anesthesia or d) Monitored Anesthesia Care. 4. If my spinal, regional or monitored anesthesia care (local) is not satisfactory for my comfort, or if my medical condition requires, I consent to the administration of general anesthesia. 5. I am aware that the practice of anesthesiology is not an exact science and that some foreseeable risks or consequences may occur. Some common risks/consequences include sore throat and hoarseness, nausea and vomiting, muscle soreness, backache, damage to the mouth/teeth/vocal cords and eye injury. I understand that more rare but serious potential risks of anesthesia include blood pressure changes, drug reactions, cardiac arrest, brain damage, paralysis or death. These risks apply to whether I have general, spinal/epidural, regional or monitored anesthesia care. 6. OBSTETRIC PATIENTS: Specific risks/consequences of spinal/epidural anesthesia may include itching, low blood pressure, difficulty urinating, slowing of the baby's heart rate and headache.  Rare risks include infections, high spinal block, spinal bleeding, seizure, cardiac arrest and death. 7. AWARENESS: I understand that it is possible (but unlikely) to have explicit memory of events from the operating room while under general anesthesia. 8. ELECTROCONVULSIVE THERAPY PATIENTS: This consent serves for all treatments in a single course of therapy. 9. I understand that I must inform my anesthesiologist when I last ate and/or drank to minimize the risk of anesthesia. 10. If I am pregnant, or may pregnant, I understand that elective surgery should be postponed until after the baby is born. Anesthetics cross the placenta and may temporarily anesthetize the baby. Although fetal complications of anesthesia during pregnancy are rare, they may include birth defects, premature labor, brain damage and death. 11. I certify that I informed the anesthesiologist, to the best of my ability, about medication I take including blood thinners, anticoagulants, herbal remedies, narcotics and recreational drugs (e.g. cocaine, marijuana, PCP). Failure to inform my anesthesiologist about these medicines may increase my risk of anesthetic complications. The nature and purpose of my anesthetic management was explained to me. I had the opportunity to ask questions and the answers and information provided meet my satisfaction.   I retain the right to withdraw this consent at any time prior to the administration of said anesthetic.    ___________________________________________________           _____________________________________________________  Patient Signature                                                                                      Witness Signature                ___________________________________________________           _____________________________________________________  Date/Time                                                                                               Responsible person in case of minor/ unconscious pt /Relationship    My signature below affirms that prior to the time of the procedure, I have explained to the patient and/or his/her guardian, the risks and benefits of undergoing anesthesia, as well as any reasonable alternatives.     ___________________________________________________            _____________________________________________________  Physician Signature                            Date/Time  Patient Name: Remy Driscoll     : 11/15/1945     Printed: 2022      Medical Record #: [id number]                              Page 1 of 1    ----------ANESTHESIA CONSENT----------

## (undated) NOTE — MR AVS SNAPSHOT
[address] [address]  [phone]               Thank you for choosing us for your health care visit with Christina Luque MD.  We are glad to serve you and happy to provide you with this summary Pantoprazole Sodium 40 MG Tbec   Take 1 tablet (40 mg total) by mouth every morning before breakfast.   Commonly known as:  PROTONIX           simvastatin 40 MG Tabs   Take 1 tablet (40 mg total) by mouth nightly.    Commonly known as:  [employer] Support [employer]. Remember, MyChart is NOT to be used for urgent needs. For medical emergencies, dial 911.            Visit [facility] online at  [email or web address]

## (undated) NOTE — LETTER
AUTHORIZATION FOR SURGICAL OPERATION OR OTHER PROCEDURE    1. I hereby authorize Dr. Marilee Barlow  and [place] [employer] [place], [facility] staff assigned to my case to perform the following operation and/or procedure at the [facility], [facility]:    Cortisone injection in Left knee  _______________________________________________________________________________________________      _______________________________________________________________________________________________    2. My physician has explained the nature and purpose of the operation or other procedure, possible alternative methods of treatment, the risks involved, and the possibility of complication to me. I acknowledge that no guarantee has been made as to the result that may be obtained. 3.  I recognize that, during the course of this operation, or other procedure, unforseen conditions may necessitate additional or different procedure than those listed above. I, therefore, further authorize and request that the above named physician, his/her physician assistants or designees perform such procedures as are, in his/her professional opinion, necessary and desirable. 4.  Any tissue or organs removed in the operation or other procedure may be disposed of by and at the discretion of the [facility], [facility] and [facility]. 5.  I understand that in the event of a medical emergency, I will be transported by local paramedics to [facility] or other hospital emergency department. 6.  I certify that I have read and fully understand the above consent to operation and/or other procedure. 7.  I acknowledge that my physician has explained sedation/analgesia administration to me including the risks and benefits. I consent to the administration of sedation/analgesia as may be necessary or desirable in the judgement of my physician.     Witness signature: ___________________________________________________ Date:  ______/______/_____ Time:  ________ A. M.  P.M. Patient Name:  ______________________________________________________  (please print)      Patient signature:  ___________________________________________________             Relationship to Patient:           []  Parent    Responsible person                          []  Spouse  In case of minor or                    [] Other  _____________   Incompetent name:  __________________________________________________                               (please print)      _____________      Responsible person  In case of minor or  Incompetent signature:  _______________________________________________    Statement of Physician  My signature below affirms that prior to the time of the procedure, I have explained to the patient and/or his/her guardian, the risks and benefits involved in the proposed treatment and any reasonable alternative to the proposed treatment. I have also explained the risks and benefits involved in the refusal of the proposed treatment and have answered the patient's questions.                         Date:  ______/______/_______  Provider                      Signature:  __________________________________________________________       Time:  ___________ A.M    P.M.

## (undated) NOTE — MR AVS SNAPSHOT
[address] [address]  [phone]               Thank you for choosing us for your health care visit with Cecilia Lopez MD.  We are glad to serve you and happy to provide you with this summary Take 1 tablet (12.5 mg total) by mouth 3 (three) times daily as needed for Dizziness.  CAUSES DROWSINESS   Commonly known as:  ANTIVERT           Pantoprazole Sodium 40 MG Tbec   Take 1 tablet (40 mg total) by mouth every morning before breakfast.   Commonl Visit [facility] online at  [email or web address]